# Patient Record
Sex: MALE | Race: WHITE | NOT HISPANIC OR LATINO | Employment: OTHER | URBAN - METROPOLITAN AREA
[De-identification: names, ages, dates, MRNs, and addresses within clinical notes are randomized per-mention and may not be internally consistent; named-entity substitution may affect disease eponyms.]

---

## 2018-05-02 RX ORDER — POLYETHYLENE GLYCOL 3350 17 G/17G
POWDER, FOR SOLUTION ORAL
COMMUNITY
Start: 2014-01-24 | End: 2023-09-10 | Stop reason: ENTERED-IN-ERROR

## 2018-05-02 RX ORDER — GABAPENTIN 300 MG/1
300 CAPSULE ORAL
COMMUNITY
Start: 2015-10-02 | End: 2019-03-12 | Stop reason: SDUPTHER

## 2018-05-02 RX ORDER — POTASSIUM CHLORIDE 20 MEQ/1
20 TABLET, EXTENDED RELEASE ORAL
COMMUNITY
Start: 2017-06-26 | End: 2018-06-18 | Stop reason: SDUPTHER

## 2018-05-02 RX ORDER — LANCETS
EACH MISCELLANEOUS
COMMUNITY
Start: 2013-10-24

## 2018-05-02 RX ORDER — MINERAL OIL
180 ENEMA (ML) RECTAL
COMMUNITY
Start: 2015-01-15 | End: 2019-03-12 | Stop reason: ALTCHOICE

## 2018-05-02 RX ORDER — CLOBETASOL PROPIONATE 0.5 MG/G
0.05 CREAM TOPICAL
COMMUNITY
Start: 2014-06-16 | End: 2019-03-19 | Stop reason: ALTCHOICE

## 2018-05-02 RX ORDER — OXYCODONE HYDROCHLORIDE 5 MG/1
5 CAPSULE ORAL 3 TIMES DAILY
COMMUNITY
Start: 2017-08-01 | End: 2023-09-10 | Stop reason: ENTERED-IN-ERROR

## 2018-05-02 RX ORDER — LIRAGLUTIDE 6 MG/ML
INJECTION SUBCUTANEOUS
COMMUNITY
Start: 2014-10-31 | End: 2023-02-15

## 2018-05-02 RX ORDER — PANTOPRAZOLE SODIUM 40 MG/1
40 TABLET, DELAYED RELEASE ORAL
COMMUNITY
Start: 2017-06-14 | End: 2018-05-30 | Stop reason: SDUPTHER

## 2018-05-02 RX ORDER — CELECOXIB 200 MG/1
200 CAPSULE ORAL
COMMUNITY
Start: 2014-02-19 | End: 2019-03-19 | Stop reason: ALTCHOICE

## 2018-05-02 RX ORDER — DOXYCYCLINE 100 MG/1
100 CAPSULE ORAL
COMMUNITY
End: 2018-08-20 | Stop reason: ALTCHOICE

## 2018-05-02 RX ORDER — GLIMEPIRIDE 4 MG/1
4 TABLET ORAL
COMMUNITY
Start: 2013-12-31 | End: 2018-06-18 | Stop reason: SDUPTHER

## 2018-05-02 RX ORDER — DICLOFENAC SODIUM 10 MG/G
1 GEL TOPICAL
COMMUNITY
Start: 2014-02-19 | End: 2023-09-10 | Stop reason: ENTERED-IN-ERROR

## 2018-05-02 RX ORDER — LISINOPRIL 2.5 MG/1
2.5 TABLET ORAL
COMMUNITY
Start: 2017-08-01 | End: 2018-05-02 | Stop reason: SDUPTHER

## 2018-05-02 RX ORDER — HYDROMORPHONE HYDROCHLORIDE 12 MG/1
12 TABLET, EXTENDED RELEASE ORAL
COMMUNITY
Start: 2014-02-19 | End: 2022-01-31 | Stop reason: ALTCHOICE

## 2018-05-02 RX ORDER — METFORMIN HYDROCHLORIDE 850 MG/1
850 TABLET ORAL
COMMUNITY
Start: 2016-11-28 | End: 2019-07-09 | Stop reason: SDUPTHER

## 2018-05-02 RX ORDER — LISINOPRIL 2.5 MG/1
2.5 TABLET ORAL DAILY
Qty: 90 TABLET | Refills: 3 | Status: SHIPPED | OUTPATIENT
Start: 2018-05-02 | End: 2018-06-18 | Stop reason: SDUPTHER

## 2018-05-02 RX ORDER — ATORVASTATIN CALCIUM 20 MG/1
20 TABLET, FILM COATED ORAL
COMMUNITY
Start: 2017-06-02 | End: 2018-05-22 | Stop reason: SDUPTHER

## 2018-05-02 RX ORDER — VIT C/E/ZN/COPPR/LUTEIN/ZEAXAN 250MG-90MG
CAPSULE ORAL
COMMUNITY
Start: 2016-05-10 | End: 2023-09-10 | Stop reason: ENTERED-IN-ERROR

## 2018-05-02 RX ORDER — BUMETANIDE 1 MG/1
1 TABLET ORAL
COMMUNITY
Start: 2015-01-15 | End: 2018-08-20 | Stop reason: SDUPTHER

## 2018-05-21 ENCOUNTER — TELEPHONE (OUTPATIENT)
Dept: FAMILY MEDICINE | Facility: CLINIC | Age: 68
End: 2018-05-21

## 2018-05-21 DIAGNOSIS — M25.561 ACUTE PAIN OF RIGHT KNEE: Primary | ICD-10-CM

## 2018-05-22 RX ORDER — ATORVASTATIN CALCIUM 20 MG/1
20 TABLET, FILM COATED ORAL DAILY
Qty: 90 TABLET | Refills: 2 | Status: SHIPPED | OUTPATIENT
Start: 2018-05-22 | End: 2018-08-20 | Stop reason: SDUPTHER

## 2018-05-23 ENCOUNTER — HOSPITAL ENCOUNTER (OUTPATIENT)
Dept: RADIOLOGY | Age: 68
Discharge: HOME | End: 2018-05-23
Attending: FAMILY MEDICINE
Payer: MEDICARE

## 2018-05-23 DIAGNOSIS — M25.561 ACUTE PAIN OF RIGHT KNEE: ICD-10-CM

## 2018-05-23 PROCEDURE — 73564 X-RAY EXAM KNEE 4 OR MORE: CPT | Mod: RT

## 2018-05-23 NOTE — PROGRESS NOTES
Spoke with patient's wife and x-ray shows moderate/severe arthritis.  Synvisc would be a good option.  Refer to Dr. Zimmerman.

## 2018-05-30 RX ORDER — PANTOPRAZOLE SODIUM 40 MG/1
40 TABLET, DELAYED RELEASE ORAL DAILY
Qty: 90 TABLET | Refills: 3 | Status: SHIPPED | OUTPATIENT
Start: 2018-05-30 | End: 2018-06-18 | Stop reason: SDUPTHER

## 2018-06-18 RX ORDER — GLIMEPIRIDE 4 MG/1
TABLET ORAL
Qty: 135 TABLET | Refills: 3 | Status: SHIPPED | OUTPATIENT
Start: 2018-06-18 | End: 2019-07-09 | Stop reason: SDUPTHER

## 2018-06-18 RX ORDER — LISINOPRIL 2.5 MG/1
2.5 TABLET ORAL DAILY
Qty: 90 TABLET | Refills: 3 | Status: SHIPPED | OUTPATIENT
Start: 2018-06-18 | End: 2018-08-20 | Stop reason: SDUPTHER

## 2018-06-18 RX ORDER — POTASSIUM CHLORIDE 20 MEQ/1
20 TABLET, EXTENDED RELEASE ORAL 2 TIMES DAILY
Qty: 180 TABLET | Refills: 1 | Status: SHIPPED | OUTPATIENT
Start: 2018-06-18 | End: 2018-06-25 | Stop reason: SDUPTHER

## 2018-06-18 RX ORDER — PANTOPRAZOLE SODIUM 40 MG/1
40 TABLET, DELAYED RELEASE ORAL DAILY
Qty: 90 TABLET | Refills: 3 | Status: SHIPPED | OUTPATIENT
Start: 2018-06-18 | End: 2018-08-20 | Stop reason: SDUPTHER

## 2018-06-25 RX ORDER — POTASSIUM CHLORIDE 20 MEQ/1
20 TABLET, EXTENDED RELEASE ORAL 2 TIMES DAILY
Qty: 180 TABLET | Refills: 1 | Status: SHIPPED | OUTPATIENT
Start: 2018-06-25 | End: 2019-07-09 | Stop reason: SDUPTHER

## 2018-07-11 LAB
ALBUMIN SERPL-MCNC: 3.6 G/DL (ref 3.6–5.1)
ALBUMIN/GLOB SERPL: 1 (CALC) (ref 1–2.5)
ALP SERPL-CCNC: 85 U/L (ref 40–115)
ALT SERPL-CCNC: 18 U/L (ref 9–46)
AST SERPL-CCNC: 29 U/L (ref 10–35)
BILIRUB SERPL-MCNC: 0.8 MG/DL (ref 0.2–1.2)
BUN SERPL-MCNC: 10 MG/DL (ref 7–25)
BUN/CREAT SERPL: ABNORMAL (CALC) (ref 6–22)
CALCIUM SERPL-MCNC: 9 MG/DL (ref 8.6–10.3)
CHLORIDE SERPL-SCNC: 97 MMOL/L (ref 98–110)
CO2 SERPL-SCNC: 27 MMOL/L (ref 20–31)
CREAT SERPL-MCNC: 1 MG/DL (ref 0.7–1.18)
GFR SERPL CREATININE-BSD FRML MDRD: 76 ML/MIN/1.73M2
GLOBULIN SER CALC-MCNC: 3.7 G/DL (CALC) (ref 1.9–3.7)
GLUCOSE SERPL-MCNC: 125 MG/DL (ref 65–99)
HBA1C MFR BLD: 6.8 % OF TOTAL HGB
POTASSIUM SERPL-SCNC: 4.7 MMOL/L (ref 3.5–5.3)
PROT SERPL-MCNC: 7.3 G/DL (ref 6.1–8.1)
SODIUM SERPL-SCNC: 138 MMOL/L (ref 135–146)
TSH SERPL-ACNC: 2.49 MIU/L (ref 0.4–4.5)

## 2018-08-20 ENCOUNTER — OFFICE VISIT (OUTPATIENT)
Dept: FAMILY MEDICINE | Facility: CLINIC | Age: 68
End: 2018-08-20
Payer: MEDICARE

## 2018-08-20 VITALS
HEIGHT: 72 IN | WEIGHT: 315 LBS | RESPIRATION RATE: 15 BRPM | TEMPERATURE: 98.6 F | SYSTOLIC BLOOD PRESSURE: 120 MMHG | HEART RATE: 72 BPM | DIASTOLIC BLOOD PRESSURE: 60 MMHG | BODY MASS INDEX: 42.66 KG/M2

## 2018-08-20 DIAGNOSIS — R53.83 FATIGUE, UNSPECIFIED TYPE: ICD-10-CM

## 2018-08-20 DIAGNOSIS — G62.9 NEUROPATHY: ICD-10-CM

## 2018-08-20 DIAGNOSIS — L03.115 CELLULITIS OF RIGHT LOWER EXTREMITY: Primary | ICD-10-CM

## 2018-08-20 DIAGNOSIS — E11.65 TYPE 2 DIABETES MELLITUS WITH HYPERGLYCEMIA, WITHOUT LONG-TERM CURRENT USE OF INSULIN (CMS/HCC): ICD-10-CM

## 2018-08-20 PROBLEM — E11.319 DIABETIC RETINOPATHY (CMS/HCC): Status: ACTIVE | Noted: 2017-08-23

## 2018-08-20 PROBLEM — H26.9 CATARACTS, BILATERAL: Status: ACTIVE | Noted: 2017-08-23

## 2018-08-20 PROCEDURE — 99214 OFFICE O/P EST MOD 30 MIN: CPT | Performed by: FAMILY MEDICINE

## 2018-08-20 RX ORDER — BUMETANIDE 1 MG/1
1 TABLET ORAL 3 TIMES DAILY
Qty: 270 TABLET | Refills: 3 | Status: SHIPPED | OUTPATIENT
Start: 2018-08-20 | End: 2019-07-09 | Stop reason: SDUPTHER

## 2018-08-20 RX ORDER — METOPROLOL SUCCINATE 50 MG/1
25 TABLET, EXTENDED RELEASE ORAL NIGHTLY
COMMUNITY
End: 2019-03-19 | Stop reason: SINTOL

## 2018-08-20 RX ORDER — AMOXICILLIN AND CLAVULANATE POTASSIUM 875; 125 MG/1; MG/1
1 TABLET, FILM COATED ORAL 2 TIMES DAILY
Qty: 28 TABLET | Refills: 0 | Status: SHIPPED | OUTPATIENT
Start: 2018-08-20 | End: 2019-03-19 | Stop reason: ALTCHOICE

## 2018-08-20 RX ORDER — LISINOPRIL 2.5 MG/1
2.5 TABLET ORAL DAILY
Qty: 90 TABLET | Refills: 3 | Status: SHIPPED | OUTPATIENT
Start: 2018-08-20 | End: 2019-07-09 | Stop reason: SDUPTHER

## 2018-08-20 RX ORDER — PANTOPRAZOLE SODIUM 40 MG/1
40 TABLET, DELAYED RELEASE ORAL DAILY
Qty: 90 TABLET | Refills: 3 | Status: SHIPPED | OUTPATIENT
Start: 2018-08-20 | End: 2019-07-09 | Stop reason: SDUPTHER

## 2018-08-20 RX ORDER — ATORVASTATIN CALCIUM 20 MG/1
20 TABLET, FILM COATED ORAL DAILY
Qty: 90 TABLET | Refills: 3 | Status: SHIPPED | OUTPATIENT
Start: 2018-08-20 | End: 2019-07-09 | Stop reason: SDUPTHER

## 2018-08-20 NOTE — PROGRESS NOTES
Subjective      Patient ID: Mitchell Sauceda is a 68 y.o. male.    Patient presents for follow-up to his chronic medical conditions, diabetes and hypertension.  He has been having ongoing issues with his right lower extremity.  It is red, swollen and warm to touch.  He has been having some drainage from his right lower extremity.  He was recently on an antibiotic (Keflex) for pressure sores on his buttocks.  He is no fever or chills.  No chest pain or shortness of breath.  He does have fatigue.        The following have been reviewed and updated as appropriate in this visit:  Tobacco  Meds  Problems  Med Hx  Surg Hx  Fam Hx  Soc Hx      Review of Systems   Constitutional: Positive for fatigue. Negative for chills and fever.   Respiratory: Negative for cough and shortness of breath.    Cardiovascular: Positive for leg swelling.   Musculoskeletal: Positive for arthralgias.   Neurological: Positive for numbness. Negative for dizziness, light-headedness and headaches.   Hematological: Does not bruise/bleed easily.       Objective     Vitals:    08/20/18 1057 08/20/18 1112   BP: (!) 130/92 120/60   BP Location:  Left upper arm   Patient Position:  Sitting   Pulse: 72    Resp: 15    Temp: 37 °C (98.6 °F)    Weight: (!) 154 kg (339 lb)    Height: 1.829 m (6')      Body mass index is 45.98 kg/m².    Physical Exam   Constitutional: He appears well-developed and well-nourished.   Cardiovascular: Normal rate, regular rhythm and normal heart sounds.    No murmur heard.  Pulmonary/Chest: Effort normal and breath sounds normal. No respiratory distress. He has no wheezes.   Abdominal: Soft. Bowel sounds are normal. He exhibits no distension. There is no tenderness.   Skin: There is erythema.        Clear weeping from right lower leg  Warm to touch       Assessment/Plan   Problem List Items Addressed This Visit     Neuropathy (CMS/HCC)     Persistent  Fair control  Continue gabapentin         Type 2 diabetes mellitus with  hyperglycemia (CMS/HCC) (HCC)     Controlled  HgbA1c 6.8  Continue current medication           Relevant Medications    insulin lispro protamin/lispro (HUMALOG MIX 75-25,U-100,INSULN SUBQ)    Cellulitis of right lower extremity - Primary     Prescribed Augmentin 875mg 2x day for 14 days  Follow up with wound care         Fatigue     Likely from polypharmacy  Suggest he take the metoprolol at bedtime

## 2018-08-21 PROBLEM — L03.115 CELLULITIS OF RIGHT LOWER EXTREMITY: Status: ACTIVE | Noted: 2018-08-21

## 2018-08-21 ASSESSMENT — ENCOUNTER SYMPTOMS
FEVER: 0
CHILLS: 0
DIZZINESS: 0
HEADACHES: 0
SHORTNESS OF BREATH: 0
ARTHRALGIAS: 1
FATIGUE: 1
BRUISES/BLEEDS EASILY: 0
LIGHT-HEADEDNESS: 0
COUGH: 0

## 2018-08-22 PROBLEM — R53.83 FATIGUE: Status: ACTIVE | Noted: 2018-08-22

## 2018-08-22 PROBLEM — R53.82 CHRONIC FATIGUE: Status: ACTIVE | Noted: 2018-08-22

## 2018-08-22 ASSESSMENT — ENCOUNTER SYMPTOMS: NUMBNESS: 1

## 2018-09-06 ENCOUNTER — TELEPHONE (OUTPATIENT)
Dept: FAMILY MEDICINE | Facility: CLINIC | Age: 68
End: 2018-09-06

## 2018-09-06 NOTE — TELEPHONE ENCOUNTER
Ins faxed form to be faxed back, requesting Hemo a1c and OV notes signed by PT. This is for medicare. On PT's ledge for signatures

## 2018-09-24 ENCOUNTER — CLINICAL SUPPORT (OUTPATIENT)
Dept: FAMILY MEDICINE | Facility: CLINIC | Age: 68
End: 2018-09-24
Payer: MEDICARE

## 2018-09-24 DIAGNOSIS — Z23 NEED FOR INFLUENZA VACCINATION: Primary | ICD-10-CM

## 2018-09-24 PROCEDURE — G0008 ADMIN INFLUENZA VIRUS VAC: HCPCS | Performed by: FAMILY MEDICINE

## 2018-09-24 PROCEDURE — 90653 IIV ADJUVANT VACCINE IM: CPT | Performed by: FAMILY MEDICINE

## 2018-10-02 ENCOUNTER — DOCUMENTATION (OUTPATIENT)
Dept: FAMILY MEDICINE | Facility: CLINIC | Age: 68
End: 2018-10-02

## 2018-10-02 PROBLEM — E13.40: Status: ACTIVE | Noted: 2018-10-02

## 2018-10-02 NOTE — PROGRESS NOTES
Coding Clarification ([facility]) - [facility]  Note       DATE: 10/2/2018    RE: Mitchell DAILEY Sohail  : 1950      Under the direction of Aziza Randolph DO, the following adjustments were made to this patients Problem List:        New Code: E13.40           Code Description: Other specified diabetes mellitus with diabetic neuropathy, unspecified   Clarification Type EMR System Encounter Type Date of Service Provider   New Code [employer] Office Visit 2018 Aziza Randolph   Rationale: Code E13.40 Other specified diabetes mellitus with diabetic neuropathy, unspecified is documented in the office visit in the Assessment/Plan section of the encounter.

## 2018-10-12 LAB
AMB MLHC EXTERNAL DIABETIC EYE EXAMS: POSITIVE
AMB MLHC EXTERNAL DIABETIC EYE EXAMS: POSITIVE

## 2018-11-15 LAB
ALBUMIN SERPL-MCNC: 3.4 G/DL (ref 3.6–5.1)
ALBUMIN/CREAT UR: 19 MCG/MG CREAT
ALBUMIN/GLOB SERPL: 0.9 (CALC) (ref 1–2.5)
ALP SERPL-CCNC: 68 U/L (ref 40–115)
ALT SERPL-CCNC: 14 U/L (ref 9–46)
AST SERPL-CCNC: 25 U/L (ref 10–35)
BILIRUB SERPL-MCNC: 0.9 MG/DL (ref 0.2–1.2)
BUN SERPL-MCNC: 13 MG/DL (ref 7–25)
BUN/CREAT SERPL: ABNORMAL (CALC) (ref 6–22)
CALCIUM SERPL-MCNC: 8.9 MG/DL (ref 8.6–10.3)
CHLORIDE SERPL-SCNC: 98 MMOL/L (ref 98–110)
CO2 SERPL-SCNC: 32 MMOL/L (ref 20–32)
CREAT SERPL-MCNC: 0.99 MG/DL (ref 0.7–1.18)
CREAT UR-MCNC: 137 MG/DL (ref 20–320)
ERYTHROCYTE [DISTWIDTH] IN BLOOD BY AUTOMATED COUNT: 14.4 % (ref 11–15)
GFR SERPL CREATININE-BSD FRML MDRD: 77 ML/MIN/1.73M2
GLOBULIN SER CALC-MCNC: 4 G/DL (CALC) (ref 1.9–3.7)
GLUCOSE SERPL-MCNC: 61 MG/DL (ref 65–99)
HBA1C MFR BLD: 6.5 % OF TOTAL HGB
HCT VFR BLD AUTO: 36.4 % (ref 38.5–50)
HGB BLD-MCNC: 12 G/DL (ref 13.2–17.1)
MCH RBC QN AUTO: 28.6 PG (ref 27–33)
MCHC RBC AUTO-ENTMCNC: 33 G/DL (ref 32–36)
MCV RBC AUTO: 86.9 FL (ref 80–100)
MICROALBUMIN UR-MCNC: 2.6 MG/DL
PLATELET # BLD AUTO: 181 THOUSAND/UL (ref 140–400)
PMV BLD REES-ECKER: 10.8 FL (ref 7.5–12.5)
POTASSIUM SERPL-SCNC: 4.3 MMOL/L (ref 3.5–5.3)
PROT SERPL-MCNC: 7.4 G/DL (ref 6.1–8.1)
RBC # BLD AUTO: 4.19 MILLION/UL (ref 4.2–5.8)
SODIUM SERPL-SCNC: 138 MMOL/L (ref 135–146)
WBC # BLD AUTO: 4.3 THOUSAND/UL (ref 3.8–10.8)

## 2019-03-12 ENCOUNTER — PATIENT OUTREACH (OUTPATIENT)
Dept: CASE MANAGEMENT | Facility: CLINIC | Age: 69
End: 2019-03-12

## 2019-03-12 RX ORDER — ASCORBIC ACID 500 MG
1 TABLET ORAL DAILY
COMMUNITY

## 2019-03-12 RX ORDER — LANOLIN ALCOHOL/MO/W.PET/CERES
1000 CREAM (GRAM) TOPICAL DAILY
Status: ON HOLD | COMMUNITY
End: 2023-09-13 | Stop reason: SDUPTHER

## 2019-03-12 RX ORDER — GABAPENTIN 800 MG/1
300 TABLET ORAL
COMMUNITY
End: 2023-02-15

## 2019-03-12 RX ORDER — CETIRIZINE HYDROCHLORIDE 10 MG/1
10 TABLET ORAL DAILY
COMMUNITY
End: 2023-09-10 | Stop reason: ENTERED-IN-ERROR

## 2019-03-12 RX ORDER — FERROUS SULFATE 325(65) MG
1 TABLET, DELAYED RELEASE (ENTERIC COATED) ORAL EVERY OTHER DAY
COMMUNITY
End: 2023-09-10 | Stop reason: ENTERED-IN-ERROR

## 2019-03-12 NOTE — PROGRESS NOTES
NAME: Mitchell Sauceda    MRN: [id number]    YOB: 1950    EVENT DETAILS    Discharging Facility: [facility]  Date of Admission: 03/08/19  Date of Discharge: 03/10/19  Discharge Instructions Reviewed?: Yes         Reason for Admission: syncope/collapse       HPI: Spoke to pt's spouse. Pt presented to hospital after having a fall at home and hitting his head. Work up revealed slow afib in 30s-40s with pauses. CTH neg for bleed. Pt was dc to home.     Pt's spouse reports that they cancelled the appt at [facility] today and rescheduled for April. Pt's spouse called Dr. Nettles yesterday and clarified that pt is to resume Humalog, victoza and glimepiride that was listed as dc on after visit summary. Pt's spouse reports that BS was high this AM but that pt has just resumed meds. Pt is using walker and being more careful in home with ambulation, CC reviewed fall prevention and home safety. Pt's spouse reports that pt had just woken up and was groggy prior to fall. Pt's has difficulty with his knees giving out on him. Pt denies dizziness, CP, palps, f/c or falls. Pt has SOB and wheezing with exertion. Pt has chronic edema of BLE that has decreased and skin is intact. Pt scheduled for TCM appt 3/19.           MEDICATION REVIEW:    Reported by:: Spouse or Significant Other  Prescriptions Filled?: Yes     Was a medication discrepancy indentified?: No     Medication understanding?: Yes     Medication Adherence?: Yes     Any side effects from medication?: No       Medication review Reviewed, see medication history    Additional Comments: DC Metoprolol      FOLLOW-UP TESTS/PROCEDURES:    Dr. Randolph TCM f/u 3/19  Dr. Quintana TBS 1-2 weeks  Dr. Nettles in 2 weeks  Dr. Mandujano as scheduled    Vascular study at [facility] in April  BMP in 5 days, pt will go to [facility]      HOME MANAGEMENT    Living Arrangement: Spouse or Significant Other  Home Monitoring: Blood Sugars (BS AC/HS )    HOME CARE  SERVICES    Receiving Home Care Services: No              DURABLE MEDICAL EQUIPMENT    Durable Medical Equipment: Walker, Tub Seat, Other (glucometer, CPap, grab bars in shower)  Oxygen Use: No            BARRIERS TO CARE    SELF-CARE    Living Arrangement: Spouse or Significant Other       SOCIOECONOMIC    Financial Problems?: No     Transportation Issues?: No            INTERVENTION/CARE COORDINATION    Interventions/ Care Coordination: Assisted patient in the scheduling of an appointment    PLAN OF CARE:    Reviewed signs/symptoms of worsening condition or complication that necessitate a call to the Physician's office.  Educated patient on access to care.  RN phone number given for future care management needs.       Zabrina Ulloa, RN  [phone]

## 2019-03-19 ENCOUNTER — PATIENT OUTREACH (OUTPATIENT)
Dept: CASE MANAGEMENT | Facility: CLINIC | Age: 69
End: 2019-03-19

## 2019-03-19 ENCOUNTER — OFFICE VISIT (OUTPATIENT)
Dept: FAMILY MEDICINE | Facility: CLINIC | Age: 69
End: 2019-03-19
Payer: MEDICARE

## 2019-03-19 VITALS
TEMPERATURE: 98.3 F | SYSTOLIC BLOOD PRESSURE: 128 MMHG | RESPIRATION RATE: 18 BRPM | HEART RATE: 72 BPM | DIASTOLIC BLOOD PRESSURE: 74 MMHG | BODY MASS INDEX: 42.66 KG/M2 | WEIGHT: 315 LBS | HEIGHT: 72 IN

## 2019-03-19 DIAGNOSIS — E11.649 TYPE 2 DIABETES MELLITUS WITH HYPOGLYCEMIA WITHOUT COMA, WITH LONG-TERM CURRENT USE OF INSULIN (CMS/HCC): ICD-10-CM

## 2019-03-19 DIAGNOSIS — R26.2 AMBULATORY DYSFUNCTION: Primary | ICD-10-CM

## 2019-03-19 DIAGNOSIS — I48.19 PERSISTENT ATRIAL FIBRILLATION (CMS/HCC): ICD-10-CM

## 2019-03-19 DIAGNOSIS — Z79.4 TYPE 2 DIABETES MELLITUS WITH HYPOGLYCEMIA WITHOUT COMA, WITH LONG-TERM CURRENT USE OF INSULIN (CMS/HCC): ICD-10-CM

## 2019-03-19 DIAGNOSIS — E66.01 MORBID OBESITY (CMS/HCC): ICD-10-CM

## 2019-03-19 PROBLEM — D64.9 ANEMIA: Status: ACTIVE | Noted: 2017-06-06

## 2019-03-19 PROBLEM — G47.30 SLEEP APNEA: Status: ACTIVE | Noted: 2017-08-07

## 2019-03-19 PROBLEM — G89.29 CHRONIC PAIN: Status: ACTIVE | Noted: 2017-08-08

## 2019-03-19 PROBLEM — I87.331 CHRONIC VENOUS HYPERTENSION WITH ULCER AND INFLAMMATION INVOLVING RIGHT SIDE (CMS/HCC): Status: ACTIVE | Noted: 2018-11-08

## 2019-03-19 PROBLEM — R00.1 BRADYCARDIA: Status: ACTIVE | Noted: 2019-03-08

## 2019-03-19 PROCEDURE — 99495 TRANSJ CARE MGMT MOD F2F 14D: CPT | Performed by: FAMILY MEDICINE

## 2019-03-19 RX ORDER — GABAPENTIN 400 MG/1
800 CAPSULE ORAL 4 TIMES DAILY
COMMUNITY
End: 2019-03-19 | Stop reason: ENTERED-IN-ERROR

## 2019-03-19 ASSESSMENT — ENCOUNTER SYMPTOMS
HEADACHES: 0
BRUISES/BLEEDS EASILY: 0
ABDOMINAL PAIN: 0
SHORTNESS OF BREATH: 0
ARTHRALGIAS: 1
PALPITATIONS: 0
VOMITING: 0
CONSTIPATION: 0
BACK PAIN: 1
DIARRHEA: 0
FATIGUE: 1

## 2019-03-19 NOTE — PROGRESS NOTES
CC received cb/VM from Juan Lake at [facility] that they do not carry motorized WC. Juan suggested to try [facility] as they have motorized WC.     CC also spoke to GINGER Bañuelos who informed that pt can go to [facility] for power WC evaluation and that Sean Andrew from [facility] is on site and will assist with measuring and getting appropriate equipment ordered. Pt would need to schedule appt at [facility] for power WC eval and PCP will need to complete mobility assessment. CC will update PCP.

## 2019-03-19 NOTE — PROGRESS NOTES
Subjective      Patient ID: Mitchell Sauceda is a 69 y.o. male.  1950      Patient fell at home trying to get into the bathroom with his walker.  He hit his head against the door.  No LOC.  He was admitted to [facility] on 3/8/19.  CT head negative. History of diabetes and he has been having low blood sugars in the 50s on occasion.  He does not have symptoms when his has low sugars. He has been prescribed medical marijuana by Dr. Salguero.  He complains of fatigue.  No diarrhea or vomiting. Appetite is decreased, states he is not hungry.  He needs another means of getting around safely.  A cane is not enough support.  He can not maneuver a manual wheelchair because of his size and weight.  He can not use a scooter because he is unable to get in and out of it safely and would not be able to maintain an upright position.  He would be able to use a motorized wheelchair safely as this could support his weight, simple use of controls and no risk for falling.  Patient is unable to ambulate well in his household because of weakness, risk for falls.  He lives on the first floor of his home.  Walker is not able to support his weight and difficulty moving around steadily with walker.  He can not cook or drive, he can not do any chores around the home.  He needs full assistance with bathing and dressing.  His wife if his caregiver.         The following have been reviewed and updated as appropriate in this visit:  Tobacco  Allergies  Meds  Problems  Med Hx  Surg Hx  Soc Hx      Review of Systems   Constitutional: Positive for fatigue.   Respiratory: Negative for shortness of breath.    Cardiovascular: Negative for chest pain and palpitations.   Gastrointestinal: Negative for abdominal pain, constipation, diarrhea and vomiting.   Musculoskeletal: Positive for arthralgias and back pain.   Neurological: Negative for headaches.   Hematological: Does not bruise/bleed easily.       Objective     Vitals:    03/19/19 0850   BP:  128/74   Pulse: 72   Resp: 18   Temp: 36.8 °C (98.3 °F)   Weight: (!) 144 kg (318 lb)   Height: 1.829 m (6')     Body mass index is 43.13 kg/m².    Physical Exam   Constitutional: He is oriented to person, place, and time. He appears well-developed and well-nourished.   Cardiovascular: S1 normal and S2 normal.  An irregularly irregular rhythm present.   Pulmonary/Chest: Effort normal. No respiratory distress. He has no wheezes. He has no rales.   Abdominal: Soft. Bowel sounds are normal. He exhibits no distension. There is no tenderness.   Neurological: He is alert and oriented to person, place, and time.   Skin:        Stasis dermatitis changes right lower leg       Assessment/Plan   Diagnoses and all orders for this visit:    Ambulatory dysfunction (Primary)  Assessment & Plan:  Secondary to severe OA of bilateral knees  Recent fall, hospital records reviewed  He uses a walker but would benefit greatly from motorized wheelchair  Will discuss with team and explore his options      Persistent atrial fibrillation (CMS/HCC) ([facility])  Assessment & Plan:  Rate controlled  Continue xarelto  Follow up with cardiologist      Type 2 diabetes mellitus with hypoglycemia without coma, with long-term current use of insulin (CMS/HCC)  Assessment & Plan:  Controlled HgbA1c 6.2  He will follow up with endocrinologist but would consider d/c insulin and only using oral agents      Morbid obesity (CMS/[facility])  Assessment & Plan:  Ambulatory dysfunction   Cane, walker unable to support his weight   He is an excellent candidate for motorized wheelchair

## 2019-03-19 NOTE — ASSESSMENT & PLAN NOTE
Secondary to severe OA of bilateral knees  Recent fall, hospital records reviewed  He uses a walker but would benefit greatly from motorized wheelchair  Will discuss with team and explore his options

## 2019-03-19 NOTE — PROGRESS NOTES
CC outreach call to Juan Lake at [facility] and LM and req cb, inquiring about motorized WC for pt and what is needed to order. Provided contact info.

## 2019-03-20 ENCOUNTER — TELEPHONE (OUTPATIENT)
Dept: FAMILY MEDICINE | Facility: CLINIC | Age: 69
End: 2019-03-20

## 2019-03-20 DIAGNOSIS — R26.2 AMBULATORY DYSFUNCTION: Primary | ICD-10-CM

## 2019-03-20 NOTE — TELEPHONE ENCOUNTER
VM received from pt's wife. She called over to [facility] and patient can not be scheduled until the P.T. Order is placed. Let Sofi know when done.

## 2019-03-20 NOTE — PROGRESS NOTES
Spoke with patient's wife and gave her the information for [facility] for motorized wheelchair evaluation and contact information, Sean Andrew.

## 2019-03-26 ENCOUNTER — TELEPHONE (OUTPATIENT)
Dept: FAMILY MEDICINE | Facility: CLINIC | Age: 69
End: 2019-03-26

## 2019-03-26 NOTE — TELEPHONE ENCOUNTER
"VM from wife. Patient fell again last night. His BS was 53. She has a call out to Dr. Nettles's office and is waiting for a call back. Nurse mentioned that his medication may be \"incorrect\".   In the meantime, Sofi thinks it would be better for someone to come out to the house for physical therapy. Hoping to help patient gain more strength in his legs.  "

## 2019-03-27 NOTE — TELEPHONE ENCOUNTER
"PC from [facility] apologizing for being \"wild\" yesterday. She got wheelchair appointment moved up to 4/6/19. Mitchell is wearing the knee brace, which is very helpful. She will wait and see what [facility] says about whether he should have home therapy or not. She thanks you for all your help and being so great.  "

## 2019-03-28 ENCOUNTER — EXTERNAL RECORD (OUTPATIENT)
Dept: HEALTH INFORMATION MANAGEMENT | Facility: OTHER | Age: 69
End: 2019-03-28

## 2019-04-08 ENCOUNTER — DOCUMENTATION (OUTPATIENT)
Dept: SOCIAL WORK | Facility: REHABILITATION | Age: 69
End: 2019-04-08

## 2019-04-08 NOTE — PROGRESS NOTES
4/8/19: CM called the patient and left a short message. FLOYD would like to provide the patient with information regarding his AT appointment tomorrow. COCO Fan.

## 2019-04-09 ENCOUNTER — HOSPITAL ENCOUNTER (OUTPATIENT)
Dept: PHYSICAL THERAPY | Facility: REHABILITATION | Age: 69
Setting detail: THERAPIES SERIES
Discharge: HOME | End: 2019-04-09
Attending: FAMILY MEDICINE
Payer: MEDICARE

## 2019-04-09 VITALS
WEIGHT: 315 LBS | BODY MASS INDEX: 41.75 KG/M2 | HEART RATE: 61 BPM | SYSTOLIC BLOOD PRESSURE: 104 MMHG | DIASTOLIC BLOOD PRESSURE: 65 MMHG | OXYGEN SATURATION: 95 % | HEIGHT: 73 IN

## 2019-04-09 DIAGNOSIS — R26.2 AMBULATORY DYSFUNCTION: Primary | ICD-10-CM

## 2019-04-09 PROCEDURE — 97163 PT EVAL HIGH COMPLEX 45 MIN: CPT

## 2019-04-09 PROCEDURE — 97542 WHEELCHAIR MNGMENT TRAINING: CPT | Mod: GP

## 2019-04-09 NOTE — LETTER
[address] [address]  [phone]  [employer] Fax: [phone]    PHYSICAL THERAPY PLAN OF CARE    Patient Name: Mitchell Sauceda    Certification Dates:  From 19  To: 10/06/19  Frequency:   Duration:    Other: 1-3 additional visits after initial eval on 19 for equipment trial/selection as well as final fitting upon delivery    Provider: Marilyn Gamboa PT   Referring Provider: Aziza Randolph DO  PCP: Aziza Randolph DO      Payor: Payor: MEDICARE / Plan: MEDICARE RAILROAD / Product Type: Medicare /   Medical Diagnosis: Ambulatory dysfunction [R26.2]         By signing this plan of care, I certify this plan of care as correct and necessary for the patient.        Physician Signature: _________________________________________ Date: _________________    Thank you for this referral. Please contact our department with any questions.      Marilyn Gamboa, PT      [facility]   PT/OT EVALUATION FOR MOBILITY-ASSISTIVE DEVICE    Medicare Provider No. : [id number]                               Patient: Mitchell Sauceda   MRN: [id number]  : 1950   Referring Physician: Aziza Randolph DO  Insurance Company: MEDICARE  Policy #:[id number]    Start of Care/Evaluation Date: 19   Certification Dates: 19 through 10/06/19    Diagnosis:   1. Ambulatory dysfunction        Treatment Diagnosis: Gait Dysfunction    GOALS  Goals Addressed     • Other Goal                  Long Term Goals Time Frame Result Comment/Progress   Evaluate mobility and need for wheelchair 24 weeks          Access to Mobility-related ADL’s within the home 24 weeks                      _X__The patient and caregiver were involved in the goal-setting and are in agreement with the proposed plan.     ASSESSMENT:  Mitchell Sauceda is a 68 y.o. male with a diagnosis of ambulatory dysfunction who does not walk and requires a power wheelchair for his mobility and access to  mobility related ADLs within his home.     PLAN OF CARE  Second visit with with DME representative for equipment trials and to develop wheelchair recommendations  Pursue documentation and funding for new equipment  Follow up at [facility] once new equipment has arrived    Frequency and duration of treatment: 04/09/19,  [id number]

## 2019-04-09 NOTE — PROGRESS NOTES
[facility]   PT/OT EVALUATION FOR MOBILITY-ASSISTIVE DEVICE    Medicare Provider No. : [id number]                               Patient: Mitchell Sauceda   MRN: [id number]  : 1950   Referring Physician: Aziza Randolph DO  Insurance Company: MEDICARE  Policy #:[id number]    Start of Care/Evaluation Date: 19   Certification Dates: 19 through 10/06/19    Diagnosis:   1. Ambulatory dysfunction        Treatment Diagnosis: Gait Dysfunction    GOALS  Goals Addressed     • Other Goal                  Long Term Goals Time Frame Result Comment/Progress   Evaluate mobility and need for wheelchair 24 weeks          Access to Mobility-related ADL’s within the home 24 weeks                      _X__The patient and caregiver were involved in the goal-setting and are in agreement with the proposed plan.     ASSESSMENT:  Mitchell Sauceda is a 68 y.o. male with a diagnosis of ambulatory dysfunction who does not walk and requires a power wheelchair for his mobility and access to mobility related ADLs within his home.     PLAN OF CARE  Second visit with with DME representative for equipment trials and to develop wheelchair recommendations  Pursue documentation and funding for new equipment  Follow up at [facility] once new equipment has arrived    Frequency and duration of treatment: 19,  [id number]

## 2019-04-30 ENCOUNTER — HOSPITAL ENCOUNTER (OUTPATIENT)
Dept: PHYSICAL THERAPY | Facility: REHABILITATION | Age: 69
Setting detail: THERAPIES SERIES
Discharge: HOME | End: 2019-04-30
Attending: FAMILY MEDICINE
Payer: MEDICARE

## 2019-04-30 DIAGNOSIS — R26.2 AMBULATORY DYSFUNCTION: Primary | ICD-10-CM

## 2019-04-30 PROCEDURE — 97755 ASSISTIVE TECHNOLOGY ASSESS: CPT | Mod: GP

## 2019-04-30 NOTE — OP PT TREATMENT LOG
Shree Rutledge, NESTOR present for session and assisted with equipment trial/selection. Mitchell transferred into a Quantum Q6 Edge GenieBeltD power chair with Clinton Ion cushion and equipped with power tilt, recline, and elevating legrests. Measurements were taken for custom fit of own chair, and test drive on indoor and outdoor terrain for household and community distances was completed with modified independence for the former and supervision for the latter. All parties in agreement with final equipment selection, and formal LMN to follow as well as home visit with vendor.

## 2019-04-30 NOTE — PROGRESS NOTES
PT DAILY NOTE FOR OUTPATIENT THERAPY    Patient: Mitchell Sauceda   MRN: [id number]  : 1950 69 y.o.  Referring Physician: Aziza Randolph DO  Date of Visit: 2019      Certification Dates: 19 through 10/06/19    Diagnosis:   1. Ambulatory dysfunction        Chief Complaints:   Chief Complaint   Patient presents with   • Difficulty Walking   • Balance Deficits   • Pain   • Dec Strength   • Dec ROM       Precautions: fall      TODAY'S VISIT          General Information - 19 1037        Session Details    Document Type daily treatment    Mode of Treatment individual therapy;physical therapy    Patient/Family Observations Pt, wife, and vendor ([employer]) from [facility] present    OP Specialty Wheelchair       Time Calculation    Start Time 1035    Stop Time 1200    Time Calculation (min) 85 min       General Information    Existing Precautions/Restrictions fall              Pain/Vitals - 19 1041        Pain/Comfort/Sleep    Presence of Pain complains of pain/discomfort    Preferred Pain Scale Chronic Pain    Pain Body Location knee    Pain Level at Best 0    Pain With Activity 10    Pain Level at Worst 10              Daily Falls Screen - 19 1041        Daily Falls Assessment    Patient reported fall since last visit No              Daily Treatment Assessment and Plan - 19 1618        Daily Treatment Assessment and Plan    Progress toward goals Progressing    Daily Outcome Summary Equipment selection was successful; anticipate pt to be modified independent with mobility in his home with power wheelchair once delivery occurs.    Plan and Recommendations F/u in clinic for delivery and fitting once chair is ready.           OBJECTIVE DATA TAKEN TODAY:    None taken    Today's Treatment:      NESTOR Light present for session and assisted with equipment trial/selection. Micthell transferred into a Quantum Q6 Edge MWD power chair with Clinton Ion cushion and  equipped with power tilt, recline, and elevating legrests. Measurements were taken for custom fit of own chair, and test drive on indoor and outdoor terrain for household and community distances was completed with modified independence for the former and supervision for the latter. All parties in agreement with final equipment selection, and formal LMN to follow as well as home visit with vendor.

## 2019-05-01 ENCOUNTER — TELEPHONE (OUTPATIENT)
Dept: FAMILY MEDICINE | Facility: CLINIC | Age: 69
End: 2019-05-01

## 2019-05-01 NOTE — TELEPHONE ENCOUNTER
Pt's wife lm  - thank you  - moving right along with getting the electric wheelchair. Another form needs your signature. Wife will bring it to her appt.

## 2019-05-15 PROBLEM — M15.0 PRIMARY OSTEOARTHRITIS INVOLVING MULTIPLE JOINTS: Status: ACTIVE | Noted: 2019-05-15

## 2019-05-15 NOTE — ASSESSMENT & PLAN NOTE
Significant DJD of both knees, lumbar spine  Unable to ambulate with cane, walker or manual wheelchair due to size and safety issues (fall risk)

## 2019-05-15 NOTE — ASSESSMENT & PLAN NOTE
Ambulatory dysfunction   Cane, walker unable to support his weight   He is an excellent candidate for motorized wheelchair

## 2019-06-14 LAB
ALBUMIN SERPL-MCNC: 3.7 G/DL (ref 3.6–5.1)
ALBUMIN/CREAT UR: 7 MCG/MG CREAT
ALBUMIN/GLOB SERPL: 0.9 (CALC) (ref 1–2.5)
ALP SERPL-CCNC: 63 U/L (ref 40–115)
ALT SERPL-CCNC: 16 U/L (ref 9–46)
AST SERPL-CCNC: 24 U/L (ref 10–35)
BILIRUB SERPL-MCNC: 0.8 MG/DL (ref 0.2–1.2)
BUN SERPL-MCNC: 20 MG/DL (ref 7–25)
BUN/CREAT SERPL: 15 (CALC) (ref 6–22)
CALCIUM SERPL-MCNC: 9.1 MG/DL (ref 8.6–10.3)
CHLORIDE SERPL-SCNC: 99 MMOL/L (ref 98–110)
CHOLEST SERPL-MCNC: 137 MG/DL
CHOLEST/HDLC SERPL: 3.3 (CALC)
CO2 SERPL-SCNC: 31 MMOL/L (ref 20–32)
CREAT SERPL-MCNC: 1.36 MG/DL (ref 0.7–1.18)
CREAT UR-MCNC: 149 MG/DL (ref 20–320)
GLOBULIN SER CALC-MCNC: 3.9 G/DL (CALC) (ref 1.9–3.7)
GLUCOSE SERPL-MCNC: 132 MG/DL (ref 65–99)
HBA1C MFR BLD: 6.6 % OF TOTAL HGB
HDLC SERPL-MCNC: 41 MG/DL
LDLC SERPL CALC-MCNC: 78 MG/DL (CALC)
MICROALBUMIN UR-MCNC: 1.1 MG/DL
NONHDLC SERPL-MCNC: 96 MG/DL (CALC)
POTASSIUM SERPL-SCNC: 4.2 MMOL/L (ref 3.5–5.3)
PROT SERPL-MCNC: 7.6 G/DL (ref 6.1–8.1)
QUEST EGFR NON-AFR. AMERICAN: 52 ML/MIN/1.73M2
SODIUM SERPL-SCNC: 138 MMOL/L (ref 135–146)
TRIGL SERPL-MCNC: 99 MG/DL

## 2019-07-05 ENCOUNTER — HOSPITAL ENCOUNTER (OUTPATIENT)
Dept: PHYSICAL THERAPY | Facility: REHABILITATION | Age: 69
Setting detail: THERAPIES SERIES
Discharge: HOME | End: 2019-07-05
Attending: FAMILY MEDICINE
Payer: MEDICARE

## 2019-07-05 DIAGNOSIS — R26.2 AMBULATORY DYSFUNCTION: Primary | ICD-10-CM

## 2019-07-05 PROCEDURE — 97755 ASSISTIVE TECHNOLOGY ASSESS: CPT | Mod: GP

## 2019-07-05 PROCEDURE — 97542 WHEELCHAIR MNGMENT TRAINING: CPT | Mod: GP,59

## 2019-07-05 NOTE — OP PT TREATMENT LOG
"  Long Term Goals Time Frame Result Comment/Progress   Independent mobility in new power wheelchair  12 weeks MET      Good postural support in new seating system 12 weeks MET    Good skin pressure management in new seating system 12 weeks MET        Serial number of new wheelchair: [id number]        Intervention and results:  Mitchell transferred into his new Quantum Q6 Edge MWD C and had his footplates widened in tandem with his seat depth lengthened to optimize his thigh position, but he still favors a more abducted posture (more neutral hips cause discomfort at knees). Joystick position was optimized to avoid pressure/contact on to his right thigh, and headrest position was adjusted for optimal fit. Armrests were in appropriate position to start, and programming was performed to reduce abrupt nature of stopping when pt drove (as well as to correct the tilt actuator releasing abruptly on descent). Mitchell reported good comfort in the chair, and demonstrated independence driving and maneuvering chair on indoor and outdoor surfaces for household and community distances. Mitchell preferred to utilize the power seating functions through the joystick due to challenges sustaining pressure on the buttons on the ICS switchbox. Mitchell was able to independently operate all power seating functions (recline, tilt, elevating legrests) to support his multiple needs (edema, skin, fatigue, pain). Mitchell and his wife are to pursue a wheelchair van as well as a portable ramp to get up the 4\" step into the house (recently developed after they closed their porch). Shree from [facility] will deliver chair to home and assist Mitchell with practicing using his chair up/down his driveway to enable him to take his dog outside and get the mail. All parties verbalized satisfaction with chair and its functions.     Education provided in these areas:  Care of new wheelchair   Skin pressure management  How to get service for mobility " device  Use of transit tie-downs   Charging batteries            Plan:  Delivery of chair to take place next week; pt and wife did not have a vehicle to transport PWC home. No further needs through [facility] seating clinic at this time.

## 2019-07-05 NOTE — PROGRESS NOTES
PT DAILY NOTE FOR OUTPATIENT THERAPY    Patient: Mitchell Sauceda   MRN: [id number]  : 1950 69 y.o.  Referring Physician: Aziza Randolph DO  Date of Visit: 2019      Certification Dates: 19 through 10/06/19    Diagnosis:   1. Ambulatory dysfunction        Chief Complaints:   Chief Complaint   Patient presents with   • Difficulty Walking   • Decreased Mobility       Precautions:        TODAY'S VISIT          General Information - 19 1046        Session Details    Document Type discharge evaluation    Mode of Treatment individual therapy;physical therapy    OP Specialty Wheelchair       Time Calculation    Start Time 1035    Stop Time 1200    Time Calculation (min) 85 min              Pain/Vitals - 19 1046        Pain/Comfort/Sleep    Presence of Pain denies       Pain Intervention    Intervention  using medical marijuana    Post Intervention Comments no pain in chair              Daily Falls Screen - 19 1047        Daily Falls Assessment    Patient reported fall since last visit No            OBJECTIVE DATA TAKEN TODAY:    None taken    Today's Treatment:        Long Term Goals Time Frame Result Comment/Progress   Independent mobility in new power wheelchair  12 weeks MET      Good postural support in new seating system 12 weeks MET    Good skin pressure management in new seating system 12 weeks MET        Serial number of new wheelchair: [id number]        Intervention and results:  Mitchell transferred into his new Quantum Q6 Edge D PWC and had his footplates widened in tandem with his seat depth lengthened to optimize his thigh position, but he still favors a more abducted posture (more neutral hips cause discomfort at knees). Joystick position was optimized to avoid pressure/contact on to his right thigh, and headrest position was adjusted for optimal fit. Armrests were in appropriate position to start, and programming was performed to reduce abrupt nature of stopping  "when pt drove (as well as to correct the tilt actuator releasing abruptly on descent). Mitchell reported good comfort in the chair, and demonstrated independence driving and maneuvering chair on indoor and outdoor surfaces for household and community distances. Mitchell preferred to utilize the power seating functions through the joystick due to challenges sustaining pressure on the buttons on the ICS switchbox. Mitchell was able to independently operate all power seating functions (recline, tilt, elevating legrests) to support his multiple needs (edema, skin, fatigue, pain). Mitchell and his wife are to pursue a wheelchair van as well as a portable ramp to get up the 4\" step into the house (recently developed after they closed their porch). Shree from [facility] will deliver chair to home and assist Mitchell with practicing using his chair up/down his driveway to enable him to take his dog outside and get the mail. All parties verbalized satisfaction with chair and its functions.     Education provided in these areas:  Care of new wheelchair   Skin pressure management  How to get service for mobility device  Use of transit tie-downs   Charging batteries            Plan:  Delivery of chair to take place next week; pt and wife did not have a vehicle to transport PWC home. No further needs through [facility] seating clinic at this time.               "

## 2019-07-09 ENCOUNTER — OFFICE VISIT (OUTPATIENT)
Dept: FAMILY MEDICINE | Facility: CLINIC | Age: 69
End: 2019-07-09
Payer: MEDICARE

## 2019-07-09 VITALS
SYSTOLIC BLOOD PRESSURE: 118 MMHG | WEIGHT: 315 LBS | DIASTOLIC BLOOD PRESSURE: 80 MMHG | HEIGHT: 73 IN | HEART RATE: 84 BPM | RESPIRATION RATE: 18 BRPM | BODY MASS INDEX: 41.75 KG/M2

## 2019-07-09 DIAGNOSIS — M15.0 PRIMARY OSTEOARTHRITIS INVOLVING MULTIPLE JOINTS: Primary | ICD-10-CM

## 2019-07-09 DIAGNOSIS — I87.331 CHRONIC VENOUS HYPERTENSION WITH ULCER AND INFLAMMATION INVOLVING RIGHT SIDE (CMS/HCC): ICD-10-CM

## 2019-07-09 DIAGNOSIS — R79.89 ELEVATED SERUM CREATININE: ICD-10-CM

## 2019-07-09 DIAGNOSIS — I48.19 PERSISTENT ATRIAL FIBRILLATION (CMS/HCC): ICD-10-CM

## 2019-07-09 DIAGNOSIS — E11.649 TYPE 2 DIABETES MELLITUS WITH HYPOGLYCEMIA WITHOUT COMA, WITH LONG-TERM CURRENT USE OF INSULIN (CMS/HCC): ICD-10-CM

## 2019-07-09 DIAGNOSIS — I10 BENIGN ESSENTIAL HYPERTENSION: ICD-10-CM

## 2019-07-09 DIAGNOSIS — Z79.4 TYPE 2 DIABETES MELLITUS WITH HYPOGLYCEMIA WITHOUT COMA, WITH LONG-TERM CURRENT USE OF INSULIN (CMS/HCC): ICD-10-CM

## 2019-07-09 PROBLEM — L03.115 CELLULITIS OF RIGHT LOWER EXTREMITY: Status: RESOLVED | Noted: 2018-08-21 | Resolved: 2019-07-09

## 2019-07-09 PROCEDURE — 99214 OFFICE O/P EST MOD 30 MIN: CPT | Performed by: FAMILY MEDICINE

## 2019-07-09 RX ORDER — BUMETANIDE 1 MG/1
1 TABLET ORAL 3 TIMES DAILY
Qty: 270 TABLET | Refills: 3 | Status: SHIPPED | OUTPATIENT
Start: 2019-07-09 | End: 2020-12-31

## 2019-07-09 RX ORDER — POTASSIUM CHLORIDE 20 MEQ/1
20 TABLET, EXTENDED RELEASE ORAL 2 TIMES DAILY
Qty: 180 TABLET | Refills: 1 | Status: SHIPPED | OUTPATIENT
Start: 2019-07-09 | End: 2020-02-11 | Stop reason: SDUPTHER

## 2019-07-09 RX ORDER — GLIMEPIRIDE 4 MG/1
TABLET ORAL
Qty: 135 TABLET | Refills: 3 | Status: SHIPPED | OUTPATIENT
Start: 2019-07-09 | End: 2023-05-23

## 2019-07-09 RX ORDER — ATORVASTATIN CALCIUM 20 MG/1
20 TABLET, FILM COATED ORAL DAILY
Qty: 90 TABLET | Refills: 3 | Status: SHIPPED | OUTPATIENT
Start: 2019-07-09 | End: 2020-04-21 | Stop reason: SDUPTHER

## 2019-07-09 RX ORDER — LISINOPRIL 2.5 MG/1
2.5 TABLET ORAL DAILY
Qty: 90 TABLET | Refills: 3 | Status: SHIPPED | OUTPATIENT
Start: 2019-07-09

## 2019-07-09 RX ORDER — METFORMIN HYDROCHLORIDE 850 MG/1
850 TABLET ORAL 2 TIMES DAILY WITH MEALS
Qty: 180 TABLET | Refills: 3 | Status: SHIPPED | OUTPATIENT
Start: 2019-07-09 | End: 2023-05-23

## 2019-07-09 RX ORDER — CELECOXIB 200 MG/1
200 CAPSULE ORAL
COMMUNITY
End: 2019-07-09 | Stop reason: ALTCHOICE

## 2019-07-09 RX ORDER — PANTOPRAZOLE SODIUM 40 MG/1
40 TABLET, DELAYED RELEASE ORAL DAILY
Qty: 90 TABLET | Refills: 3 | Status: SHIPPED | OUTPATIENT
Start: 2019-07-09 | End: 2020-05-04 | Stop reason: SDUPTHER

## 2019-07-09 ASSESSMENT — ENCOUNTER SYMPTOMS
ARTHRALGIAS: 1
ABDOMINAL PAIN: 0
PALPITATIONS: 0
FEVER: 0
SHORTNESS OF BREATH: 0
CHILLS: 0
HEADACHES: 0
BRUISES/BLEEDS EASILY: 0

## 2019-07-09 NOTE — PROGRESS NOTES
"Subjective      Patient ID: Mitchell Sauceda is a 69 y.o. male.  1950      Patient presents for follow-up to his chronic medical conditions.  His hemoglobin A1c is 6.6.  His blood sugars have been variable and he has had some low blood sugars.  The endocrinologist, Dr. Nettles, has decreased his Humalog 75/30 5 to 10 units twice a day.  He continues to take metformin 850 mg twice a day and Victoza once weekly.  He is also on Amaryl.  He has chronic pain mostly in his knees and hands.  He is currently being prescribed medical marijuana by Dr. Salguero.  He has no headaches or chest pain.  He still has difficulty ambulating despite using a walker.  He has weakness in his legs.  Skin on his legs is improving, he had venous laser procedure done last month.        The following have been reviewed and updated as appropriate in this visit:  Meds  Problems  Med Hx  Surg Hx       Review of Systems   Constitutional: Negative for chills and fever.   Respiratory: Negative for shortness of breath.    Cardiovascular: Positive for leg swelling. Negative for chest pain and palpitations.   Gastrointestinal: Negative for abdominal pain.   Musculoskeletal: Positive for arthralgias and gait problem.   Neurological: Negative for headaches.   Hematological: Does not bruise/bleed easily.       Objective     Vitals:    07/09/19 0912   BP: 118/80   Pulse: 84   Resp: 18   Weight: (!) 148 kg (326 lb)   Height: 1.854 m (6' 1\")     Body mass index is 43.01 kg/m².    Physical Exam   Constitutional: He is oriented to person, place, and time. He appears well-developed and well-nourished.   Pulmonary/Chest: Effort normal and breath sounds normal. No respiratory distress. He has no wheezes. He has no rales.   Abdominal: Soft. Bowel sounds are normal. He exhibits no distension. There is no tenderness.   Neurological: He is alert and oriented to person, place, and time.   Skin:        stasis dermatitis Right LE  No erythema  No " tenderness  Scaly lesions anterior Right LE   Psychiatric: He has a normal mood and affect. His behavior is normal.       Assessment/Plan   Diagnoses and all orders for this visit:    Primary osteoarthritis involving multiple joints (Primary)  Assessment & Plan:  Pain management per Dr. Salguero        Elevated serum creatinine  -     Basic metabolic panel; Future    Benign essential hypertension  Assessment & Plan:  Stable  Continue current medication      Chronic venous hypertension with ulcer and inflammation involving right side (CMS/[facility]) ([facility])  Assessment & Plan:  Improving after procedure      Type 2 diabetes mellitus with hypoglycemia without coma, with long-term current use of insulin (CMS/[facility])  Assessment & Plan:  HgbA1c 6.6  Continue current medications      Persistent atrial fibrillation (CMS/[facility]) ([facility])  Assessment & Plan:  Continue xarelto      Other orders  -     rivaroxaban (XARELTO) 20 mg tablet; Take 1 tablet (20 mg total) by mouth daily with dinner.  -     atorvastatin (LIPITOR) 20 mg tablet; Take 1 tablet (20 mg total) by mouth daily.  -     potassium chloride (KLOR-CON) 20 mEq CR tablet; Take 1 tablet (20 mEq total) by mouth 2 (two) times a day.  -     pantoprazole (PROTONIX) 40 mg EC tablet; Take 1 tablet (40 mg total) by mouth daily.  -     metFORMIN (GLUCOPHAGE) 850 mg tablet; Take 1 tablet (850 mg total) by mouth 2 (two) times a day with meals.  -     lisinopril (PRINIVIL) 2.5 mg tablet; Take 1 tablet (2.5 mg total) by mouth daily.  -     glimepiride (AMARYL) 4 mg tablet; Take one tablet by oral route in morning and 1/2 evening  -     bumetanide (BUMEX) 1 mg tablet; Take 1 tablet (1 mg total) by mouth 3 (three) times a day.

## 2019-09-12 ENCOUNTER — EXTERNAL RECORD (OUTPATIENT)
Dept: HEALTH INFORMATION MANAGEMENT | Facility: OTHER | Age: 69
End: 2019-09-12

## 2019-09-16 ENCOUNTER — TELEPHONE (OUTPATIENT)
Dept: FAMILY MEDICINE | Facility: CLINIC | Age: 69
End: 2019-09-16

## 2019-09-20 ENCOUNTER — CLINICAL SUPPORT (OUTPATIENT)
Dept: FAMILY MEDICINE | Facility: CLINIC | Age: 69
End: 2019-09-20
Payer: MEDICARE

## 2019-09-20 DIAGNOSIS — Z23 NEED FOR INFLUENZA VACCINATION: Primary | ICD-10-CM

## 2019-09-20 PROCEDURE — G0008 ADMIN INFLUENZA VIRUS VAC: HCPCS | Performed by: FAMILY MEDICINE

## 2019-09-20 PROCEDURE — 90653 IIV ADJUVANT VACCINE IM: CPT | Performed by: FAMILY MEDICINE

## 2019-11-04 PROBLEM — I49.8 CHRONOTROPIC INCOMPETENCE WITH SINUS NODE DYSFUNCTION: Status: ACTIVE | Noted: 2019-09-03

## 2019-11-04 PROBLEM — Z86.718 HISTORY OF DVT (DEEP VEIN THROMBOSIS): Status: ACTIVE | Noted: 2017-06-06

## 2019-11-04 PROBLEM — Z83.2 FAMILY HISTORY OF HYPERCOAGULABLE STATE: Status: ACTIVE | Noted: 2017-06-06

## 2019-11-04 PROBLEM — Z86.711 HISTORY OF PULMONARY EMBOLISM: Status: ACTIVE | Noted: 2017-06-06

## 2019-11-04 PROBLEM — R60.9 CHRONIC EDEMA: Status: ACTIVE | Noted: 2019-09-04

## 2019-11-04 PROBLEM — Z95.0 CARDIAC PACEMAKER: Status: ACTIVE | Noted: 2019-09-12

## 2019-11-04 RX ORDER — SILDENAFIL 100 MG/1
TABLET, FILM COATED ORAL
Refills: 0 | COMMUNITY
Start: 2019-09-06 | End: 2023-09-10 | Stop reason: ENTERED-IN-ERROR

## 2019-11-05 ENCOUNTER — OFFICE VISIT (OUTPATIENT)
Dept: FAMILY MEDICINE | Facility: CLINIC | Age: 69
End: 2019-11-05
Payer: MEDICARE

## 2019-11-05 VITALS
TEMPERATURE: 97.9 F | BODY MASS INDEX: 41.75 KG/M2 | HEIGHT: 73 IN | WEIGHT: 315 LBS | RESPIRATION RATE: 18 BRPM | SYSTOLIC BLOOD PRESSURE: 120 MMHG | DIASTOLIC BLOOD PRESSURE: 70 MMHG | HEART RATE: 84 BPM

## 2019-11-05 DIAGNOSIS — E13.40 OTHER SPECIFIED DIABETES MELLITUS WITH DIABETIC NEUROPATHY, UNSPECIFIED (CMS/HCC): Primary | ICD-10-CM

## 2019-11-05 DIAGNOSIS — Z95.0 CARDIAC PACEMAKER: ICD-10-CM

## 2019-11-05 PROBLEM — R00.1 BRADYCARDIA: Status: RESOLVED | Noted: 2019-03-08 | Resolved: 2019-11-05

## 2019-11-05 PROCEDURE — 99213 OFFICE O/P EST LOW 20 MIN: CPT | Performed by: FAMILY MEDICINE

## 2019-11-05 ASSESSMENT — ENCOUNTER SYMPTOMS
BRUISES/BLEEDS EASILY: 0
SLEEP DISTURBANCE: 0
HEADACHES: 0
SHORTNESS OF BREATH: 0
DIZZINESS: 0
NUMBNESS: 1
FATIGUE: 0
ABDOMINAL PAIN: 0
LIGHT-HEADEDNESS: 0

## 2019-11-05 NOTE — PROGRESS NOTES
"Subjective      Patient ID: Mitchell Sauceda is a 69 y.o. male.  1950      Patient presents for follow up for pacemaker placement in September 12, 2019 at [facility].  He is doing well.  No chest pain or SOB.  No headaches or dizziness.  His blood sugars have been good.        The following have been reviewed and updated as appropriate in this visit:  Tobacco  Allergies  Meds  Problems  Med Hx  Surg Hx  Fam Hx       Review of Systems   Constitutional: Negative for fatigue.   Respiratory: Negative for shortness of breath.    Cardiovascular: Negative for chest pain.   Gastrointestinal: Negative for abdominal pain.   Musculoskeletal: Positive for arthralgias.   Neurological: Positive for numbness. Negative for dizziness, light-headedness and headaches.   Hematological: Does not bruise/bleed easily.   Psychiatric/Behavioral: Negative for sleep disturbance.       Objective     Vitals:    11/05/19 1019   BP: 120/70   BP Location: Left upper arm   Patient Position: Sitting   Pulse: 84   Resp: 18   Temp: 36.6 °C (97.9 °F)   Weight: (!) 148 kg (326 lb 12.8 oz)   Height: 1.854 m (6' 1\")     Body mass index is 43.12 kg/m².    Physical Exam   Constitutional: He appears well-developed and well-nourished.   Cardiovascular: Normal rate, regular rhythm and normal heart sounds.   No murmur heard.  Pulmonary/Chest: Effort normal and breath sounds normal. No respiratory distress. He has no wheezes. He has no rales.   Abdominal: Soft. Bowel sounds are normal. He exhibits no distension. There is no tenderness.   Skin: Skin is dry.   Psychiatric: He has a normal mood and affect. His behavior is normal.       Assessment/Plan   Diagnoses and all orders for this visit:    Other specified diabetes mellitus with diabetic neuropathy, unspecified (CMS/[facility]) (Primary)  Assessment & Plan:  Stable  Managed by endocrinologist, Dr. Nettles      Cardiac pacemaker  Assessment & Plan:  Hospital records reviewed        "

## 2019-11-06 ASSESSMENT — ENCOUNTER SYMPTOMS: ARTHRALGIAS: 1

## 2020-02-11 RX ORDER — POTASSIUM CHLORIDE 20 MEQ/1
20 TABLET, EXTENDED RELEASE ORAL 2 TIMES DAILY
Qty: 180 TABLET | Refills: 1 | Status: SHIPPED | OUTPATIENT
Start: 2020-02-11 | End: 2020-08-05

## 2020-02-11 NOTE — TELEPHONE ENCOUNTER
Medicine Refill Request    Last Office Visit: 11/5/2019  Next Office Visit: Visit date not found        Current Outpatient Medications:   •  aluminum sulfate-calcium acetate (DOMEBORO) 952-1,347 mg packet, use 1 packet  for each leg twice daily, Disp: , Rfl:   •  ascorbic acid (VITAMIN C) 500 mg tablet, Take 1 tablet by mouth daily., Disp: , Rfl:   •  atorvastatin (LIPITOR) 20 mg tablet, Take 1 tablet (20 mg total) by mouth daily., Disp: 90 tablet, Rfl: 3  •  blood sugar diagnostic (CONTOUR NEXT STRIPS) strip, for blood sugar monitoring 3x day, Disp: , Rfl:   •  bran-gum-fib-gerard-psyl-kelp-pec (FIBER 6) 1,000 mg tablet, Take 2 capsules by mouth daily., Disp: , Rfl:   •  bumetanide (BUMEX) 1 mg tablet, Take 1 tablet (1 mg total) by mouth 3 (three) times a day., Disp: 270 tablet, Rfl: 3  •  cetirizine (ZyrTEC) 10 mg tablet, Take 10 mg by mouth daily., Disp: , Rfl:   •  cholecalciferol, vitamin D3, (VITAMIN D3) 1,000 unit capsule, one capsule daily, Disp: , Rfl:   •  cyanocobalamin, vitamin B-12, (VITAMIN B-12 ORAL), Take 1 tablet by mouth daily., Disp: , Rfl:   •  diclofenac sodium (VOLTAREN) 1 % topical gel, 1 %., Disp: , Rfl:   •  ferrous sulfate 325 mg (65 mg iron) EC tablet, Take 1 tablet by mouth daily., Disp: , Rfl:   •  gabapentin (NEURONTIN) 800 mg tablet, Take 800 mg by mouth 3 (three) times a day. 1 tab in AM, 1 tab mid day, 2 tabs HS, Disp: , Rfl:   •  glimepiride (AMARYL) 4 mg tablet, Take one tablet by oral route in morning and 1/2 evening, Disp: 135 tablet, Rfl: 3  •  HYDROmorphone (EXALGO ER) 12 mg tablet extended release 24 hr, 12 mg., Disp: , Rfl:   •  insulin lispro protamin/lispro (HUMALOG MIX 75-25,U-100,INSULN SUBQ), Inject 15 Units under the skin 2 (two) times a day.  , Disp: , Rfl:   •  lancets (LANCETS, SUPER THIN) misc, for blood glucose monitoring 3x day, Disp: , Rfl:   •  liraglutide (VICTOZA 3-RUKHSANA) 0.6 mg/0.1 mL (18 mg/3 mL) injection, 0.6mg QD, Disp: , Rfl:   •  lisinopril (PRINIVIL) 2.5 mg  tablet, Take 1 tablet (2.5 mg total) by mouth daily., Disp: 90 tablet, Rfl: 3  •  metFORMIN (GLUCOPHAGE) 850 mg tablet, Take 1 tablet (850 mg total) by mouth 2 (two) times a day with meals., Disp: 180 tablet, Rfl: 3  •  oxyCODONE (OXY-IR) 5 mg capsule, 5 mg., Disp: , Rfl:   •  pantoprazole (PROTONIX) 40 mg EC tablet, Take 1 tablet (40 mg total) by mouth daily., Disp: 90 tablet, Rfl: 3  •  PEN NEEDLE, DIABETIC (BD ULTRA-FINE MICRO PEN NEEDLE MISC), once daily use with Levemir, Disp: , Rfl:   •  polycarbophil (FIBERCON) 625 mg tablet, Take 2 capsules by mouth daily., Disp: , Rfl:   •  polyethylene glycol (MIRALAX) 17 gram/dose powder, take (17G)  by oral route  every day mixed with 8 oz. water, juice, soda, coffee or tea, Disp: , Rfl:   •  potassium chloride (KLOR-CON) 20 mEq CR tablet, Take 1 tablet (20 mEq total) by mouth 2 (two) times a day., Disp: 180 tablet, Rfl: 1  •  rivaroxaban (XARELTO) 20 mg tablet, Take 1 tablet (20 mg total) by mouth daily with dinner., Disp: 90 tablet, Rfl: 3  •  sildenafil (VIAGRA) 100 mg tablet, , Disp: , Rfl: 0      BP Readings from Last 3 Encounters:   11/05/19 120/70   07/09/19 118/80   04/09/19 104/65       Recent Lab results:  Lab Results   Component Value Date    CHOL 137 06/13/2019   ,   Lab Results   Component Value Date    HDL 41 06/13/2019   ,   Lab Results   Component Value Date    LDLCALC 78 06/13/2019   ,   Lab Results   Component Value Date    TRIG 99 06/13/2019        Lab Results   Component Value Date    GLUCOSE 132 (H) 06/13/2019   ,   Lab Results   Component Value Date    HGBA1C 6.6 (H) 06/13/2019         Lab Results   Component Value Date    CREATININE 1.36 (H) 06/13/2019       Lab Results   Component Value Date    TSH 2.49 07/10/2018

## 2020-03-31 ENCOUNTER — TELEPHONE (OUTPATIENT)
Dept: FAMILY MEDICINE | Facility: CLINIC | Age: 70
End: 2020-03-31

## 2020-03-31 NOTE — TELEPHONE ENCOUNTER
Spoke with patient's wife and his right lower eye lid is red and swollen since yesterday.  No drainage. No blurred vision.  Continue warm compresses.  Call if symptoms worsen.

## 2020-03-31 NOTE — TELEPHONE ENCOUNTER
Pts wife called, pts eye is red and puffy/swollen. Wife would like to know if a script can be sent into pharmacy

## 2020-04-03 RX ORDER — CEPHALEXIN 500 MG/1
500 CAPSULE ORAL 2 TIMES DAILY
Qty: 14 CAPSULE | Refills: 0 | Status: SHIPPED | OUTPATIENT
Start: 2020-04-03 | End: 2020-04-10

## 2020-04-03 RX ORDER — ERYTHROMYCIN 5 MG/G
OINTMENT OPHTHALMIC
Qty: 3.5 G | Refills: 1 | Status: SHIPPED | OUTPATIENT
Start: 2020-04-03 | End: 2020-04-08

## 2020-04-03 NOTE — TELEPHONE ENCOUNTER
Ok, let her know I sent in script for eye ointment and oral antibiotics.  Continue warm compresses and call if it is not getting better on the medications.

## 2020-04-03 NOTE — TELEPHONE ENCOUNTER
"Call from [facility]. States pt's eye is worse. States it is definitely a \"stye\" but when she pulls lid down, there is an ulceration in the lid (white pus looking). Asking if you think medication would be necessary or just continue to wait and see?  "

## 2020-04-21 RX ORDER — ATORVASTATIN CALCIUM 20 MG/1
20 TABLET, FILM COATED ORAL DAILY
Qty: 90 TABLET | Refills: 3 | Status: SHIPPED | OUTPATIENT
Start: 2020-04-21 | End: 2021-04-19

## 2020-05-04 RX ORDER — PANTOPRAZOLE SODIUM 40 MG/1
40 TABLET, DELAYED RELEASE ORAL DAILY
Qty: 90 TABLET | Refills: 3 | Status: SHIPPED | OUTPATIENT
Start: 2020-05-04 | End: 2021-04-26

## 2020-05-04 NOTE — TELEPHONE ENCOUNTER
Medicine Refill Request    Last Office Visit: 11/5/2019  Last Telemedicine Visit: Visit date not found    Next Office Visit: Visit date not found  Next Telemedicine Visit: Visit date not found         Current Outpatient Medications:   •  aluminum sulfate-calcium acetate (DOMEBORO) 952-1,347 mg packet, use 1 packet  for each leg twice daily, Disp: , Rfl:   •  ascorbic acid (VITAMIN C) 500 mg tablet, Take 1 tablet by mouth daily., Disp: , Rfl:   •  atorvastatin (LIPITOR) 20 mg tablet, Take 1 tablet (20 mg total) by mouth daily., Disp: 90 tablet, Rfl: 3  •  blood sugar diagnostic (CONTOUR NEXT STRIPS) strip, for blood sugar monitoring 3x day, Disp: , Rfl:   •  bran-gum-fib-gerard-psyl-kelp-pec (FIBER 6) 1,000 mg tablet, Take 2 capsules by mouth daily., Disp: , Rfl:   •  bumetanide (BUMEX) 1 mg tablet, Take 1 tablet (1 mg total) by mouth 3 (three) times a day., Disp: 270 tablet, Rfl: 3  •  cetirizine (ZyrTEC) 10 mg tablet, Take 10 mg by mouth daily., Disp: , Rfl:   •  cholecalciferol, vitamin D3, (VITAMIN D3) 1,000 unit capsule, one capsule daily, Disp: , Rfl:   •  cyanocobalamin, vitamin B-12, (VITAMIN B-12 ORAL), Take 1 tablet by mouth daily., Disp: , Rfl:   •  diclofenac sodium (VOLTAREN) 1 % topical gel, 1 %., Disp: , Rfl:   •  ferrous sulfate 325 mg (65 mg iron) EC tablet, Take 1 tablet by mouth daily., Disp: , Rfl:   •  gabapentin (NEURONTIN) 800 mg tablet, Take 800 mg by mouth 3 (three) times a day. 1 tab in AM, 1 tab mid day, 2 tabs HS, Disp: , Rfl:   •  glimepiride (AMARYL) 4 mg tablet, Take one tablet by oral route in morning and 1/2 evening, Disp: 135 tablet, Rfl: 3  •  HYDROmorphone (EXALGO ER) 12 mg tablet extended release 24 hr, 12 mg., Disp: , Rfl:   •  insulin lispro protamin/lispro (HUMALOG MIX 75-25,U-100,INSULN SUBQ), Inject 15 Units under the skin 2 (two) times a day.  , Disp: , Rfl:   •  lancets (LANCETS, SUPER THIN) misc, for blood glucose monitoring 3x day, Disp: , Rfl:   •  liraglutide (VICTOZA  3-RUKHSANA) 0.6 mg/0.1 mL (18 mg/3 mL) injection, 0.6mg QD, Disp: , Rfl:   •  lisinopril (PRINIVIL) 2.5 mg tablet, Take 1 tablet (2.5 mg total) by mouth daily., Disp: 90 tablet, Rfl: 3  •  metFORMIN (GLUCOPHAGE) 850 mg tablet, Take 1 tablet (850 mg total) by mouth 2 (two) times a day with meals., Disp: 180 tablet, Rfl: 3  •  oxyCODONE (OXY-IR) 5 mg capsule, 5 mg., Disp: , Rfl:   •  pantoprazole (PROTONIX) 40 mg EC tablet, Take 1 tablet (40 mg total) by mouth daily., Disp: 90 tablet, Rfl: 3  •  PEN NEEDLE, DIABETIC (BD ULTRA-FINE MICRO PEN NEEDLE MISC), once daily use with Levemir, Disp: , Rfl:   •  polycarbophil (FIBERCON) 625 mg tablet, Take 2 capsules by mouth daily., Disp: , Rfl:   •  polyethylene glycol (MIRALAX) 17 gram/dose powder, take (17G)  by oral route  every day mixed with 8 oz. water, juice, soda, coffee or tea, Disp: , Rfl:   •  potassium chloride (KLOR-CON) 20 mEq CR tablet, Take 1 tablet (20 mEq total) by mouth 2 (two) times a day., Disp: 180 tablet, Rfl: 1  •  rivaroxaban (XARELTO) 20 mg tablet, Take 1 tablet (20 mg total) by mouth daily with dinner., Disp: 90 tablet, Rfl: 3  •  sildenafil (VIAGRA) 100 mg tablet, , Disp: , Rfl: 0      BP Readings from Last 3 Encounters:   11/05/19 120/70   07/09/19 118/80   04/09/19 104/65       Recent Lab results:  Lab Results   Component Value Date    CHOL 137 06/13/2019   ,   Lab Results   Component Value Date    HDL 41 06/13/2019   ,   Lab Results   Component Value Date    LDLCALC 78 06/13/2019   ,   Lab Results   Component Value Date    TRIG 99 06/13/2019        Lab Results   Component Value Date    GLUCOSE 132 (H) 06/13/2019   ,   Lab Results   Component Value Date    HGBA1C 6.6 (H) 06/13/2019         Lab Results   Component Value Date    CREATININE 1.36 (H) 06/13/2019       Lab Results   Component Value Date    TSH 2.49 07/10/2018

## 2020-08-05 RX ORDER — POTASSIUM CHLORIDE 1500 MG/1
TABLET, EXTENDED RELEASE ORAL
Qty: 180 TABLET | Refills: 1 | Status: SHIPPED | OUTPATIENT
Start: 2020-08-05 | End: 2021-01-25

## 2020-09-23 ENCOUNTER — CLINICAL SUPPORT (OUTPATIENT)
Dept: FAMILY MEDICINE | Facility: CLINIC | Age: 70
End: 2020-09-23
Payer: MEDICARE

## 2020-09-23 DIAGNOSIS — Z23 NEED FOR INFLUENZA VACCINATION: Primary | ICD-10-CM

## 2020-09-23 PROCEDURE — 90694 VACC AIIV4 NO PRSRV 0.5ML IM: CPT | Performed by: FAMILY MEDICINE

## 2020-09-23 PROCEDURE — G0008 ADMIN INFLUENZA VIRUS VAC: HCPCS | Performed by: FAMILY MEDICINE

## 2020-12-30 NOTE — TELEPHONE ENCOUNTER
Medicine Refill Request    Last Office Visit: 11/5/2019  Last Telemedicine Visit: Visit date not found    Next Office Visit: Visit date not found  Next Telemedicine Visit: Visit date not found         Current Outpatient Medications:   •  aluminum sulfate-calcium acetate (DOMEBORO) 952-1,347 mg packet, use 1 packet  for each leg twice daily, Disp: , Rfl:   •  ascorbic acid (VITAMIN C) 500 mg tablet, Take 1 tablet by mouth daily., Disp: , Rfl:   •  atorvastatin (LIPITOR) 20 mg tablet, Take 1 tablet (20 mg total) by mouth daily., Disp: 90 tablet, Rfl: 3  •  blood sugar diagnostic (CONTOUR NEXT STRIPS) strip, for blood sugar monitoring 3x day, Disp: , Rfl:   •  bran-gum-fib-gerard-psyl-kelp-pec (FIBER 6) 1,000 mg tablet, Take 2 capsules by mouth daily., Disp: , Rfl:   •  bumetanide (BUMEX) 1 mg tablet, Take 1 tablet (1 mg total) by mouth 3 (three) times a day., Disp: 270 tablet, Rfl: 3  •  cetirizine (ZyrTEC) 10 mg tablet, Take 10 mg by mouth daily., Disp: , Rfl:   •  cholecalciferol, vitamin D3, (VITAMIN D3) 1,000 unit capsule, one capsule daily, Disp: , Rfl:   •  cyanocobalamin, vitamin B-12, (VITAMIN B-12 ORAL), Take 1 tablet by mouth daily., Disp: , Rfl:   •  diclofenac sodium (VOLTAREN) 1 % topical gel, 1 %., Disp: , Rfl:   •  ferrous sulfate 325 mg (65 mg iron) EC tablet, Take 1 tablet by mouth daily., Disp: , Rfl:   •  gabapentin (NEURONTIN) 800 mg tablet, Take 800 mg by mouth 3 (three) times a day. 1 tab in AM, 1 tab mid day, 2 tabs HS, Disp: , Rfl:   •  glimepiride (AMARYL) 4 mg tablet, Take one tablet by oral route in morning and 1/2 evening, Disp: 135 tablet, Rfl: 3  •  HYDROmorphone (EXALGO ER) 12 mg tablet extended release 24 hr, 12 mg., Disp: , Rfl:   •  insulin lispro protamin/lispro (HUMALOG MIX 75-25,U-100,INSULN SUBQ), Inject 15 Units under the skin 2 (two) times a day.  , Disp: , Rfl:   •  KLOR-CON M20 20 mEq CR tablet, TAKE 1 TABLET BY MOUTH TWICE A DAY, Disp: 180 tablet, Rfl: 1  •  lancets (LANCETS,  SUPER THIN) misc, for blood glucose monitoring 3x day, Disp: , Rfl:   •  liraglutide (VICTOZA 3-RUKHSANA) 0.6 mg/0.1 mL (18 mg/3 mL) injection, 0.6mg QD, Disp: , Rfl:   •  lisinopril (PRINIVIL) 2.5 mg tablet, Take 1 tablet (2.5 mg total) by mouth daily., Disp: 90 tablet, Rfl: 3  •  metFORMIN (GLUCOPHAGE) 850 mg tablet, Take 1 tablet (850 mg total) by mouth 2 (two) times a day with meals., Disp: 180 tablet, Rfl: 3  •  oxyCODONE (OXY-IR) 5 mg capsule, 5 mg., Disp: , Rfl:   •  pantoprazole (PROTONIX) 40 mg EC tablet, Take 1 tablet (40 mg total) by mouth daily., Disp: 90 tablet, Rfl: 3  •  PEN NEEDLE, DIABETIC (BD ULTRA-FINE MICRO PEN NEEDLE MISC), once daily use with Levemir, Disp: , Rfl:   •  polycarbophil (FIBERCON) 625 mg tablet, Take 2 capsules by mouth daily., Disp: , Rfl:   •  polyethylene glycol (MIRALAX) 17 gram/dose powder, take (17G)  by oral route  every day mixed with 8 oz. water, juice, soda, coffee or tea, Disp: , Rfl:   •  rivaroxaban (XARELTO) 20 mg tablet, Take 1 tablet (20 mg total) by mouth daily with dinner., Disp: 90 tablet, Rfl: 3  •  sildenafil (VIAGRA) 100 mg tablet, , Disp: , Rfl: 0      BP Readings from Last 3 Encounters:   11/05/19 120/70   07/09/19 118/80   04/09/19 104/65       Recent Lab results:  Lab Results   Component Value Date    CHOL 137 06/13/2019   ,   Lab Results   Component Value Date    HDL 41 06/13/2019   ,   Lab Results   Component Value Date    LDLCALC 78 06/13/2019   ,   Lab Results   Component Value Date    TRIG 99 06/13/2019        Lab Results   Component Value Date    GLUCOSE 132 (H) 06/13/2019   ,   Lab Results   Component Value Date    HGBA1C 6.6 (H) 06/13/2019         Lab Results   Component Value Date    CREATININE 1.36 (H) 06/13/2019       Lab Results   Component Value Date    TSH 2.49 07/10/2018

## 2020-12-31 RX ORDER — BUMETANIDE 1 MG/1
TABLET ORAL
Qty: 270 TABLET | Refills: 0 | Status: SHIPPED | OUTPATIENT
Start: 2020-12-31 | End: 2021-03-03

## 2021-01-25 RX ORDER — POTASSIUM CHLORIDE 1500 MG/1
TABLET, EXTENDED RELEASE ORAL
Qty: 180 TABLET | Refills: 1 | Status: SHIPPED | OUTPATIENT
Start: 2021-01-25 | End: 2021-07-21

## 2021-01-25 NOTE — TELEPHONE ENCOUNTER
Medicine Refill Request    Last Office Visit: 11/5/2019  Last Telemedicine Visit: Visit date not found    Next Office Visit: 2/24/2021  Next Telemedicine Visit: Visit date not found         Current Outpatient Medications:   •  aluminum sulfate-calcium acetate (DOMEBORO) 952-1,347 mg packet, use 1 packet  for each leg twice daily, Disp: , Rfl:   •  ascorbic acid (VITAMIN C) 500 mg tablet, Take 1 tablet by mouth daily., Disp: , Rfl:   •  atorvastatin (LIPITOR) 20 mg tablet, Take 1 tablet (20 mg total) by mouth daily., Disp: 90 tablet, Rfl: 3  •  blood sugar diagnostic (CONTOUR NEXT STRIPS) strip, for blood sugar monitoring 3x day, Disp: , Rfl:   •  bran-gum-fib-gerard-psyl-kelp-pec (FIBER 6) 1,000 mg tablet, Take 2 capsules by mouth daily., Disp: , Rfl:   •  bumetanide (BUMEX) 1 mg tablet, TAKE 1 TABLET BY MOUTH 3  TIMES A DAY, Disp: 270 tablet, Rfl: 0  •  cetirizine (ZyrTEC) 10 mg tablet, Take 10 mg by mouth daily., Disp: , Rfl:   •  cholecalciferol, vitamin D3, (VITAMIN D3) 1,000 unit capsule, one capsule daily, Disp: , Rfl:   •  cyanocobalamin, vitamin B-12, (VITAMIN B-12 ORAL), Take 1 tablet by mouth daily., Disp: , Rfl:   •  diclofenac sodium (VOLTAREN) 1 % topical gel, 1 %., Disp: , Rfl:   •  ferrous sulfate 325 mg (65 mg iron) EC tablet, Take 1 tablet by mouth daily., Disp: , Rfl:   •  gabapentin (NEURONTIN) 800 mg tablet, Take 800 mg by mouth 3 (three) times a day. 1 tab in AM, 1 tab mid day, 2 tabs HS, Disp: , Rfl:   •  glimepiride (AMARYL) 4 mg tablet, Take one tablet by oral route in morning and 1/2 evening, Disp: 135 tablet, Rfl: 3  •  HYDROmorphone (EXALGO ER) 12 mg tablet extended release 24 hr, 12 mg., Disp: , Rfl:   •  insulin lispro protamin/lispro (HUMALOG MIX 75-25,U-100,INSULN SUBQ), Inject 15 Units under the skin 2 (two) times a day.  , Disp: , Rfl:   •  KLOR-CON M20 20 mEq CR tablet, TAKE 1 TABLET BY MOUTH TWICE A DAY, Disp: 180 tablet, Rfl: 1  •  lancets (LANCETS, SUPER THIN) misc, for blood  glucose monitoring 3x day, Disp: , Rfl:   •  liraglutide (VICTOZA 3-RUKHSANA) 0.6 mg/0.1 mL (18 mg/3 mL) injection, 0.6mg QD, Disp: , Rfl:   •  lisinopril (PRINIVIL) 2.5 mg tablet, Take 1 tablet (2.5 mg total) by mouth daily., Disp: 90 tablet, Rfl: 3  •  metFORMIN (GLUCOPHAGE) 850 mg tablet, Take 1 tablet (850 mg total) by mouth 2 (two) times a day with meals., Disp: 180 tablet, Rfl: 3  •  oxyCODONE (OXY-IR) 5 mg capsule, 5 mg., Disp: , Rfl:   •  pantoprazole (PROTONIX) 40 mg EC tablet, Take 1 tablet (40 mg total) by mouth daily., Disp: 90 tablet, Rfl: 3  •  PEN NEEDLE, DIABETIC (BD ULTRA-FINE MICRO PEN NEEDLE MISC), once daily use with Levemir, Disp: , Rfl:   •  polycarbophil (FIBERCON) 625 mg tablet, Take 2 capsules by mouth daily., Disp: , Rfl:   •  polyethylene glycol (MIRALAX) 17 gram/dose powder, take (17G)  by oral route  every day mixed with 8 oz. water, juice, soda, coffee or tea, Disp: , Rfl:   •  rivaroxaban (XARELTO) 20 mg tablet, Take 1 tablet (20 mg total) by mouth daily with dinner., Disp: 90 tablet, Rfl: 3  •  sildenafil (VIAGRA) 100 mg tablet, , Disp: , Rfl: 0      BP Readings from Last 3 Encounters:   11/05/19 120/70   07/09/19 118/80   04/09/19 104/65       Recent Lab results:  Lab Results   Component Value Date    CHOL 137 06/13/2019   ,   Lab Results   Component Value Date    HDL 41 06/13/2019   ,   Lab Results   Component Value Date    LDLCALC 78 06/13/2019   ,   Lab Results   Component Value Date    TRIG 99 06/13/2019        Lab Results   Component Value Date    GLUCOSE 132 (H) 06/13/2019   ,   Lab Results   Component Value Date    HGBA1C 6.6 (H) 06/13/2019         Lab Results   Component Value Date    CREATININE 1.36 (H) 06/13/2019       Lab Results   Component Value Date    TSH 2.49 07/10/2018

## 2021-04-08 ENCOUNTER — TRANSCRIBE ORDERS (OUTPATIENT)
Dept: RADIOLOGY | Age: 71
End: 2021-04-08

## 2021-04-08 ENCOUNTER — HOSPITAL ENCOUNTER (OUTPATIENT)
Dept: RADIOLOGY | Age: 71
Discharge: HOME | End: 2021-04-08
Attending: FAMILY MEDICINE
Payer: MEDICARE

## 2021-04-08 DIAGNOSIS — L89.622 PRESSURE ULCER OF LEFT HEEL, STAGE 2 (CMS/HCC): Primary | ICD-10-CM

## 2021-04-08 DIAGNOSIS — I87.2 VENOUS INSUFFICIENCY (CHRONIC) (PERIPHERAL): ICD-10-CM

## 2021-04-08 DIAGNOSIS — L89.622 PRESSURE ULCER OF LEFT HEEL, STAGE 2 (CMS/HCC): ICD-10-CM

## 2021-04-08 DIAGNOSIS — I89.0 LYMPHEDEMA, NOT ELSEWHERE CLASSIFIED: ICD-10-CM

## 2021-04-08 DIAGNOSIS — E66.01 MORBID (SEVERE) OBESITY DUE TO EXCESS CALORIES (CMS/HCC): ICD-10-CM

## 2021-04-08 PROCEDURE — 73650 X-RAY EXAM OF HEEL: CPT | Mod: LT

## 2021-04-19 RX ORDER — ATORVASTATIN CALCIUM 20 MG/1
TABLET, FILM COATED ORAL
Qty: 90 TABLET | Refills: 3 | Status: SHIPPED | OUTPATIENT
Start: 2021-04-19 | End: 2022-04-08

## 2021-04-19 NOTE — TELEPHONE ENCOUNTER
Medicine Refill Request    Last Office Visit: 11/5/2019  Last Telemedicine Visit: Visit date not found    Next Office Visit: Visit date not found  Next Telemedicine Visit: Visit date not found         Current Outpatient Medications:   •  aluminum sulfate-calcium acetate (DOMEBORO) 952-1,347 mg packet, use 1 packet  for each leg twice daily, Disp: , Rfl:   •  ascorbic acid (VITAMIN C) 500 mg tablet, Take 1 tablet by mouth daily., Disp: , Rfl:   •  atorvastatin (LIPITOR) 20 mg tablet, Take 1 tablet (20 mg total) by mouth daily., Disp: 90 tablet, Rfl: 3  •  blood sugar diagnostic (CONTOUR NEXT STRIPS) strip, for blood sugar monitoring 3x day, Disp: , Rfl:   •  bran-gum-fib-gerard-psyl-kelp-pec (FIBER 6) 1,000 mg tablet, Take 2 capsules by mouth daily., Disp: , Rfl:   •  bumetanide (BUMEX) 1 mg tablet, TAKE 1 TABLET BY MOUTH 3  TIMES DAILY, Disp: 270 tablet, Rfl: 3  •  cetirizine (ZyrTEC) 10 mg tablet, Take 10 mg by mouth daily., Disp: , Rfl:   •  cholecalciferol, vitamin D3, (VITAMIN D3) 1,000 unit capsule, one capsule daily, Disp: , Rfl:   •  cyanocobalamin, vitamin B-12, (VITAMIN B-12 ORAL), Take 1 tablet by mouth daily., Disp: , Rfl:   •  diclofenac sodium (VOLTAREN) 1 % topical gel, 1 %., Disp: , Rfl:   •  ferrous sulfate 325 mg (65 mg iron) EC tablet, Take 1 tablet by mouth daily., Disp: , Rfl:   •  gabapentin (NEURONTIN) 800 mg tablet, Take 800 mg by mouth 3 (three) times a day. 1 tab in AM, 1 tab mid day, 2 tabs HS, Disp: , Rfl:   •  glimepiride (AMARYL) 4 mg tablet, Take one tablet by oral route in morning and 1/2 evening, Disp: 135 tablet, Rfl: 3  •  HYDROmorphone (EXALGO ER) 12 mg tablet extended release 24 hr, 12 mg., Disp: , Rfl:   •  insulin lispro protamin/lispro (HUMALOG MIX 75-25,U-100,INSULN SUBQ), Inject 15 Units under the skin 2 (two) times a day.  , Disp: , Rfl:   •  KLOR-CON M20 20 mEq CR tablet, TAKE 1 TABLET BY MOUTH TWICE A DAY, Disp: 180 tablet, Rfl: 1  •  lancets (LANCETS, SUPER THIN) misc, for  blood glucose monitoring 3x day, Disp: , Rfl:   •  liraglutide (VICTOZA 3-RUKHSANA) 0.6 mg/0.1 mL (18 mg/3 mL) injection, 0.6mg QD, Disp: , Rfl:   •  lisinopril (PRINIVIL) 2.5 mg tablet, Take 1 tablet (2.5 mg total) by mouth daily., Disp: 90 tablet, Rfl: 3  •  metFORMIN (GLUCOPHAGE) 850 mg tablet, Take 1 tablet (850 mg total) by mouth 2 (two) times a day with meals., Disp: 180 tablet, Rfl: 3  •  oxyCODONE (OXY-IR) 5 mg capsule, 5 mg., Disp: , Rfl:   •  pantoprazole (PROTONIX) 40 mg EC tablet, Take 1 tablet (40 mg total) by mouth daily., Disp: 90 tablet, Rfl: 3  •  PEN NEEDLE, DIABETIC (BD ULTRA-FINE MICRO PEN NEEDLE MISC), once daily use with Levemir, Disp: , Rfl:   •  polycarbophil (FIBERCON) 625 mg tablet, Take 2 capsules by mouth daily., Disp: , Rfl:   •  polyethylene glycol (MIRALAX) 17 gram/dose powder, take (17G)  by oral route  every day mixed with 8 oz. water, juice, soda, coffee or tea, Disp: , Rfl:   •  rivaroxaban (XARELTO) 20 mg tablet, Take 1 tablet (20 mg total) by mouth daily with dinner., Disp: 90 tablet, Rfl: 3  •  sildenafil (VIAGRA) 100 mg tablet, , Disp: , Rfl: 0      BP Readings from Last 3 Encounters:   11/05/19 120/70   07/09/19 118/80   04/09/19 104/65       Recent Lab results:  Lab Results   Component Value Date    CHOL 137 06/13/2019   ,   Lab Results   Component Value Date    HDL 41 06/13/2019   ,   Lab Results   Component Value Date    LDLCALC 78 06/13/2019   ,   Lab Results   Component Value Date    TRIG 99 06/13/2019        Lab Results   Component Value Date    GLUCOSE 132 (H) 06/13/2019   ,   Lab Results   Component Value Date    HGBA1C 6.6 (H) 06/13/2019         Lab Results   Component Value Date    CREATININE 1.36 (H) 06/13/2019       Lab Results   Component Value Date    TSH 2.49 07/10/2018

## 2021-04-22 ENCOUNTER — PATIENT OUTREACH (OUTPATIENT)
Dept: CASE MANAGEMENT | Facility: CLINIC | Age: 71
End: 2021-04-22

## 2021-04-22 ASSESSMENT — ENCOUNTER SYMPTOMS
WOUND: 1
APPETITE CHANGE: 1
CONSTIPATION: 0
FEVER: 0
COUGH: 0
WHEEZING: 0
DIARRHEA: 0
SHORTNESS OF BREATH: 0
FATIGUE: 1
NAUSEA: 0

## 2021-04-22 NOTE — PROGRESS NOTES
"  NAME: Mitchell Sauceda    MRN: [id number]    YOB: 1950    Event Review:    Assessment completed with: Spouse or Significant Other  Patient stated reason for hospitalization: He fell in bathroom  Discharge Diagnosis: Sepsis    Patient readmitted in the last 30 days: No  Discharging Facility: [facility]  Date of Admission: 04/17/21  Date of Discharge: 04/21/21               Patient's perception of their health status since discharge: Improving    HPI: patient was admitted after a fall at home and change in mental status, presumed acute systolic  CHF exacerbation and hypertension. He was on antibiotics initially but DC'd due to afebrile status and no leukocytosis.  He was seen by cardiology and was diuresed with IV Bumex. ECHO showed EF of 55%. Pacemanker was interrogated.  He was DC'd on same medications and home care.  Per patient's wife he seems to be doing better. She noted that he does have a \"bedsore' from the hospital bed. He is ambulating with his RW in the house. He was already seen by visiting nurse today and blood glucose reported as good.       Review of Systems   Constitutional: Positive for appetite change and fatigue. Negative for fever.   Respiratory: Negative for cough, shortness of breath and wheezing.    Cardiovascular: Negative for chest pain and leg swelling.   Gastrointestinal: Negative for constipation, diarrhea and nausea.   Skin: Positive for wound.       Medication Review:    Medication Review: No  If No, please explain: Pt reviewed with Visiting Nurse  Reported by: Spouse  Any new medications prescribed at discharge?: No  Is the patient having any side effects they believe may be caused by any medication additions or changes?: No        Do you have enough of your regularly prescribed medications?: Yes       Medication adherence problem?: No  Was a medication discrepancy indentified?: No       Nursing Interventions: No intervention needed  Reconciled the " current and discharge medications: No  Reviewed AVS (Discharge Instructions)?: Yes         Acute Pain:    Acute pain: No                Chronic Pain:    Chronic pain: No                Diet/Nutrition:    Type of Diet: Diabetic  Diet Adherence: Adherent with diet          Home Care Services:    Home Care Agency: [facility]  Type of Home Care Services: Home PT, Home OT, Home Nursing        Post-Discharge Durable Medical Equipment::    Durable Medical Equipment: Front wheeled walker, Shower/tub seat, Grab bars, Ramp, CPAP/BiPAP, Glucometer  Oxygen Use: No                   Home Management:    Living Arrangement: Spouse  Support System:: Spouse  Type of Residence: [address] [place] [address]  Home Monitoring: Blood Sugars  Any patient reported falls in the last 3 months?: Yes  Jew or spiritual beliefs that impact treatment?: No    Appointment Scheduling:    TCM Appointment Types: MARTIR 7 Day  Appointment Date: 04/27/21     Appointment Provider: PCP  Patient Scheduling Dispositions: Appt scheduled by ACM     Follow-Ups:  Needs CBC w/diff in 1 week      Relevant Specialist Follow-ups: cardiology Dr No,    Interventions/ Care Coordination:    Interventions/ Care Coordination: Addressed a knowledge deficit, Assisted patient in the scheduling of an appointment  General Education: CHF, Bleeding Precautions while on ASA, DOAC, Injectable Blood Thinners       Reviewed signs/symptoms of worsening condition or complication that necessitate a call to the Physician's office.  Educated patient on access to care.  RN phone number given for future care management.    Nu Juarez RN  [phone]

## 2021-04-26 RX ORDER — PANTOPRAZOLE SODIUM 40 MG/1
TABLET, DELAYED RELEASE ORAL
Qty: 90 TABLET | Refills: 3 | Status: SHIPPED | OUTPATIENT
Start: 2021-04-26 | End: 2022-04-15

## 2021-04-26 NOTE — TELEPHONE ENCOUNTER
Medicine Refill Request    Last Office Visit: 11/5/2019  Last Telemedicine Visit: Visit date not found    Next Office Visit: Visit date not found  Next Telemedicine Visit: Visit date not found         Current Outpatient Medications:   •  aluminum sulfate-calcium acetate (DOMEBORO) 952-1,347 mg packet, use 1 packet  for each leg twice daily, Disp: , Rfl:   •  ascorbic acid (VITAMIN C) 500 mg tablet, Take 1 tablet by mouth daily., Disp: , Rfl:   •  atorvastatin (LIPITOR) 20 mg tablet, TAKE 1 TABLET BY MOUTH EVERY DAY, Disp: 90 tablet, Rfl: 3  •  blood sugar diagnostic (CONTOUR NEXT STRIPS) strip, for blood sugar monitoring 3x day, Disp: , Rfl:   •  bran-gum-fib-gerard-psyl-kelp-pec (FIBER 6) 1,000 mg tablet, Take 2 capsules by mouth daily., Disp: , Rfl:   •  bumetanide (BUMEX) 1 mg tablet, TAKE 1 TABLET BY MOUTH 3  TIMES DAILY, Disp: 270 tablet, Rfl: 3  •  cetirizine (ZyrTEC) 10 mg tablet, Take 10 mg by mouth daily., Disp: , Rfl:   •  cholecalciferol, vitamin D3, (VITAMIN D3) 1,000 unit capsule, one capsule daily, Disp: , Rfl:   •  cyanocobalamin, vitamin B-12, (VITAMIN B-12 ORAL), Take 1 tablet by mouth daily., Disp: , Rfl:   •  diclofenac sodium (VOLTAREN) 1 % topical gel, 1 %., Disp: , Rfl:   •  ferrous sulfate 325 mg (65 mg iron) EC tablet, Take 1 tablet by mouth daily., Disp: , Rfl:   •  gabapentin (NEURONTIN) 800 mg tablet, Take 800 mg by mouth 3 (three) times a day. 1 tab in AM, 1 tab mid day, 2 tabs HS, Disp: , Rfl:   •  glimepiride (AMARYL) 4 mg tablet, Take one tablet by oral route in morning and 1/2 evening, Disp: 135 tablet, Rfl: 3  •  HYDROmorphone (EXALGO ER) 12 mg tablet extended release 24 hr, 12 mg., Disp: , Rfl:   •  insulin lispro protamin/lispro (HUMALOG MIX 75-25,U-100,INSULN SUBQ), Inject 15 Units under the skin 2 (two) times a day.  , Disp: , Rfl:   •  KLOR-CON M20 20 mEq CR tablet, TAKE 1 TABLET BY MOUTH TWICE A DAY, Disp: 180 tablet, Rfl: 1  •  lancets (LANCETS, SUPER THIN) misc, for blood  glucose monitoring 3x day, Disp: , Rfl:   •  liraglutide (VICTOZA 3-RUKHSANA) 0.6 mg/0.1 mL (18 mg/3 mL) injection, 0.6mg QD, Disp: , Rfl:   •  lisinopril (PRINIVIL) 2.5 mg tablet, Take 1 tablet (2.5 mg total) by mouth daily., Disp: 90 tablet, Rfl: 3  •  metFORMIN (GLUCOPHAGE) 850 mg tablet, Take 1 tablet (850 mg total) by mouth 2 (two) times a day with meals., Disp: 180 tablet, Rfl: 3  •  oxyCODONE (OXY-IR) 5 mg capsule, 5 mg., Disp: , Rfl:   •  pantoprazole (PROTONIX) 40 mg EC tablet, Take 1 tablet (40 mg total) by mouth daily., Disp: 90 tablet, Rfl: 3  •  PEN NEEDLE, DIABETIC (BD ULTRA-FINE MICRO PEN NEEDLE MISC), once daily use with Levemir, Disp: , Rfl:   •  polycarbophil (FIBERCON) 625 mg tablet, Take 2 capsules by mouth daily., Disp: , Rfl:   •  polyethylene glycol (MIRALAX) 17 gram/dose powder, take (17G)  by oral route  every day mixed with 8 oz. water, juice, soda, coffee or tea, Disp: , Rfl:   •  rivaroxaban (XARELTO) 20 mg tablet, Take 1 tablet (20 mg total) by mouth daily with dinner., Disp: 90 tablet, Rfl: 3  •  sildenafil (VIAGRA) 100 mg tablet, , Disp: , Rfl: 0      BP Readings from Last 3 Encounters:   11/05/19 120/70   07/09/19 118/80   04/09/19 104/65       Recent Lab results:  Lab Results   Component Value Date    CHOL 137 06/13/2019   ,   Lab Results   Component Value Date    HDL 41 06/13/2019   ,   Lab Results   Component Value Date    LDLCALC 78 06/13/2019   ,   Lab Results   Component Value Date    TRIG 99 06/13/2019        Lab Results   Component Value Date    GLUCOSE 132 (H) 06/13/2019   ,   Lab Results   Component Value Date    HGBA1C 6.6 (H) 06/13/2019         Lab Results   Component Value Date    CREATININE 1.36 (H) 06/13/2019       Lab Results   Component Value Date    TSH 2.49 07/10/2018

## 2021-04-28 ENCOUNTER — PATIENT OUTREACH (OUTPATIENT)
Dept: CASE MANAGEMENT | Facility: CLINIC | Age: 71
End: 2021-04-28

## 2021-04-28 NOTE — PROGRESS NOTES
TCM f/u call. Briefly spoke with patient's wife who reported that he is doing well. He just had OT there today and  Is progressing nicely.

## 2021-05-09 ENCOUNTER — EXTERNAL RECORD (OUTPATIENT)
Dept: HEALTH INFORMATION MANAGEMENT | Facility: OTHER | Age: 71
End: 2021-05-09

## 2021-05-13 ENCOUNTER — PATIENT OUTREACH (OUTPATIENT)
Dept: CASE MANAGEMENT | Facility: CLINIC | Age: 71
End: 2021-05-13

## 2021-05-13 ASSESSMENT — ENCOUNTER SYMPTOMS
VOMITING: 0
DIZZINESS: 0
ABDOMINAL PAIN: 0
NAUSEA: 0
APPETITE CHANGE: 0
LIGHT-HEADEDNESS: 0
SHORTNESS OF BREATH: 0
DIARRHEA: 0
DIFFICULTY URINATING: 0

## 2021-05-13 NOTE — PROGRESS NOTES
NAME: Mitchell Sauceda    MRN: [id number]    YOB: 1950    Event Review:    Assessment completed with: Spouse or Significant Other     Discharge Diagnosis: Altered mental status    Patient readmitted in the last 30 days: No  Discharging Facility: [facility]  Date of Admission: 05/08/21  Date of Discharge: 05/12/21    Patient's perception of their health status since discharge: Improving    HPI:  Pt taken via 911 to [facility] with change in mental status. Pt was found to be lethargic and sleeping all the time.  Pt had pain medications discontinued which helped with mental status.  Pt also found to have some congestive heart failure and treated with Bumex.  Pt improved and was discharged to home with services.    Spoke with pt's wife today, pt is fabulous.  Pt's wife states that hydromorphone was contributing to sleepy state.  Pt up and walking around house with walker without difficulty.    Pt have bedroom/bathroom on one floor.  Wife sleeps on second floor.    Home care RN to visit in near future.    Review of Systems   Constitutional: Negative for appetite change.   Respiratory: Negative for shortness of breath.    Cardiovascular: Negative for chest pain.   Gastrointestinal: Negative for abdominal pain, diarrhea, nausea and vomiting.   Genitourinary: Negative for difficulty urinating.   Neurological: Negative for dizziness and light-headedness.       Medication Review:    Medication Review: Yes     Reported by: Patient  Any new medications prescribed at discharge?: Yes  Is the patient having any side effects they believe may be caused by any medication additions or changes?: No  New prescriptions filled?: Yes     Do you have enough of your regularly prescribed medications?: Yes     Medication adherence problem?: No  Was a medication discrepancy indentified?: No    Nursing Interventions: No intervention needed  Reconciled the current and discharge medications: Yes  Reviewed AVS (Discharge  Instructions)?: Yes   Acute Pain:    Acute pain: No   Chronic Pain:    Chronic pain: Yes  Limitation of routine activities due to chronic pain?: yes  Chronic pain severity:  (varies 5-9)  Chronic pain timing: intermittent  Location of chronic pain: knees and hands    Diet/Nutrition:    Type of Diet: Diabetic  Diet Adherence: Adherent with diet     Home Care Services:    Home Care Agency: [facility]  Type of Home Care Services: Home Nursing        Post-Discharge Durable Medical Equipment::    Durable Medical Equipment: Electric wheelchair, Front wheeled walker  Oxygen Use: No      Home Management:    Living Arrangement: Spouse  Support System:: Spouse  Type of Residence: 2 story house (everything on one floor)     Any patient reported falls in the last 3 months?: Yes  Mandaen or spiritual beliefs that impact treatment?: No    Appointment Scheduling:     Patient Scheduling Dispositions: Pt will call office to schedule     Follow-Ups:     Relevant Specialist Follow-ups: Wound Care 5/17/2021      Dr. Quintana To be scheduled    Interventions/ Care Coordination:    Interventions/ Care Coordination: Encouraged patient to call PCP/Specialist, Addressed a knowledge deficit  General Education: Respiratory precautions (flu, colds, pneumonia, COVID-19)       Reviewed signs/symptoms of worsening condition or complication that necessitate a call to the Physician's office.  Educated patient on access to care.  RN phone number given for future care management.    Paz Shepard RN BSN   [phone]

## 2021-05-18 ENCOUNTER — PATIENT OUTREACH (OUTPATIENT)
Dept: CASE MANAGEMENT | Facility: CLINIC | Age: 71
End: 2021-05-18

## 2021-05-18 ENCOUNTER — TELEPHONE (OUTPATIENT)
Dept: FAMILY MEDICINE | Facility: CLINIC | Age: 71
End: 2021-05-18

## 2021-05-18 NOTE — TELEPHONE ENCOUNTER
Teresita from [facility] is asking for an order for the tele-fartun program through [facility]. This would allow them to monitor the patient without going out to the home

## 2021-05-18 NOTE — PROGRESS NOTES
TCM f/u call. Spoke with wife who reported that he was found to be over medicated from the pain Dr. He is getting home care for RN, PT and OT. She reports he is doing much better like a different person. They have a telehealth with pain managment tomorrow.  She noted that he is ambulating more and they are following CHF home management. Home care nurse changed wound dressing today and wound is improving.  Wife appreciative of f/u call.

## 2021-07-21 RX ORDER — POTASSIUM CHLORIDE 1500 MG/1
TABLET, EXTENDED RELEASE ORAL
Qty: 180 TABLET | Refills: 1 | Status: SHIPPED | OUTPATIENT
Start: 2021-07-21 | End: 2021-12-13

## 2021-07-21 NOTE — TELEPHONE ENCOUNTER
Medicine Refill Request    Last Office Visit: 11/5/2019  Last Telemedicine Visit: Visit date not found    Next Office Visit: Visit date not found  Next Telemedicine Visit: Visit date not found         Current Outpatient Medications:   •  aluminum sulfate-calcium acetate (DOMEBORO) 952-1,347 mg packet, use 1 packet  for each leg twice daily, Disp: , Rfl:   •  ascorbic acid (VITAMIN C) 500 mg tablet, Take 1 tablet by mouth daily., Disp: , Rfl:   •  atorvastatin (LIPITOR) 20 mg tablet, TAKE 1 TABLET BY MOUTH EVERY DAY, Disp: 90 tablet, Rfl: 3  •  blood sugar diagnostic (CONTOUR NEXT STRIPS) strip, for blood sugar monitoring 3x day, Disp: , Rfl:   •  bran-gum-fib-gerard-psyl-kelp-pec (FIBER 6) 1,000 mg tablet, Take 2 capsules by mouth daily., Disp: , Rfl:   •  bumetanide (BUMEX) 1 mg tablet, TAKE 1 TABLET BY MOUTH 3  TIMES DAILY, Disp: 270 tablet, Rfl: 3  •  cetirizine (ZyrTEC) 10 mg tablet, Take 10 mg by mouth daily., Disp: , Rfl:   •  cholecalciferol, vitamin D3, (VITAMIN D3) 1,000 unit capsule, one capsule daily, Disp: , Rfl:   •  cyanocobalamin, vitamin B-12, (VITAMIN B-12 ORAL), Take 1 tablet by mouth daily., Disp: , Rfl:   •  diclofenac sodium (VOLTAREN) 1 % topical gel, 1 %., Disp: , Rfl:   •  ferrous sulfate 325 mg (65 mg iron) EC tablet, Take 1 tablet by mouth every other day.  , Disp: , Rfl:   •  gabapentin (NEURONTIN) 800 mg tablet, Take 300 mg by mouth 2 (two) times a day.  , Disp: , Rfl:   •  glimepiride (AMARYL) 4 mg tablet, Take one tablet by oral route in morning and 1/2 evening (Patient taking differently: 4 mg daily with breakfast. Take one tablet by oral route in morning and 1/2 evening ), Disp: 135 tablet, Rfl: 3  •  HYDROmorphone (EXALGO ER) 12 mg tablet extended release 24 hr, 12 mg., Disp: , Rfl:   •  insulin lispro protamin/lispro (HUMALOG MIX 75-25,U-100,INSULN SUBQ), Inject 10 Units under the skin 2 (two) times a day.  , Disp: , Rfl:   •  KLOR-CON M20 20 mEq CR tablet, TAKE 1 TABLET BY MOUTH TWICE  A DAY, Disp: 180 tablet, Rfl: 1  •  lancets (LANCETS, SUPER THIN) misc, for blood glucose monitoring 3x day, Disp: , Rfl:   •  liraglutide (VICTOZA 3-RUKHSANA) 0.6 mg/0.1 mL (18 mg/3 mL) injection, 0.6mg QD, Disp: , Rfl:   •  lisinopril (PRINIVIL) 2.5 mg tablet, Take 1 tablet (2.5 mg total) by mouth daily., Disp: 90 tablet, Rfl: 3  •  metFORMIN (GLUCOPHAGE) 850 mg tablet, Take 1 tablet (850 mg total) by mouth 2 (two) times a day with meals., Disp: 180 tablet, Rfl: 3  •  oxyCODONE (OXY-IR) 5 mg capsule, 5 mg 3 (three) times a day.  , Disp: , Rfl:   •  pantoprazole (PROTONIX) 40 mg EC tablet, TAKE 1 TABLET BY MOUTH EVERY DAY, Disp: 90 tablet, Rfl: 3  •  PEN NEEDLE, DIABETIC (BD ULTRA-FINE MICRO PEN NEEDLE MISC), once daily use with Levemir, Disp: , Rfl:   •  polycarbophil (FIBERCON) 625 mg tablet, Take 2 capsules by mouth daily., Disp: , Rfl:   •  polyethylene glycol (MIRALAX) 17 gram/dose powder, take (17G)  by oral route  every day mixed with 8 oz. water, juice, soda, coffee or tea, Disp: , Rfl:   •  rivaroxaban (XARELTO) 20 mg tablet, Take 1 tablet (20 mg total) by mouth daily with dinner., Disp: 90 tablet, Rfl: 3  •  sildenafil (VIAGRA) 100 mg tablet, , Disp: , Rfl: 0      BP Readings from Last 3 Encounters:   11/05/19 120/70   07/09/19 118/80   04/09/19 104/65       Recent Lab results:  Lab Results   Component Value Date    CHOL 137 06/13/2019   ,   Lab Results   Component Value Date    HDL 41 06/13/2019   ,   Lab Results   Component Value Date    LDLCALC 78 06/13/2019   ,   Lab Results   Component Value Date    TRIG 99 06/13/2019        Lab Results   Component Value Date    GLUCOSE 132 (H) 06/13/2019   ,   Lab Results   Component Value Date    HGBA1C 6.6 (H) 06/13/2019         Lab Results   Component Value Date    CREATININE 1.36 (H) 06/13/2019       Lab Results   Component Value Date    TSH 2.49 07/10/2018

## 2021-09-29 ENCOUNTER — CLINICAL SUPPORT (OUTPATIENT)
Dept: FAMILY MEDICINE | Facility: CLINIC | Age: 71
End: 2021-09-29
Payer: MEDICARE

## 2021-09-29 DIAGNOSIS — Z23 NEED FOR INFLUENZA VACCINATION: Primary | ICD-10-CM

## 2021-09-29 PROCEDURE — 90694 VACC AIIV4 NO PRSRV 0.5ML IM: CPT | Performed by: FAMILY MEDICINE

## 2021-09-29 PROCEDURE — G0008 ADMIN INFLUENZA VIRUS VAC: HCPCS | Performed by: FAMILY MEDICINE

## 2021-10-20 ENCOUNTER — TELEPHONE (OUTPATIENT)
Dept: FAMILY MEDICINE | Facility: CLINIC | Age: 71
End: 2021-10-20

## 2021-10-20 NOTE — TELEPHONE ENCOUNTER
Patient's wife left message asking for a letter stating -  Replacement battery for motorized wheelchair is a medical necessity, for continued use of power wheelchair.    Principal Discharge DX:	Rhabdomyolysis  Secondary Diagnosis:	Fall at home, initial encounter

## 2021-12-21 ENCOUNTER — TELEMEDICINE (OUTPATIENT)
Dept: FAMILY MEDICINE | Facility: CLINIC | Age: 71
End: 2021-12-21
Payer: MEDICARE

## 2021-12-21 DIAGNOSIS — R05.9 COUGH: Primary | ICD-10-CM

## 2021-12-21 PROCEDURE — 99213 OFFICE O/P EST LOW 20 MIN: CPT | Mod: 95 | Performed by: FAMILY MEDICINE

## 2021-12-21 RX ORDER — AZITHROMYCIN 250 MG/1
TABLET, FILM COATED ORAL
Qty: 6 TABLET | Refills: 0 | Status: SHIPPED | OUTPATIENT
Start: 2021-12-21 | End: 2021-12-26

## 2021-12-21 RX ORDER — BENZONATATE 100 MG/1
100 CAPSULE ORAL 3 TIMES DAILY PRN
Qty: 21 CAPSULE | Refills: 0 | Status: SHIPPED | OUTPATIENT
Start: 2021-12-21 | End: 2022-01-31 | Stop reason: ALTCHOICE

## 2021-12-21 ASSESSMENT — ENCOUNTER SYMPTOMS
FEVER: 0
SORE THROAT: 0
SHORTNESS OF BREATH: 1
CHILLS: 0
PALPITATIONS: 0
COUGH: 1

## 2021-12-21 NOTE — PROGRESS NOTES
Verification of Patient Location:  The patient affirms they are currently located in the following state: [place]    Request for Consent:    Audio and Video Encounter   [place], my name is Aziza DOWNING DO Agustín.  Before we proceed, can you please verify your identification by telling me your full name and date of birth?  Can you tell me who is in the room with you?    You and I are about to have a telemedicine check-in or visit because you have requested it.  This is a live video-conference.  I am a real person, speaking to you in real time.  There is no one else with me on the video-conference.  However, when we use ([employer], [employer], etc) it is important for you to know that the video-conference may not be secure or private.  I am not recording this conversation and I am asking you not to record it.  This telemedicine visit will be billed to your health insurance or you, if you are self-insured.  You understand you will be responsible for any copayments or coinsurances that apply to your telemedicine visit.  Communication platform used for this encounter:  MobilePro Video Visit (with Zoom integration)     Before starting our telemedicine visit, I am required to get your consent for this virtual check-in or visit by telemedicine. Do you consent?      Patient Response to Request for Consent:  Yes      Visit Documentation:  Subjective     Patient ID: Mitchell Sauceda is a 71 y.o. male.  1950      Patient's wife has pneumonia.  He has been fully vaccinated against Covid.    Cough  This is a new problem. The current episode started in the past 7 days. The problem has been unchanged. The cough is non-productive. Associated symptoms include nasal congestion, postnasal drip and shortness of breath. Pertinent negatives include no chest pain, chills, fever or sore throat. The symptoms are aggravated by lying down.       The following have been reviewed and updated as appropriate in this visit:   Tobacco  Allergies   Meds  Problems  Med Hx  Surg Hx  Fam Hx       Review of Systems   Constitutional: Negative for chills and fever.   HENT: Positive for congestion and postnasal drip. Negative for sore throat.    Respiratory: Positive for cough and shortness of breath.    Cardiovascular: Negative for chest pain and palpitations.         Assessment/Plan   Diagnoses and all orders for this visit:    Cough (Primary)  Assessment & Plan:  Cover for pneumonia(exposed)  Prescribed Zpack  Prescribed Tessalon Perles 100mg 3x day as needed  Call if symptoms persist or worsen      Other orders  -     azithromycin 250 mg tablet; Take 2 tablets the first day, then 1 tablet daily for 4 days.  -     benzonatate (TESSALON PERLES) 100 mg capsule; Take 1 capsule (100 mg total) by mouth 3 (three) times a day as needed for cough.      Time Spent:  I spent 7 minutes on this date of service performing the following activities: obtaining history, entering orders, documenting, obtaining / reviewing records and providing counseling and education.

## 2021-12-21 NOTE — ASSESSMENT & PLAN NOTE
Cover for pneumonia(exposed)  Prescribed Zpack  Prescribed Tessalon Perles 100mg 3x day as needed  Call if symptoms persist or worsen

## 2022-01-31 ENCOUNTER — OFFICE VISIT (OUTPATIENT)
Dept: FAMILY MEDICINE | Facility: CLINIC | Age: 72
End: 2022-01-31
Payer: MEDICARE

## 2022-01-31 VITALS
SYSTOLIC BLOOD PRESSURE: 138 MMHG | HEART RATE: 88 BPM | OXYGEN SATURATION: 94 % | TEMPERATURE: 97.6 F | DIASTOLIC BLOOD PRESSURE: 72 MMHG | WEIGHT: 315 LBS | BODY MASS INDEX: 41.8 KG/M2 | RESPIRATION RATE: 18 BRPM

## 2022-01-31 DIAGNOSIS — E13.40 OTHER SPECIFIED DIABETES MELLITUS WITH DIABETIC NEUROPATHY, UNSPECIFIED (CMS/HCC): ICD-10-CM

## 2022-01-31 DIAGNOSIS — H61.21 IMPACTED CERUMEN OF RIGHT EAR: ICD-10-CM

## 2022-01-31 DIAGNOSIS — Z00.00 INITIAL MEDICARE ANNUAL WELLNESS VISIT: Primary | ICD-10-CM

## 2022-01-31 DIAGNOSIS — E66.01 MORBID (SEVERE) OBESITY DUE TO EXCESS CALORIES (CMS/HCC): ICD-10-CM

## 2022-01-31 DIAGNOSIS — R35.0 URINARY FREQUENCY: ICD-10-CM

## 2022-01-31 DIAGNOSIS — I48.19 PERSISTENT ATRIAL FIBRILLATION (CMS/HCC): ICD-10-CM

## 2022-01-31 PROBLEM — R05.9 COUGH: Status: RESOLVED | Noted: 2021-12-21 | Resolved: 2022-01-31

## 2022-01-31 PROCEDURE — 69210 REMOVE IMPACTED EAR WAX UNI: CPT | Performed by: FAMILY MEDICINE

## 2022-01-31 PROCEDURE — 36415 COLL VENOUS BLD VENIPUNCTURE: CPT | Performed by: FAMILY MEDICINE

## 2022-01-31 PROCEDURE — G0438 PPPS, INITIAL VISIT: HCPCS | Performed by: FAMILY MEDICINE

## 2022-01-31 RX ORDER — INSULIN LISPRO 100 [IU]/ML
INJECTION, SOLUTION INTRAVENOUS; SUBCUTANEOUS
COMMUNITY
End: 2022-01-31 | Stop reason: ALTCHOICE

## 2022-01-31 ASSESSMENT — ENCOUNTER SYMPTOMS
SHORTNESS OF BREATH: 0
ABDOMINAL PAIN: 0
DIZZINESS: 0
ARTHRALGIAS: 1
COUGH: 0
HEADACHES: 0
BRUISES/BLEEDS EASILY: 0
SLEEP DISTURBANCE: 0
CONSTIPATION: 1
PALPITATIONS: 0
LIGHT-HEADEDNESS: 0

## 2022-01-31 ASSESSMENT — PATIENT HEALTH QUESTIONNAIRE - PHQ9: SUM OF ALL RESPONSES TO PHQ9 QUESTIONS 1 & 2: 0

## 2022-01-31 ASSESSMENT — ACTIVITIES OF DAILY LIVING (ADL)
ADEQUATE_TO_COMPLETE_ADL: YES
ASSISTIVE_DEVICE: WHEELCHAIR;WALKER
PATIENT'S MEMORY ADEQUATE TO SAFELY COMPLETE DAILY ACTIVITIES?: YES

## 2022-01-31 NOTE — PROGRESS NOTES
Subjective     Mitchell Sauceda is a 71 y.o. male who presents for an initial annual wellness visit.     Patient Care Team:  Aziza Randolph DO as PCP - General  Haleigh Grimaldo CM as Care Coordinator ()  Marilyn Gamboa, PT as Physical Therapist (Physical Therapy)  Kiko Ernst MD as Consulting Physician (Ophthalmology)  Manjeet Rivas DPM as Referring Physician (Podiatry)    Comprehensive Medical and Social History  Patient Active Problem List   Diagnosis   • Neuropathy   • Mixed hyperlipidemia   • Hiatal hernia   • Gastroesophageal reflux disease   • Edema   • Diabetic retinopathy (CMS/[facility])   • Type 2 diabetes mellitus with hypoglycemia (CMS/[facility])   • Cataracts, bilateral   • Benign essential hypertension   • Chronic fatigue   • Other specified diabetes mellitus with diabetic neuropathy, unspecified (CMS/[facility])   • Anemia   • Chronic pain   • Morbid obesity (CMS/[facility])   • Sleep apnea   • Persistent atrial fibrillation (CMS/[facility])   • Ambulatory dysfunction   • Primary osteoarthritis involving multiple joints   • Elevated serum creatinine   • Cardiac pacemaker   • Chronic edema   • Chronotropic incompetence with sinus node dysfunction (CMS/[facility])   • Family history of hypercoagulable state   • History of DVT (deep vein thrombosis)   • History of pulmonary embolism   • Initial Medicare annual wellness visit   • Impacted cerumen of right ear     Past Medical History:   Diagnosis Date   • Arthritis    • GERD (gastroesophageal reflux disease)    • Hypertension    • Lipid disorder    • Neuropathy    • Persistent atrial fibrillation (CMS/[facility]) 3/19/2019   • Primary osteoarthritis involving multiple joints 5/15/2019   • Pulmonary embolism (CMS/[facility])    • Type 2 diabetes mellitus (CMS/[facility])    • Type 2 diabetes mellitus with hypoglycemia (CMS/[facility]) 10/24/2013    Overview:      Past Surgical History:   Procedure Laterality Date   • KNEE ARTHROSCOPY Bilateral    • VEIN SURGERY Right      Allergies    Allergen Reactions   • Vancomycin    • Prednisone Hives     Other reaction(s): rash/hives     Current Outpatient Medications   Medication Sig Dispense Refill   • ascorbic acid (VITAMIN C) 500 mg tablet Take 1 tablet by mouth daily.     • atorvastatin (LIPITOR) 20 mg tablet TAKE 1 TABLET BY MOUTH EVERY DAY 90 tablet 3   • blood sugar diagnostic (CONTOUR NEXT STRIPS) strip for blood sugar monitoring 3x day     • bran-gum-fib-gerard-psyl-kelp-pec (FIBER 6) 1,000 mg tablet Take 2 capsules by mouth daily.     • bumetanide (BUMEX) 1 mg tablet TAKE 1 TABLET BY MOUTH 3  TIMES DAILY 270 tablet 3   • cetirizine (ZyrTEC) 10 mg tablet Take 10 mg by mouth daily.     • cholecalciferol, vitamin D3, (VITAMIN D3) 1,000 unit capsule one capsule daily     • cyanocobalamin, vitamin B-12, (VITAMIN B-12 ORAL) Take 1 tablet by mouth daily.     • diclofenac sodium (VOLTAREN) 1 % topical gel 1 %.     • ferrous sulfate 325 mg (65 mg iron) EC tablet Take 1 tablet by mouth every other day.       • gabapentin (NEURONTIN) 800 mg tablet Take 300 mg by mouth 2 (two) times a day.       • glimepiride (AMARYL) 4 mg tablet Take one tablet by oral route in morning and 1/2 evening (Patient taking differently: 4 mg daily with breakfast. Take one tablet by oral route in morning and 1/2 evening ) 135 tablet 3   • insulin lispro protamin/lispro (HUMALOG MIX 75-25,U-100,INSULN SUBQ) Inject 10 Units under the skin 2 (two) times a day.       • KLOR-CON M20 20 mEq CR tablet TAKE 1 TABLET BY MOUTH TWICE A  tablet 0   • lancets (LANCETS, SUPER THIN) misc for blood glucose monitoring 3x day     • liraglutide (VICTOZA 3-RUKHSANA) 0.6 mg/0.1 mL (18 mg/3 mL) injection 0.6mg QD     • lisinopril (PRINIVIL) 2.5 mg tablet Take 1 tablet (2.5 mg total) by mouth daily. 90 tablet 3   • metFORMIN (GLUCOPHAGE) 850 mg tablet Take 1 tablet (850 mg total) by mouth 2 (two) times a day with meals. 180 tablet 3   • oxyCODONE (OXY-IR) 5 mg capsule 5 mg 3 (three) times a day.       •  pantoprazole (PROTONIX) 40 mg EC tablet TAKE 1 TABLET BY MOUTH EVERY DAY 90 tablet 3   • PEN NEEDLE, DIABETIC (BD ULTRA-FINE MICRO PEN NEEDLE MISC) once daily use with Levemir     • polycarbophil (FIBERCON) 625 mg tablet Take 2 capsules by mouth daily.     • polyethylene glycol (MIRALAX) 17 gram/dose powder take (17G)  by oral route  every day mixed with 8 oz. water, juice, soda, coffee or tea     • rivaroxaban (XARELTO) 20 mg tablet Take 1 tablet (20 mg total) by mouth daily with dinner. 90 tablet 3   • sildenafil (VIAGRA) 100 mg tablet   0     No current facility-administered medications for this visit.     Social History     Tobacco Use   • Smoking status: Never Smoker   • Smokeless tobacco: Never Used   Substance Use Topics   • Alcohol use: No   • Drug use: Not on file     Family History   Problem Relation Age of Onset   • Factor V Leiden deficiency Biological Sister    • Factor V Leiden deficiency Nephew        Objective   Vitals  Vitals:    01/31/22 1319   BP: 138/72   Pulse: 88   Resp: 18   Temp: 36.4 °C (97.6 °F)   SpO2: 94%   Weight: (!) 144 kg (316 lb 12.8 oz)     Body mass index is 41.8 kg/m².    Advanced Care Plan  Does patient have advance directive?: Yes       Patient has Advance Directive: Advance Directive in physical chart                             PHQ  Will the patient answer the depression questions?: Yes   Little interest or pleasure in doing things: Not at all   Feeling down, depressed, or hopeless: Not at all   Depression Risk: 0                                             Mini Cog   2/2 clock test  3/3 word recall      Get Up and Go  Result: Fail    STEADI Falls Risk  One or more falls in the last year: Yes   How many times: 2 or more   Was the patient injured in any fall: No   Has trouble stepping up onto a curb: Yes   Advised to use a cane or walker to get around safely: Yes   Often has to rush to the toilet: No   Feels unsteady when walking: Yes   Has lost some feeling in feet: Yes    Often feels sad or depressed: No   Steadies self on furniture while walking at home: Yes   Takes medication that makes him/her feel lightheaded or more tired than usual: Yes   Worried about falling: Yes   Takes medicine to sleep or improve mood: Yes   Needs to push with hands when rising from a chair: Yes   Falls screen completed: No     Hearing and Vision Screening  No exam data present  See HRA for relevant hearing screening response.    Assessment/Plan   Diagnoses and all orders for this visit:    Initial Medicare annual wellness visit (Primary)  Assessment & Plan:  70 yo male presents for annual wellness visit  Forms completed, reviewed and scanned      Other specified diabetes mellitus with diabetic neuropathy, unspecified (CMS/HCC)  Assessment & Plan:  Stable  Check blood work today    Orders:  -     CBC and Differential  -     Comprehensive metabolic panel  -     Hemoglobin A1c    Persistent atrial fibrillation (CMS/[facility])  Assessment & Plan:  Stable  Continue xarelto      Morbid (severe) obesity due to excess calories (CMS/[facility])    Urinary frequency  -     PSA    Impacted cerumen of right ear  -     Ear cerumen removal      See Patient Instructions (the written plan) which was given to the patient for PPPS and health risk factors with interventions.         Subjective      Patient ID: Mitchell Sauceda is a 71 y.o. male.  1950      Patient presents for annual wellness visit.  His wife is present during the exam.  Overall, he is feeling okay.  He is being treated for chronic pain by pain specialist.  He does get occasional constipation from the medication.  He denies any chest pain or shortness of breath.  No headaches or dizziness.  He uses a walker to ambulate.      The following have been reviewed and updated as appropriate in this visit:   Tobacco  Allergies  Meds  Problems  Med Hx  Surg Hx  Fam Hx       Review of Systems   Constitutional: Negative for chills and fever.   Respiratory: Negative  for cough and shortness of breath.    Cardiovascular: Negative for chest pain and palpitations.   Gastrointestinal: Positive for constipation. Negative for abdominal pain.   Musculoskeletal: Positive for arthralgias.   Neurological: Negative for dizziness, light-headedness and headaches.   Hematological: Does not bruise/bleed easily.   Psychiatric/Behavioral: Negative for sleep disturbance.       Objective     Vitals:    01/31/22 1319   BP: 138/72   Pulse: 88   Resp: 18   Temp: 36.4 °C (97.6 °F)   SpO2: 94%   Weight: (!) 144 kg (316 lb 12.8 oz)     Body mass index is 41.8 kg/m².    Physical Exam  Vitals reviewed.   Constitutional:       Appearance: Normal appearance. He is obese.   HENT:      Right Ear: There is impacted cerumen.      Left Ear: Tympanic membrane, ear canal and external ear normal.      Mouth/Throat:      Mouth: Mucous membranes are moist.      Pharynx: Oropharynx is clear.   Eyes:      Pupils: Pupils are equal, round, and reactive to light.   Cardiovascular:      Rate and Rhythm: Normal rate and regular rhythm.      Heart sounds: Normal heart sounds. No murmur heard.  Pulmonary:      Effort: Pulmonary effort is normal. No respiratory distress.      Breath sounds: Normal breath sounds. No wheezing or rales.   Abdominal:      General: Bowel sounds are normal. There is no distension.      Palpations: Abdomen is soft.      Tenderness: There is no abdominal tenderness.   Musculoskeletal:      Cervical back: Neck supple.   Lymphadenopathy:      Cervical: No cervical adenopathy.   Skin:     General: Skin is warm and dry.   Neurological:      General: No focal deficit present.      Mental Status: He is alert.   Psychiatric:         Mood and Affect: Mood normal.         Assessment/Plan   Diagnoses and all orders for this visit:    Initial Medicare annual wellness visit (Primary)  Assessment & Plan:  70 yo male presents for annual wellness visit  Forms completed, reviewed and scanned      Other specified  diabetes mellitus with diabetic neuropathy, unspecified (CMS/HCC)  Assessment & Plan:  Stable  Check blood work today    Orders:  -     CBC and Differential  -     Comprehensive metabolic panel  -     Hemoglobin A1c    Persistent atrial fibrillation (CMS/HCC)  Assessment & Plan:  Stable  Continue xarelto      Morbid (severe) obesity due to excess calories (CMS/HCC)    Urinary frequency  -     PSA    Impacted cerumen of right ear  -     Ear cerumen removal

## 2022-01-31 NOTE — PATIENT INSTRUCTIONS
Your Personalized Prevention Plan Services (PPPS)    Preventive Services Checklist (Assumes Average Risk Unless Otherwise Noted):    Health Maintenance Topics with due status: Overdue       Topic Date Due    COVID-19 Vaccine Never done    Medicare Annual Wellness Visit Never done    Hepatitis C Screening Never done    DTaP, Tdap, and Td Vaccines Never done    Zoster Vaccine Never done    Colonoscopy Never done    Annual Dilated Retinal Exam 10/12/2019    Pneumococcal 01/15/2020    Pneumococcal (65 years and older) 01/15/2020    Hemoglobin A1C 06/13/2020    Urine Protein Screening 06/13/2020    Annual Falls Risk Screening Never done     Health Maintenance Topics with due status: Not Due       Topic Last Completion Date    Diabetic Foot Exam 05/27/2021     Health Maintenance Topics with due status: Completed       Topic Last Completion Date    Influenza Vaccine 09/29/2021     Health Maintenance Topics with due status: Aged Out       Topic Date Due    Meningococcal ACWY Aged Out    HIB Vaccines Aged Out    IPV Vaccines Aged Out    HPV Vaccines Aged Out       You May Be Eligible for These Additional Preventive Services   (Assumes Average Risk Unless Otherwise Noted)  Diabetes Screening Any 1 risk factor: hypertension, dyslipidemia, obesity, high glucose; or Any 2 risk factors: >=64yo, overweight, family history diabetes (covered every 6 months)   Hepatitis C Screening Any 1 risk factor: 1) blood transfusion before 1992,   2) current or past injection drug use (annually for high risk; if born between 5212-0826, see above for status).   Vaccine: Hepatitis B As necessary if at-risk: hemophilia, ESRD, diabetes, living with individual infected with hep B, healthcare worker with frequent contact with blood/bodily fluids (series covered once)   Sexually Transmitted Diseases (STDs) As necessary chlamydia, gonorrhea, syphilis, hepatitis B (covered annually)  HIV if any 1 risk factor present: 1) <16yo or  >64yo and at increased risk or 2) 15-64yo and ask for it (covered annually)   Lung Cancer Screening Low dose chest CT if all 3 risk factors: 1) 55-76yo, 2) smoker or quit within last 15y, 3) >=30 pack years (covered annually).  No results found for this or any previous visit.       Cholesterol Screening No signs or symptoms of cardiovascular disease (screening covered every 5 years).     Prostate Cancer Screening >= 49yo if patient desires test after weighting potential harms and benefits (covered annually)     Glaucoma Screening Any 1 risk factor: 1) diabetes, 2) family history glaucoma, 3)  >=49yo, 4)  American >=64yo (covered annually)           Health Risk Factors with Personalized Education:  ----------------------------------------------------------------------------------------------------------------------  Controlling Your Blood Pressure  · Maintain a normal weight (body mass index between 18.5 and 24.9).  · Eat more fruit, vegetables and low-fat dairy.  · Eat less saturated fat and total fat.  · Lower your sodium (salt) intake.  Try to stay under 1500 mg per day, but if you cannot get your intake to be that low, at least lower it by 1000 mg.  · Stay active.  Try to get at least 90 to 150 minutes of exercise per week.  Try brisk walking, swimming, bicycling or dancing.  · Limit alcohol intake.  When you do consume alcohol, drink no more than 2 drinks per day.  · If you have been prescribed medication, take it regularly and exactly as prescribed.  Let your PCP know if you have any problems or questions about your medication.  · Check your blood pressure at home or at the store.  Write down your readings and share them with your PCP  ----------------------------------------------------------------------------------------------------------------------  Controlling Your Diabetes  · Stress can raise your blood sugar.  Learn what causes your stress.  Learn to lower your stress by spending  time with family and friends, exercising, deep breathing, trying meditation or yoga, enjoying hobbies and getting enough rest.  Let your PCP know if you’re having problems limiting your stress.  · Maintain a healthy weight by balancing your diet and exercise.  · Choose foods that are lower in calories, saturated fat, trans fat, sugar and salt.  Eat foods with more fiber, like whole grain cereals, bread, crackers, rice or pasta.  Choose to eat fruit, vegetables, and low-fat or fat-free/skim dairy.  · Reduce the portion size of your meals.  Make half of your meal vegetables and fruit, and divide the other half between lean protein and whole grains.  · Drink water instead of juice and regular soda.  · Spend at least some time being active on most days of the week.  · Work to increase your muscle strength at least twice per week.  Try things like light weights, stretch bands, yoga, heavy gardening (digging, planting with tools) or push-ups.  · If you have been prescribed medication, take it regularly and exactly as prescribed.  Let your PCP know if you have any problems or questions about your medication.  · If you have been asked to check your blood sugar, write down your readings and share them with your PCP  ----------------------------------------------------------------------------------------------------------------------  Controlling Your Cholesterol  · Reduce the amount of saturated and trans fat in your diet.  Limit intake of red meat.  Consume only low-fat or non-fat/skim dairy.  Limit fried food.  Cook with vegetable oils.  · Reduce your intake of sugary foods, sugary drinks and alcohol.  · Eat a diet high in fruit, vegetables and whole grains.  · Get protein from fish, poultry and a small portion of nuts.  · Stay active.  Try to get at least 90 to 150 minutes of exercise per week.  Try brisk walking, swimming, bicycling or dancing.  · Maintain a healthy weight by balancing your diet and exercise.  · If you  have been prescribed medication, take it regularly and exactly as prescribed. Let your PCP know if you have any problems or questions about your medication.  · It’s important to know your cholesterol numbers.  When recommended by your PCP, get the cholesterol blood test.  ----------------------------------------------------------------------------------------------------------------------  Reducing Your Risk of Falls  · Tell your PCP if any of your medications make you feel tired, dizzy, lightheaded or off-balance.  · Maintain coordination, flexibility and balance by ensuring regular physical activity.  · Limit alcohol intake to 1 drink per day.  Consider avoiding all alcohol intake.  · Ensure good vision.  Visit an ophthalmologist or optometrist regularly for vision screening or to make sure your glasses / contact lens prescription is correct.  If you need glasses or contacts, wear them.  When you get new glasses or contacts, take time to get used to them.  Do not wear sunglasses or tinted lenses when indoors.  · Ensure good hearing.  Have your hearing checked if you are having trouble hearing, or family and friends think you cannot hear them.  If you need a hearing aid, be sure it fits well and wear it.  · Get enough rest.  Ensure about 7-9 hours of sleep every day.  · Get up slowly from your bed or chairs.  Do not start walking until you are sure you feel steady.  · Wear non-skid, rubber-soled, low-heeled shoes.  Do not walk in socks, or in shoes and slippers with smooth soles.  · If your PCP or therapist recommends using a cane or walker, use it regularly.  · Make your home safer.  Increase lighting throughout the house, especially at the top and bottom of stairs.  Ensure lighting is easily turned on when getting up in the middle of the night.  Make sure there are two secure rails on all stairs.  Install grab bars in the bathtub / shower and near the toilet.  Consider using a shower chair and / or a hand-held  shower.  · Spread sand or salt on icy surfaces.  Beware of wet surfaces, which can be icy.  · Tell your PCP if you have fallen.

## 2022-02-01 PROBLEM — Z00.00 INITIAL MEDICARE ANNUAL WELLNESS VISIT: Status: ACTIVE | Noted: 2022-02-01

## 2022-02-01 PROBLEM — R53.83 FATIGUE: Status: RESOLVED | Noted: 2018-08-22 | Resolved: 2022-02-01

## 2022-02-01 PROBLEM — H61.21 IMPACTED CERUMEN OF RIGHT EAR: Status: ACTIVE | Noted: 2022-02-01

## 2022-02-01 PROBLEM — I87.331 CHRONIC VENOUS HYPERTENSION WITH ULCER AND INFLAMMATION INVOLVING RIGHT SIDE (CMS/HCC): Status: RESOLVED | Noted: 2018-11-08 | Resolved: 2022-02-01

## 2022-02-01 LAB
ALBUMIN SERPL-MCNC: 3.9 G/DL (ref 3.6–5.1)
ALBUMIN/GLOB SERPL: 1.1 (CALC) (ref 1–2.5)
ALP SERPL-CCNC: 93 U/L (ref 35–144)
ALT SERPL-CCNC: 19 U/L (ref 9–46)
AST SERPL-CCNC: 21 U/L (ref 10–35)
BASOPHILS # BLD AUTO: 21 CELLS/UL (ref 0–200)
BASOPHILS NFR BLD AUTO: 0.5 %
BILIRUB SERPL-MCNC: 0.8 MG/DL (ref 0.2–1.2)
BUN SERPL-MCNC: 17 MG/DL (ref 7–25)
BUN/CREAT SERPL: 13 (CALC) (ref 6–22)
CALCIUM SERPL-MCNC: 9.6 MG/DL (ref 8.6–10.3)
CHLORIDE SERPL-SCNC: 97 MMOL/L (ref 98–110)
CO2 SERPL-SCNC: 28 MMOL/L (ref 20–32)
CREAT SERPL-MCNC: 1.26 MG/DL (ref 0.7–1.18)
EOSINOPHIL # BLD AUTO: 70 CELLS/UL (ref 15–500)
EOSINOPHIL NFR BLD AUTO: 1.7 %
ERYTHROCYTE [DISTWIDTH] IN BLOOD BY AUTOMATED COUNT: 12.5 % (ref 11–15)
GLOBULIN SER CALC-MCNC: 3.5 G/DL (CALC) (ref 1.9–3.7)
GLUCOSE SERPL-MCNC: 387 MG/DL (ref 65–99)
HBA1C MFR BLD: 11.3 % OF TOTAL HGB
HCT VFR BLD AUTO: 44.2 % (ref 38.5–50)
HGB BLD-MCNC: 14.7 G/DL (ref 13.2–17.1)
LYMPHOCYTES # BLD AUTO: 513 CELLS/UL (ref 850–3900)
LYMPHOCYTES NFR BLD AUTO: 12.5 %
MCH RBC QN AUTO: 30.4 PG (ref 27–33)
MCHC RBC AUTO-ENTMCNC: 33.3 G/DL (ref 32–36)
MCV RBC AUTO: 91.5 FL (ref 80–100)
MONOCYTES # BLD AUTO: 373 CELLS/UL (ref 200–950)
MONOCYTES NFR BLD AUTO: 9.1 %
NEUTROPHILS # BLD AUTO: 3124 CELLS/UL (ref 1500–7800)
NEUTROPHILS NFR BLD AUTO: 76.2 %
PLATELET # BLD AUTO: 167 THOUSAND/UL (ref 140–400)
PMV BLD REES-ECKER: 11.7 FL (ref 7.5–12.5)
POTASSIUM SERPL-SCNC: 4.8 MMOL/L (ref 3.5–5.3)
PROT SERPL-MCNC: 7.4 G/DL (ref 6.1–8.1)
PSA SERPL-MCNC: 0.58 NG/ML
QUEST EGFR AFRICAN AMERICAN: 65 ML/MIN/1.73M2
QUEST EGFR NON-AFR. AMERICAN: 56 ML/MIN/1.73M2
RBC # BLD AUTO: 4.83 MILLION/UL (ref 4.2–5.8)
SODIUM SERPL-SCNC: 134 MMOL/L (ref 135–146)
WBC # BLD AUTO: 4.1 THOUSAND/UL (ref 3.8–10.8)

## 2022-02-01 ASSESSMENT — ENCOUNTER SYMPTOMS
CHILLS: 0
FEVER: 0

## 2022-02-01 NOTE — PROCEDURES
Ear cerumen removal    Date/Time: 2/1/2022 2:51 PM  Performed by: Aziza Randolph DO  Authorized by: Aziza Randolph DO     Consent:     Consent obtained:  Verbal    Consent given by:  Patient    Risks discussed:  Bleeding, dizziness, incomplete removal and pain    Alternatives discussed:  No treatment  Procedure details:     Location:  R ear    Procedure type: curette    Post-procedure details:     Inspection:  TM intact    Hearing quality:  Improved    Patient tolerance of procedure:  Tolerated well, no immediate complications

## 2022-02-01 NOTE — RESULT ENCOUNTER NOTE
Notify patient's wife blood count is normal.  Liver function tests are normal.  Creatinine slightly increased and sodium was one-point below normal.  His hemoglobin A1c is very elevated at 11.3 so you must follow-up with the endocrinologist.  PSA is normal.

## 2022-03-22 RX ORDER — POTASSIUM CHLORIDE 1500 MG/1
TABLET, EXTENDED RELEASE ORAL
Qty: 180 TABLET | Refills: 0 | Status: SHIPPED | OUTPATIENT
Start: 2022-03-22 | End: 2022-06-25

## 2022-03-22 NOTE — TELEPHONE ENCOUNTER
Medicine Refill Request    Last Office: 1/31/2022   Last Consult Visit: Visit date not found  Last Telemedicine Visit: 12/21/2021 Aziza Randolph,     Next Appointment: Visit date not found      Current Outpatient Medications:   •  ascorbic acid (VITAMIN C) 500 mg tablet, Take 1 tablet by mouth daily., Disp: , Rfl:   •  atorvastatin (LIPITOR) 20 mg tablet, TAKE 1 TABLET BY MOUTH EVERY DAY, Disp: 90 tablet, Rfl: 3  •  blood sugar diagnostic (CONTOUR NEXT STRIPS) strip, for blood sugar monitoring 3x day, Disp: , Rfl:   •  bran-gum-fib-gerard-psyl-kelp-pec (FIBER 6) 1,000 mg tablet, Take 2 capsules by mouth daily., Disp: , Rfl:   •  bumetanide (BUMEX) 1 mg tablet, TAKE 1 TABLET BY MOUTH 3  TIMES DAILY, Disp: 270 tablet, Rfl: 3  •  cetirizine (ZyrTEC) 10 mg tablet, Take 10 mg by mouth daily., Disp: , Rfl:   •  cholecalciferol, vitamin D3, (VITAMIN D3) 1,000 unit capsule, one capsule daily, Disp: , Rfl:   •  cyanocobalamin, vitamin B-12, (VITAMIN B-12 ORAL), Take 1 tablet by mouth daily., Disp: , Rfl:   •  diclofenac sodium (VOLTAREN) 1 % topical gel, 1 %., Disp: , Rfl:   •  ferrous sulfate 325 mg (65 mg iron) EC tablet, Take 1 tablet by mouth every other day.  , Disp: , Rfl:   •  gabapentin (NEURONTIN) 800 mg tablet, Take 300 mg by mouth 2 (two) times a day.  , Disp: , Rfl:   •  glimepiride (AMARYL) 4 mg tablet, Take one tablet by oral route in morning and 1/2 evening (Patient taking differently: 4 mg daily with breakfast. Take one tablet by oral route in morning and 1/2 evening ), Disp: 135 tablet, Rfl: 3  •  insulin lispro protamin/lispro (HUMALOG MIX 75-25,U-100,INSULN SUBQ), Inject 10 Units under the skin 2 (two) times a day.  , Disp: , Rfl:   •  KLOR-CON M20 20 mEq CR tablet, TAKE 1 TABLET BY MOUTH TWICE A DAY, Disp: 180 tablet, Rfl: 0  •  lancets (LANCETS, SUPER THIN) misc, for blood glucose monitoring 3x day, Disp: , Rfl:   •  liraglutide (VICTOZA 3-RUKHSANA) 0.6 mg/0.1 mL (18 mg/3 mL) injection, 0.6mg QD, Disp: ,  Rfl:   •  lisinopril (PRINIVIL) 2.5 mg tablet, Take 1 tablet (2.5 mg total) by mouth daily., Disp: 90 tablet, Rfl: 3  •  metFORMIN (GLUCOPHAGE) 850 mg tablet, Take 1 tablet (850 mg total) by mouth 2 (two) times a day with meals., Disp: 180 tablet, Rfl: 3  •  oxyCODONE (OXY-IR) 5 mg capsule, 5 mg 3 (three) times a day.  , Disp: , Rfl:   •  pantoprazole (PROTONIX) 40 mg EC tablet, TAKE 1 TABLET BY MOUTH EVERY DAY, Disp: 90 tablet, Rfl: 3  •  PEN NEEDLE, DIABETIC (BD ULTRA-FINE MICRO PEN NEEDLE MISC), once daily use with Levemir, Disp: , Rfl:   •  polycarbophil (FIBERCON) 625 mg tablet, Take 2 capsules by mouth daily., Disp: , Rfl:   •  polyethylene glycol (MIRALAX) 17 gram/dose powder, take (17G)  by oral route  every day mixed with 8 oz. water, juice, soda, coffee or tea, Disp: , Rfl:   •  rivaroxaban (XARELTO) 20 mg tablet, Take 1 tablet (20 mg total) by mouth daily with dinner., Disp: 90 tablet, Rfl: 3  •  sildenafil (VIAGRA) 100 mg tablet, , Disp: , Rfl: 0      BP Readings from Last 3 Encounters:   01/31/22 138/72   11/05/19 120/70   07/09/19 118/80       Recent Lab results:  Lab Results   Component Value Date    CHOL 137 06/13/2019   ,   Lab Results   Component Value Date    HDL 41 06/13/2019   ,   Lab Results   Component Value Date    LDLCALC 78 06/13/2019   ,   Lab Results   Component Value Date    TRIG 99 06/13/2019        Lab Results   Component Value Date    GLUCOSE 387 (H) 01/31/2022   ,   Lab Results   Component Value Date    HGBA1C 11.3 (H) 01/31/2022         Lab Results   Component Value Date    CREATININE 1.26 (H) 01/31/2022       Lab Results   Component Value Date    TSH 2.49 07/10/2018

## 2022-04-08 RX ORDER — ATORVASTATIN CALCIUM 20 MG/1
TABLET, FILM COATED ORAL
Qty: 90 TABLET | Refills: 3 | Status: SHIPPED | OUTPATIENT
Start: 2022-04-08 | End: 2023-03-29 | Stop reason: SDUPTHER

## 2022-04-15 RX ORDER — PANTOPRAZOLE SODIUM 40 MG/1
TABLET, DELAYED RELEASE ORAL
Qty: 90 TABLET | Refills: 3 | Status: SHIPPED | OUTPATIENT
Start: 2022-04-15 | End: 2023-04-10 | Stop reason: SDUPTHER

## 2022-06-25 RX ORDER — POTASSIUM CHLORIDE 1500 MG/1
TABLET, EXTENDED RELEASE ORAL
Qty: 60 TABLET | Refills: 0 | Status: SHIPPED | OUTPATIENT
Start: 2022-06-25 | End: 2022-07-25 | Stop reason: SDUPTHER

## 2022-07-25 RX ORDER — POTASSIUM CHLORIDE 20 MEQ/1
20 TABLET, EXTENDED RELEASE ORAL
Qty: 180 TABLET | Refills: 0 | Status: SHIPPED | OUTPATIENT
Start: 2022-07-25 | End: 2022-10-31

## 2022-07-25 NOTE — TELEPHONE ENCOUNTER
Medicine Refill Request    Last Office: 1/31/2022   Last Consult Visit: Visit date not found  Last Telemedicine Visit: 12/21/2021 Aziza Randolph,     Next Appointment: Visit date not found      Current Outpatient Medications:   •  ascorbic acid (VITAMIN C) 500 mg tablet, Take 1 tablet by mouth daily., Disp: , Rfl:   •  atorvastatin (LIPITOR) 20 mg tablet, TAKE 1 TABLET BY MOUTH EVERY DAY, Disp: 90 tablet, Rfl: 3  •  blood sugar diagnostic (CONTOUR NEXT STRIPS) strip, for blood sugar monitoring 3x day, Disp: , Rfl:   •  bran-gum-fib-gerard-psyl-kelp-pec (FIBER 6) 1,000 mg tablet, Take 2 capsules by mouth daily., Disp: , Rfl:   •  bumetanide (BUMEX) 1 mg tablet, TAKE 1 TABLET BY MOUTH 3  TIMES DAILY, Disp: 270 tablet, Rfl: 3  •  cetirizine (ZyrTEC) 10 mg tablet, Take 10 mg by mouth daily., Disp: , Rfl:   •  cholecalciferol, vitamin D3, (VITAMIN D3) 1,000 unit capsule, one capsule daily, Disp: , Rfl:   •  cyanocobalamin, vitamin B-12, (VITAMIN B-12 ORAL), Take 1 tablet by mouth daily., Disp: , Rfl:   •  diclofenac sodium (VOLTAREN) 1 % topical gel, 1 %., Disp: , Rfl:   •  ferrous sulfate 325 mg (65 mg iron) EC tablet, Take 1 tablet by mouth every other day.  , Disp: , Rfl:   •  gabapentin (NEURONTIN) 800 mg tablet, Take 300 mg by mouth 2 (two) times a day.  , Disp: , Rfl:   •  glimepiride (AMARYL) 4 mg tablet, Take one tablet by oral route in morning and 1/2 evening (Patient taking differently: 4 mg daily with breakfast. Take one tablet by oral route in morning and 1/2 evening ), Disp: 135 tablet, Rfl: 3  •  insulin lispro protamin/lispro (HUMALOG MIX 75-25,U-100,INSULN SUBQ), Inject 10 Units under the skin 2 (two) times a day.  , Disp: , Rfl:   •  KLOR-CON M20 20 mEq CR tablet, TAKE 1 TABLET BY MOUTH TWICE A DAY, Disp: 60 tablet, Rfl: 0  •  lancets (LANCETS, SUPER THIN) misc, for blood glucose monitoring 3x day, Disp: , Rfl:   •  liraglutide (VICTOZA 3-RUKHSANA) 0.6 mg/0.1 mL (18 mg/3 mL) injection, 0.6mg QD, Disp: ,  Rfl:   •  lisinopril (PRINIVIL) 2.5 mg tablet, Take 1 tablet (2.5 mg total) by mouth daily., Disp: 90 tablet, Rfl: 3  •  metFORMIN (GLUCOPHAGE) 850 mg tablet, Take 1 tablet (850 mg total) by mouth 2 (two) times a day with meals., Disp: 180 tablet, Rfl: 3  •  oxyCODONE (OXY-IR) 5 mg capsule, 5 mg 3 (three) times a day.  , Disp: , Rfl:   •  pantoprazole (PROTONIX) 40 mg EC tablet, TAKE 1 TABLET BY MOUTH EVERY DAY, Disp: 90 tablet, Rfl: 3  •  PEN NEEDLE, DIABETIC (BD ULTRA-FINE MICRO PEN NEEDLE MISC), once daily use with Levemir, Disp: , Rfl:   •  polycarbophil (FIBERCON) 625 mg tablet, Take 2 capsules by mouth daily., Disp: , Rfl:   •  polyethylene glycol (MIRALAX) 17 gram/dose powder, take (17G)  by oral route  every day mixed with 8 oz. water, juice, soda, coffee or tea, Disp: , Rfl:   •  rivaroxaban (XARELTO) 20 mg tablet, Take 1 tablet (20 mg total) by mouth daily with dinner., Disp: 90 tablet, Rfl: 3  •  sildenafil (VIAGRA) 100 mg tablet, , Disp: , Rfl: 0      BP Readings from Last 3 Encounters:   01/31/22 138/72   11/05/19 120/70   07/09/19 118/80       Recent Lab results:  Lab Results   Component Value Date    CHOL 137 06/13/2019   ,   Lab Results   Component Value Date    HDL 41 06/13/2019   ,   Lab Results   Component Value Date    LDLCALC 78 06/13/2019   ,   Lab Results   Component Value Date    TRIG 99 06/13/2019        Lab Results   Component Value Date    GLUCOSE 387 (H) 01/31/2022   ,   Lab Results   Component Value Date    HGBA1C 11.3 (H) 01/31/2022         Lab Results   Component Value Date    CREATININE 1.26 (H) 01/31/2022       Lab Results   Component Value Date    TSH 2.49 07/10/2018

## 2022-08-25 NOTE — ASSESSMENT & PLAN NOTE
Controlled HgbA1c 6.2  He will follow up with endocrinologist but would consider d/c insulin and only using oral agents   Quality 226: Preventive Care And Screening: Tobacco Use: Screening And Cessation Intervention: Patient screened for tobacco use and is an ex/non-smoker Quality 110: Preventive Care And Screening: Influenza Immunization: Influenza Immunization previously received during influenza season Quality 130: Documentation Of Current Medications In The Medical Record: Current Medications Documented Detail Level: Detailed

## 2022-10-03 ENCOUNTER — CLINICAL SUPPORT (OUTPATIENT)
Dept: FAMILY MEDICINE | Facility: CLINIC | Age: 72
End: 2022-10-03
Payer: MEDICARE

## 2022-10-03 DIAGNOSIS — Z23 NEED FOR INFLUENZA VACCINATION: Primary | ICD-10-CM

## 2022-10-03 PROCEDURE — 90694 VACC AIIV4 NO PRSRV 0.5ML IM: CPT | Performed by: FAMILY MEDICINE

## 2022-10-03 PROCEDURE — G0008 ADMIN INFLUENZA VIRUS VAC: HCPCS | Performed by: FAMILY MEDICINE

## 2022-10-13 ENCOUNTER — EXTERNAL RECORD (OUTPATIENT)
Dept: HEALTH INFORMATION MANAGEMENT | Facility: OTHER | Age: 72
End: 2022-10-13

## 2022-10-31 RX ORDER — POTASSIUM CHLORIDE 1500 MG/1
TABLET, EXTENDED RELEASE ORAL
Qty: 180 TABLET | Refills: 0 | Status: SHIPPED | OUTPATIENT
Start: 2022-10-31 | End: 2023-01-31 | Stop reason: SDUPTHER

## 2022-10-31 NOTE — TELEPHONE ENCOUNTER
Medicine Refill Request    Last Office: 1/31/2022   Last Consult Visit: Visit date not found  Last Telemedicine Visit: 12/21/2021 Aziza Randolph DO    Next Appointment: Visit date not found      Current Outpatient Medications:     ascorbic acid (VITAMIN C) 500 mg tablet, Take 1 tablet by mouth daily., Disp: , Rfl:     atorvastatin (LIPITOR) 20 mg tablet, TAKE 1 TABLET BY MOUTH EVERY DAY, Disp: 90 tablet, Rfl: 3    blood sugar diagnostic (CONTOUR NEXT STRIPS) strip, for blood sugar monitoring 3x day, Disp: , Rfl:     bran-gum-fib-gerard-psyl-kelp-pec (FIBER 6) 1,000 mg tablet, Take 2 capsules by mouth daily., Disp: , Rfl:     bumetanide (BUMEX) 1 mg tablet, TAKE 1 TABLET BY MOUTH 3  TIMES DAILY, Disp: 270 tablet, Rfl: 3    cetirizine (ZyrTEC) 10 mg tablet, Take 10 mg by mouth daily., Disp: , Rfl:     cholecalciferol, vitamin D3, (VITAMIN D3) 1,000 unit capsule, one capsule daily, Disp: , Rfl:     cyanocobalamin, vitamin B-12, (VITAMIN B-12 ORAL), Take 1 tablet by mouth daily., Disp: , Rfl:     diclofenac sodium (VOLTAREN) 1 % topical gel, 1 %., Disp: , Rfl:     ferrous sulfate 325 mg (65 mg iron) EC tablet, Take 1 tablet by mouth every other day.  , Disp: , Rfl:     gabapentin (NEURONTIN) 800 mg tablet, Take 300 mg by mouth 2 (two) times a day.  , Disp: , Rfl:     glimepiride (AMARYL) 4 mg tablet, Take one tablet by oral route in morning and 1/2 evening (Patient taking differently: 4 mg daily with breakfast. Take one tablet by oral route in morning and 1/2 evening ), Disp: 135 tablet, Rfl: 3    insulin lispro protamin/lispro (HUMALOG MIX 75-25,U-100,INSULN SUBQ), Inject 10 Units under the skin 2 (two) times a day.  , Disp: , Rfl:     lancets (LANCETS, SUPER THIN) misc, for blood glucose monitoring 3x day, Disp: , Rfl:     liraglutide (VICTOZA 3-RUKHSANA) 0.6 mg/0.1 mL (18 mg/3 mL) injection, 0.6mg QD, Disp: , Rfl:     lisinopril (PRINIVIL) 2.5 mg tablet, Take 1 tablet (2.5 mg total) by mouth daily., Disp:  90 tablet, Rfl: 3    metFORMIN (GLUCOPHAGE) 850 mg tablet, Take 1 tablet (850 mg total) by mouth 2 (two) times a day with meals., Disp: 180 tablet, Rfl: 3    oxyCODONE (OXY-IR) 5 mg capsule, 5 mg 3 (three) times a day.  , Disp: , Rfl:     pantoprazole (PROTONIX) 40 mg EC tablet, TAKE 1 TABLET BY MOUTH EVERY DAY, Disp: 90 tablet, Rfl: 3    PEN NEEDLE, DIABETIC (BD ULTRA-FINE MICRO PEN NEEDLE MISC), once daily use with Levemir, Disp: , Rfl:     polycarbophil (FIBERCON) 625 mg tablet, Take 2 capsules by mouth daily., Disp: , Rfl:     polyethylene glycol (MIRALAX) 17 gram/dose powder, take (17G)  by oral route  every day mixed with 8 oz. water, juice, soda, coffee or tea, Disp: , Rfl:     potassium chloride (KLOR-CON M20) 20 mEq CR tablet, Take 1 tablet (20 mEq total) by mouth 2 (two) times a day., Disp: 180 tablet, Rfl: 0    rivaroxaban (XARELTO) 20 mg tablet, Take 1 tablet (20 mg total) by mouth daily with dinner., Disp: 90 tablet, Rfl: 3    sildenafil (VIAGRA) 100 mg tablet, , Disp: , Rfl: 0      BP Readings from Last 3 Encounters:   01/31/22 138/72   11/05/19 120/70   07/09/19 118/80       Recent Lab results:  Lab Results   Component Value Date    CHOL 137 06/13/2019   ,   Lab Results   Component Value Date    HDL 41 06/13/2019   ,   Lab Results   Component Value Date    LDLCALC 78 06/13/2019   ,   Lab Results   Component Value Date    TRIG 99 06/13/2019        Lab Results   Component Value Date    GLUCOSE 387 (H) 01/31/2022   ,   Lab Results   Component Value Date    HGBA1C 11.3 (H) 01/31/2022         Lab Results   Component Value Date    CREATININE 1.26 (H) 01/31/2022       Lab Results   Component Value Date    TSH 2.49 07/10/2018

## 2023-01-31 DIAGNOSIS — Z12.5 SCREENING FOR PROSTATE CANCER: ICD-10-CM

## 2023-01-31 DIAGNOSIS — E11.649 TYPE 2 DIABETES MELLITUS WITH HYPOGLYCEMIA WITHOUT COMA, WITH LONG-TERM CURRENT USE OF INSULIN (CMS/HCC): Primary | ICD-10-CM

## 2023-01-31 DIAGNOSIS — Z79.4 TYPE 2 DIABETES MELLITUS WITH HYPOGLYCEMIA WITHOUT COMA, WITH LONG-TERM CURRENT USE OF INSULIN (CMS/HCC): Primary | ICD-10-CM

## 2023-01-31 RX ORDER — POTASSIUM CHLORIDE 20 MEQ/1
20 TABLET, EXTENDED RELEASE ORAL 2 TIMES DAILY
Qty: 180 TABLET | Refills: 0 | Status: SHIPPED | OUTPATIENT
Start: 2023-01-31 | End: 2023-05-07

## 2023-01-31 NOTE — TELEPHONE ENCOUNTER
Medicine Refill Request    Last Office: 1/31/2022   Last Consult Visit: Visit date not found  Last Telemedicine Visit: 12/21/2021 Aziza Randolph,     Next Appointment: Visit date not found      Current Outpatient Medications:   •  ascorbic acid (VITAMIN C) 500 mg tablet, Take 1 tablet by mouth daily., Disp: , Rfl:   •  atorvastatin (LIPITOR) 20 mg tablet, TAKE 1 TABLET BY MOUTH EVERY DAY, Disp: 90 tablet, Rfl: 3  •  blood sugar diagnostic (CONTOUR NEXT STRIPS) strip, for blood sugar monitoring 3x day, Disp: , Rfl:   •  bran-gum-fib-geradr-psyl-kelp-pec (FIBER 6) 1,000 mg tablet, Take 2 capsules by mouth daily., Disp: , Rfl:   •  bumetanide (BUMEX) 1 mg tablet, TAKE 1 TABLET BY MOUTH 3  TIMES DAILY, Disp: 270 tablet, Rfl: 3  •  cetirizine (ZyrTEC) 10 mg tablet, Take 10 mg by mouth daily., Disp: , Rfl:   •  cholecalciferol, vitamin D3, (VITAMIN D3) 1,000 unit capsule, one capsule daily, Disp: , Rfl:   •  cyanocobalamin, vitamin B-12, (VITAMIN B-12 ORAL), Take 1 tablet by mouth daily., Disp: , Rfl:   •  diclofenac sodium (VOLTAREN) 1 % topical gel, 1 %., Disp: , Rfl:   •  ferrous sulfate 325 mg (65 mg iron) EC tablet, Take 1 tablet by mouth every other day.  , Disp: , Rfl:   •  gabapentin (NEURONTIN) 800 mg tablet, Take 300 mg by mouth 2 (two) times a day.  , Disp: , Rfl:   •  glimepiride (AMARYL) 4 mg tablet, Take one tablet by oral route in morning and 1/2 evening (Patient taking differently: 4 mg daily with breakfast. Take one tablet by oral route in morning and 1/2 evening ), Disp: 135 tablet, Rfl: 3  •  insulin lispro protamin/lispro (HUMALOG MIX 75-25,U-100,INSULN SUBQ), Inject 10 Units under the skin 2 (two) times a day.  , Disp: , Rfl:   •  KLOR-CON M20 20 mEq CR tablet, TAKE 1 TABLET BY MOUTH 2 TIMES A DAY., Disp: 180 tablet, Rfl: 0  •  lancets (LANCETS, SUPER THIN) misc, for blood glucose monitoring 3x day, Disp: , Rfl:   •  liraglutide (VICTOZA 3-RUKHSANA) 0.6 mg/0.1 mL (18 mg/3 mL) injection, 0.6mg QD, Disp: ,  Rfl:   •  lisinopril (PRINIVIL) 2.5 mg tablet, Take 1 tablet (2.5 mg total) by mouth daily., Disp: 90 tablet, Rfl: 3  •  metFORMIN (GLUCOPHAGE) 850 mg tablet, Take 1 tablet (850 mg total) by mouth 2 (two) times a day with meals., Disp: 180 tablet, Rfl: 3  •  oxyCODONE (OXY-IR) 5 mg capsule, 5 mg 3 (three) times a day.  , Disp: , Rfl:   •  pantoprazole (PROTONIX) 40 mg EC tablet, TAKE 1 TABLET BY MOUTH EVERY DAY, Disp: 90 tablet, Rfl: 3  •  PEN NEEDLE, DIABETIC (BD ULTRA-FINE MICRO PEN NEEDLE MISC), once daily use with Levemir, Disp: , Rfl:   •  polycarbophil (FIBERCON) 625 mg tablet, Take 2 capsules by mouth daily., Disp: , Rfl:   •  polyethylene glycol (MIRALAX) 17 gram/dose powder, take (17G)  by oral route  every day mixed with 8 oz. water, juice, soda, coffee or tea, Disp: , Rfl:   •  rivaroxaban (XARELTO) 20 mg tablet, Take 1 tablet (20 mg total) by mouth daily with dinner., Disp: 90 tablet, Rfl: 3  •  sildenafil (VIAGRA) 100 mg tablet, , Disp: , Rfl: 0      BP Readings from Last 3 Encounters:   01/31/22 138/72   11/05/19 120/70   07/09/19 118/80       Recent Lab results:  Lab Results   Component Value Date    CHOL 137 06/13/2019   ,   Lab Results   Component Value Date    HDL 41 06/13/2019   ,   Lab Results   Component Value Date    LDLCALC 78 06/13/2019   ,   Lab Results   Component Value Date    TRIG 99 06/13/2019        Lab Results   Component Value Date    GLUCOSE 387 (H) 01/31/2022   ,   Lab Results   Component Value Date    HGBA1C 11.3 (H) 01/31/2022         Lab Results   Component Value Date    CREATININE 1.26 (H) 01/31/2022       Lab Results   Component Value Date    TSH 2.49 07/10/2018

## 2023-01-31 NOTE — TELEPHONE ENCOUNTER
Spoke to pt and schedule . Requested labs to sent to [facility]    This is a recent snapshot of the patient's [facility] medical record.  For current drug dose and complete information and questions, call [phone] or In Basket pool, [facility] ([id number])  CSN Number:  [id number]

## 2023-02-15 ENCOUNTER — OFFICE VISIT (OUTPATIENT)
Dept: FAMILY MEDICINE | Facility: CLINIC | Age: 73
End: 2023-02-15
Payer: MEDICARE

## 2023-02-15 VITALS
SYSTOLIC BLOOD PRESSURE: 124 MMHG | BODY MASS INDEX: 42.66 KG/M2 | HEART RATE: 67 BPM | WEIGHT: 315 LBS | HEIGHT: 72 IN | OXYGEN SATURATION: 97 % | RESPIRATION RATE: 16 BRPM | TEMPERATURE: 97.8 F | DIASTOLIC BLOOD PRESSURE: 76 MMHG

## 2023-02-15 DIAGNOSIS — I48.19 PERSISTENT ATRIAL FIBRILLATION (CMS/HCC): ICD-10-CM

## 2023-02-15 DIAGNOSIS — E13.40 OTHER SPECIFIED DIABETES MELLITUS WITH DIABETIC NEUROPATHY, UNSPECIFIED (CMS/HCC): Primary | ICD-10-CM

## 2023-02-15 DIAGNOSIS — E66.01 MORBID OBESITY (CMS/HCC): ICD-10-CM

## 2023-02-15 DIAGNOSIS — E87.6 HYPOKALEMIA: ICD-10-CM

## 2023-02-15 DIAGNOSIS — R06.02 SHORTNESS OF BREATH: ICD-10-CM

## 2023-02-15 DIAGNOSIS — G89.29 OTHER CHRONIC PAIN: ICD-10-CM

## 2023-02-15 PROBLEM — Z00.00 INITIAL MEDICARE ANNUAL WELLNESS VISIT: Status: RESOLVED | Noted: 2022-02-01 | Resolved: 2023-02-15

## 2023-02-15 PROBLEM — R79.89 ELEVATED SERUM CREATININE: Status: RESOLVED | Noted: 2019-07-09 | Resolved: 2023-02-15

## 2023-02-15 PROBLEM — H61.21 IMPACTED CERUMEN OF RIGHT EAR: Status: RESOLVED | Noted: 2022-02-01 | Resolved: 2023-02-15

## 2023-02-15 PROCEDURE — G8754 DIAS BP LESS 90: HCPCS | Performed by: FAMILY MEDICINE

## 2023-02-15 PROCEDURE — G8752 SYS BP LESS 140: HCPCS | Performed by: FAMILY MEDICINE

## 2023-02-15 PROCEDURE — 99214 OFFICE O/P EST MOD 30 MIN: CPT | Performed by: FAMILY MEDICINE

## 2023-02-15 RX ORDER — SEMAGLUTIDE 1.34 MG/ML
INJECTION, SOLUTION SUBCUTANEOUS
COMMUNITY
Start: 2023-01-05 | End: 2023-05-30

## 2023-02-15 RX ORDER — GABAPENTIN 300 MG/1
300 CAPSULE ORAL 3 TIMES DAILY
COMMUNITY
End: 2023-06-12

## 2023-02-15 NOTE — ASSESSMENT & PLAN NOTE
More recent onset  Suspect deconditioning  PE unlikely given the fact that he is taking Xarelto  Recommend he schedule a follow-up with his cardiologist to evaluate for cardiac issue

## 2023-02-15 NOTE — ASSESSMENT & PLAN NOTE
Was on oxycodone, Dilaudid, and high-dose gabapentin in the past  He was hospitalized for being overmedicated when on this regimen  He is seeing a different pain management specialist now who has reduced the dose of his oxycodone and gabapentin  Pain is manageable with current level of meds

## 2023-02-15 NOTE — PROGRESS NOTES
Subjective      Patient ID: Mitchell Sauceda is a 72 y.o. male.    CC: DM    -here with his wife today     DM   -seeing Dr. Nettles   -they changed him to ozempic about 1.5 months ago   -he is not checking his BG  -has been a while since he lab work done, he thinks over a year  -he is not checking his BG at home  -sometimes notices a tremor with reaching out but not always    Neuropathy/Arthritis  -walks at home with a walker short distances  -when he goes out he uses an electric chair  -he is taking gabapentin 300 mg tid for his neuropathy   -he was taking oxycodone, dilaudid, and high dose gabapentin   -went to the hospital and was told he was overmedicated  -they lowered his gabapentin dosage and d/c the dilaudid   -taking the oxycodone most days     Shortness of breath  -Noticed that over the past few weeks  -It is only when he walks a longer distance than he is used to  -He does not have any shortness of breath at rest  -No chest pain or increased heart palpitations       Allergies  Meds  Problems       Review of Systems    As above    Allergies   Allergen Reactions   • Vancomycin    • Prednisone Hives     Other reaction(s): rash/hives     Current Outpatient Medications   Medication Sig Dispense Refill   • ascorbic acid (VITAMIN C) 500 mg tablet Take 1 tablet by mouth daily.     • atorvastatin (LIPITOR) 20 mg tablet TAKE 1 TABLET BY MOUTH EVERY DAY 90 tablet 3   • bran-gum-fib-gerard-psyl-kelp-pec 1,000 mg tablet Take 2 capsules by mouth daily.     • bumetanide (BUMEX) 1 mg tablet TAKE 1 TABLET BY MOUTH 3  TIMES DAILY 270 tablet 3   • cetirizine (ZyrTEC) 10 mg tablet Take 10 mg by mouth daily.     • cholecalciferol, vitamin D3, 25 mcg (1,000 unit) capsule one capsule daily     • cyanocobalamin, vitamin B-12, (VITAMIN B-12 ORAL) Take 1 tablet by mouth daily.     • diclofenac sodium (VOLTAREN) 1 % topical gel 1 %.     • ferrous sulfate 325 mg (65 mg iron) EC tablet Take 1 tablet by mouth every other day.       •  gabapentin (NEURONTIN) 300 mg capsule Take 300 mg by mouth 3 (three) times a day.     • glimepiride (AMARYL) 4 mg tablet Take one tablet by oral route in morning and 1/2 evening (Patient taking differently: 4 mg daily with breakfast. Take one tablet by oral route in morning and 1/2 evening) 135 tablet 3   • insulin lispro protamin/lispro (HUMALOG MIX 75-25 KWIKPEN SUBQ) Inject 25 Units under the skin 2 (two) times a day.     • lancets misc for blood glucose monitoring 3x day     • lisinopril (PRINIVIL) 2.5 mg tablet Take 1 tablet (2.5 mg total) by mouth daily. 90 tablet 3   • metFORMIN (GLUCOPHAGE) 850 mg tablet Take 1 tablet (850 mg total) by mouth 2 (two) times a day with meals. 180 tablet 3   • oxyCODONE (OXY-IR) 5 mg capsule 5 mg 3 (three) times a day.       • pantoprazole (PROTONIX) 40 mg EC tablet TAKE 1 TABLET BY MOUTH EVERY DAY 90 tablet 3   • PEN NEEDLE, DIABETIC (BD ULTRA-FINE MICRO PEN NEEDLE MISC) once daily use with Levemir     • polyethylene glycol (MIRALAX) 17 gram/dose powder take (17G)  by oral route  every day mixed with 8 oz. water, juice, soda, coffee or tea     • potassium chloride (KLOR-CON M20) 20 mEq CR tablet Take 1 tablet (20 mEq total) by mouth 2 (two) times a day. 180 tablet 0   • rivaroxaban (XARELTO) 20 mg tablet Take 1 tablet (20 mg total) by mouth daily with dinner. 90 tablet 3   • sildenafil (VIAGRA) 100 mg tablet   0   • OZEMPIC 1MG SOLUTION PEN-INJECTOR UNDER SKIN WEEKLY     • polycarbophil (FIBERCON) 625 mg tablet Take 2 capsules by mouth daily.       No current facility-administered medications for this visit.     Past Medical History:   Diagnosis Date   • Arthritis    • GERD (gastroesophageal reflux disease)    • Hypertension    • Lipid disorder    • Neuropathy    • Persistent atrial fibrillation (CMS/HCC) 3/19/2019   • Primary osteoarthritis involving multiple joints 5/15/2019   • Pulmonary embolism (CMS/HCC)    • Type 2 diabetes mellitus (CMS/HCC)    • Type 2 diabetes mellitus  with hypoglycemia (CMS/HCC) 10/24/2013    Overview:      Past Surgical History:   Procedure Laterality Date   • KNEE ARTHROSCOPY Bilateral    • VEIN SURGERY Right      Social History     Tobacco Use   • Smoking status: Never   • Smokeless tobacco: Never   Substance Use Topics   • Alcohol use: No     Family History   Problem Relation Age of Onset   • Factor V Leiden deficiency Biological Sister    • Factor V Leiden deficiency Nephew        Objective   Vitals:    02/15/23 1425   BP: 124/76   BP Location: Left upper arm   Patient Position: Sitting   Pulse: 67   Resp: 16   Temp: 36.6 °C (97.8 °F)   SpO2: 97%   Weight: (!) 145 kg (320 lb)   Height: 1.829 m (6')    Body mass index is 43.4 kg/m².  Physical Exam  Vitals reviewed.   Constitutional:       General: He is not in acute distress.     Appearance: He is not ill-appearing.   Cardiovascular:      Rate and Rhythm: Normal rate and regular rhythm.      Heart sounds: No murmur heard.    No friction rub. No gallop.      Comments: Massive edema of bilateral lower extremities below knee.  Thickened scaled skin bilaterally  Pulmonary:      Effort: Pulmonary effort is normal. No respiratory distress.      Breath sounds: No wheezing, rhonchi or rales.   Musculoskeletal:      Comments: In motorized wheelchair   Neurological:      Mental Status: He is alert.         Assessment/Plan   Diagnoses and all orders for this visit:    Other specified diabetes mellitus with diabetic neuropathy, unspecified (CMS/HCC) (Primary)  Assessment & Plan:  Has not had recent lab work  His wife will take him to get blood work soon  Discussed importance of regular follow-up on his blood work  I am concerned that his tremor at times potentially could be hypo or hyperglycemia as he is on insulin and does not monitor his blood sugar at home.  Advised him to check his blood sugar next timehe has a tremor to evaluate further      Persistent atrial fibrillation (CMS/HCC)  Assessment & Plan:  Seeing  cardiology  Continue Xarelto      Morbid obesity (CMS/[facility])  Assessment & Plan:  Work on dietary improvements to lose weight      Hypokalemia  Assessment & Plan:  He is on Bumex  Taking high-dose of potassium as well  Discussed importance of regular lab work while on the potassium to ensure it is in the proper range      Shortness of breath  Assessment & Plan:  More recent onset  Suspect deconditioning  PE unlikely given the fact that he is taking Xarelto  Recommend he schedule a follow-up with his cardiologist to evaluate for cardiac issue      Other chronic pain  Assessment & Plan:  Was on oxycodone, Dilaudid, and high-dose gabapentin in the past  He was hospitalized for being overmedicated when on this regimen  He is seeing a different pain management specialist now who has reduced the dose of his oxycodone and gabapentin  Pain is manageable with current level of meds               Richard Ojeda,     This note was created with voice recognition software. Inadvertent dictation errors should be disregarded. Please contact my office for clarification.

## 2023-02-15 NOTE — PATIENT INSTRUCTIONS
Please have lab work     Let the cardiologist know about the shortness of breath     Follow up in 6 months or sooner if needed

## 2023-02-15 NOTE — ASSESSMENT & PLAN NOTE
He is on Bumex  Taking high-dose of potassium as well  Discussed importance of regular lab work while on the potassium to ensure it is in the proper range

## 2023-02-15 NOTE — ASSESSMENT & PLAN NOTE
Has not had recent lab work  His wife will take him to get blood work soon  Discussed importance of regular follow-up on his blood work  I am concerned that his tremor at times potentially could be hypo or hyperglycemia as he is on insulin and does not monitor his blood sugar at home.  Advised him to check his blood sugar next timehe has a tremor to evaluate further

## 2023-03-23 ENCOUNTER — TELEPHONE (OUTPATIENT)
Dept: FAMILY MEDICINE | Facility: CLINIC | Age: 73
End: 2023-03-23
Payer: MEDICARE

## 2023-03-23 NOTE — TELEPHONE ENCOUNTER
He does not need re-evaluation unless something has changed  Have they reached out to [facility] to start the process of getting home care?

## 2023-03-23 NOTE — TELEPHONE ENCOUNTER
Spoke to patients daughter.   Pt fell about 2 weeks ago and had EMTS come to the house and assist getting home up. ( never taken to ER or hospital)   Daughter wants to get home care set up for pt because has debilitating mobiltiy that is getting worse.   Daughter said that is hard to get him into the office but is scheduled for Monday for eval .   He was seen last month for a routine check     Would like to know if he needs to be seen since this has been an ongoing issues?

## 2023-03-27 ENCOUNTER — TELEPHONE (OUTPATIENT)
Dept: FAMILY MEDICINE | Facility: CLINIC | Age: 73
End: 2023-03-27

## 2023-03-27 DIAGNOSIS — M15.0 PRIMARY OSTEOARTHRITIS INVOLVING MULTIPLE JOINTS: ICD-10-CM

## 2023-03-27 DIAGNOSIS — I48.19 PERSISTENT ATRIAL FIBRILLATION (CMS/HCC): Primary | ICD-10-CM

## 2023-03-27 DIAGNOSIS — Z79.4 TYPE 2 DIABETES MELLITUS WITH HYPOGLYCEMIA WITHOUT COMA, WITH LONG-TERM CURRENT USE OF INSULIN (CMS/HCC): ICD-10-CM

## 2023-03-27 DIAGNOSIS — E11.649 TYPE 2 DIABETES MELLITUS WITH HYPOGLYCEMIA WITHOUT COMA, WITH LONG-TERM CURRENT USE OF INSULIN (CMS/HCC): ICD-10-CM

## 2023-03-27 NOTE — TELEPHONE ENCOUNTER
Ordered  Please send requested documents  It is for home care, not hospice. [facility] does home care.

## 2023-03-27 NOTE — TELEPHONE ENCOUNTER
Pt's daughter, Kath, would like to set up Hospice evaluation, to help care for patient, until she can make arrangements to move parents to where she lives.     [facility] will need order for eval/tx, copies of last few OV notes and demographic sheet.    Please fax to Maggie @ [phone] ([facility]).    NOTE: (KD), WE CAN ELECTRONICALLY FAX ALL REQUESTED RECORDS TO [facility], NO NEED TO PRINT ANYTHING OUT.

## 2023-03-29 RX ORDER — ATORVASTATIN CALCIUM 20 MG/1
20 TABLET, FILM COATED ORAL DAILY
Qty: 90 TABLET | Refills: 3 | Status: SHIPPED | OUTPATIENT
Start: 2023-03-29

## 2023-03-29 NOTE — TELEPHONE ENCOUNTER
Medicine Refill Request    Last Office: 2/15/2023   Last Consult Visit: Visit date not found  Last Telemedicine Visit: 12/21/2021 Aziza Randolph DO    Next Appointment: 8/15/2023      Current Outpatient Medications:   •  ascorbic acid (VITAMIN C) 500 mg tablet, Take 1 tablet by mouth daily., Disp: , Rfl:   •  atorvastatin (LIPITOR) 20 mg tablet, TAKE 1 TABLET BY MOUTH EVERY DAY, Disp: 90 tablet, Rfl: 3  •  bran-gum-fib-gerard-psyl-kelp-pec 1,000 mg tablet, Take 2 capsules by mouth daily., Disp: , Rfl:   •  bumetanide (BUMEX) 1 mg tablet, TAKE 1 TABLET BY MOUTH 3  TIMES DAILY, Disp: 270 tablet, Rfl: 3  •  cetirizine (ZyrTEC) 10 mg tablet, Take 10 mg by mouth daily., Disp: , Rfl:   •  cholecalciferol, vitamin D3, 25 mcg (1,000 unit) capsule, one capsule daily, Disp: , Rfl:   •  cyanocobalamin, vitamin B-12, (VITAMIN B-12 ORAL), Take 1 tablet by mouth daily., Disp: , Rfl:   •  diclofenac sodium (VOLTAREN) 1 % topical gel, 1 %., Disp: , Rfl:   •  ferrous sulfate 325 mg (65 mg iron) EC tablet, Take 1 tablet by mouth every other day.  , Disp: , Rfl:   •  gabapentin (NEURONTIN) 300 mg capsule, Take 300 mg by mouth 3 (three) times a day., Disp: , Rfl:   •  glimepiride (AMARYL) 4 mg tablet, Take one tablet by oral route in morning and 1/2 evening (Patient taking differently: 4 mg daily with breakfast. Take one tablet by oral route in morning and 1/2 evening), Disp: 135 tablet, Rfl: 3  •  insulin lispro protamin/lispro (HUMALOG MIX 75-25 KWIKPEN SUBQ), Inject 25 Units under the skin 2 (two) times a day., Disp: , Rfl:   •  lancets misc, for blood glucose monitoring 3x day, Disp: , Rfl:   •  lisinopril (PRINIVIL) 2.5 mg tablet, Take 1 tablet (2.5 mg total) by mouth daily., Disp: 90 tablet, Rfl: 3  •  metFORMIN (GLUCOPHAGE) 850 mg tablet, Take 1 tablet (850 mg total) by mouth 2 (two) times a day with meals., Disp: 180 tablet, Rfl: 3  •  oxyCODONE (OXY-IR) 5 mg capsule, 5 mg 3 (three) times a day.  , Disp: , Rfl:   •  OZEMPIC,  1MG SOLUTION PEN-INJECTOR UNDER SKIN WEEKLY, Disp: , Rfl:   •  pantoprazole (PROTONIX) 40 mg EC tablet, TAKE 1 TABLET BY MOUTH EVERY DAY, Disp: 90 tablet, Rfl: 3  •  PEN NEEDLE, DIABETIC (BD ULTRA-FINE MICRO PEN NEEDLE MISC), once daily use with Levemir, Disp: , Rfl:   •  polycarbophil (FIBERCON) 625 mg tablet, Take 2 capsules by mouth daily., Disp: , Rfl:   •  polyethylene glycol (MIRALAX) 17 gram/dose powder, take (17G)  by oral route  every day mixed with 8 oz. water, juice, soda, coffee or tea, Disp: , Rfl:   •  potassium chloride (KLOR-CON M20) 20 mEq CR tablet, Take 1 tablet (20 mEq total) by mouth 2 (two) times a day., Disp: 180 tablet, Rfl: 0  •  rivaroxaban (XARELTO) 20 mg tablet, Take 1 tablet (20 mg total) by mouth daily with dinner., Disp: 90 tablet, Rfl: 3  •  sildenafil (VIAGRA) 100 mg tablet, , Disp: , Rfl: 0      BP Readings from Last 3 Encounters:   02/15/23 124/76   01/31/22 138/72   11/05/19 120/70       Recent Lab results:  Lab Results   Component Value Date    CHOL 137 06/13/2019   ,   Lab Results   Component Value Date    HDL 41 06/13/2019   ,   Lab Results   Component Value Date    LDLCALC 78 06/13/2019   ,   Lab Results   Component Value Date    TRIG 99 06/13/2019        Lab Results   Component Value Date    GLUCOSE 387 (H) 01/31/2022   ,   Lab Results   Component Value Date    HGBA1C 11.3 (H) 01/31/2022         Lab Results   Component Value Date    CREATININE 1.26 (H) 01/31/2022       Lab Results   Component Value Date    TSH 2.49 07/10/2018

## 2023-04-10 RX ORDER — PANTOPRAZOLE SODIUM 40 MG/1
40 TABLET, DELAYED RELEASE ORAL DAILY
Qty: 90 TABLET | Refills: 0 | Status: SHIPPED | OUTPATIENT
Start: 2023-04-10 | End: 2023-06-16 | Stop reason: SDUPTHER

## 2023-04-10 NOTE — TELEPHONE ENCOUNTER
Medicine Refill Request    Last Office: 2/15/2023   Last Consult Visit: Visit date not found  Last Telemedicine Visit: 12/21/2021 Aziza Randolph DO    Next Appointment: 8/15/2023      Current Outpatient Medications:   •  ascorbic acid (VITAMIN C) 500 mg tablet, Take 1 tablet by mouth daily., Disp: , Rfl:   •  atorvastatin (LIPITOR) 20 mg tablet, Take 1 tablet (20 mg total) by mouth daily., Disp: 90 tablet, Rfl: 3  •  bran-gum-fib-gerard-psyl-kelp-pec 1,000 mg tablet, Take 2 capsules by mouth daily., Disp: , Rfl:   •  bumetanide (BUMEX) 1 mg tablet, TAKE 1 TABLET BY MOUTH 3  TIMES DAILY, Disp: 270 tablet, Rfl: 3  •  cetirizine (ZyrTEC) 10 mg tablet, Take 10 mg by mouth daily., Disp: , Rfl:   •  cholecalciferol, vitamin D3, 25 mcg (1,000 unit) capsule, one capsule daily, Disp: , Rfl:   •  cyanocobalamin, vitamin B-12, (VITAMIN B-12 ORAL), Take 1 tablet by mouth daily., Disp: , Rfl:   •  diclofenac sodium (VOLTAREN) 1 % topical gel, 1 %., Disp: , Rfl:   •  ferrous sulfate 325 mg (65 mg iron) EC tablet, Take 1 tablet by mouth every other day.  , Disp: , Rfl:   •  gabapentin (NEURONTIN) 300 mg capsule, Take 300 mg by mouth 3 (three) times a day., Disp: , Rfl:   •  glimepiride (AMARYL) 4 mg tablet, Take one tablet by oral route in morning and 1/2 evening (Patient taking differently: 4 mg daily with breakfast. Take one tablet by oral route in morning and 1/2 evening), Disp: 135 tablet, Rfl: 3  •  insulin lispro protamin/lispro (HUMALOG MIX 75-25 KWIKPEN SUBQ), Inject 25 Units under the skin 2 (two) times a day., Disp: , Rfl:   •  lancets misc, for blood glucose monitoring 3x day, Disp: , Rfl:   •  lisinopril (PRINIVIL) 2.5 mg tablet, Take 1 tablet (2.5 mg total) by mouth daily., Disp: 90 tablet, Rfl: 3  •  metFORMIN (GLUCOPHAGE) 850 mg tablet, Take 1 tablet (850 mg total) by mouth 2 (two) times a day with meals., Disp: 180 tablet, Rfl: 3  •  oxyCODONE (OXY-IR) 5 mg capsule, 5 mg 3 (three) times a day.  , Disp: , Rfl:   •   OZEMPIC, 1MG SOLUTION PEN-INJECTOR UNDER SKIN WEEKLY, Disp: , Rfl:   •  pantoprazole (PROTONIX) 40 mg EC tablet, TAKE 1 TABLET BY MOUTH EVERY DAY, Disp: 90 tablet, Rfl: 3  •  PEN NEEDLE, DIABETIC (BD ULTRA-FINE MICRO PEN NEEDLE MISC), once daily use with Levemir, Disp: , Rfl:   •  polycarbophil (FIBERCON) 625 mg tablet, Take 2 capsules by mouth daily., Disp: , Rfl:   •  polyethylene glycol (MIRALAX) 17 gram/dose powder, take (17G)  by oral route  every day mixed with 8 oz. water, juice, soda, coffee or tea, Disp: , Rfl:   •  potassium chloride (KLOR-CON M20) 20 mEq CR tablet, Take 1 tablet (20 mEq total) by mouth 2 (two) times a day., Disp: 180 tablet, Rfl: 0  •  rivaroxaban (XARELTO) 20 mg tablet, Take 1 tablet (20 mg total) by mouth daily with dinner., Disp: 90 tablet, Rfl: 3  •  sildenafil (VIAGRA) 100 mg tablet, , Disp: , Rfl: 0      BP Readings from Last 3 Encounters:   02/15/23 124/76   01/31/22 138/72   11/05/19 120/70       Recent Lab results:  Lab Results   Component Value Date    CHOL 137 06/13/2019   ,   Lab Results   Component Value Date    HDL 41 06/13/2019   ,   Lab Results   Component Value Date    LDLCALC 78 06/13/2019   ,   Lab Results   Component Value Date    TRIG 99 06/13/2019        Lab Results   Component Value Date    GLUCOSE 387 (H) 01/31/2022   ,   Lab Results   Component Value Date    HGBA1C 11.3 (H) 01/31/2022         Lab Results   Component Value Date    CREATININE 1.26 (H) 01/31/2022       Lab Results   Component Value Date    TSH 2.49 07/10/2018

## 2023-05-07 RX ORDER — POTASSIUM CHLORIDE 1500 MG/1
TABLET, EXTENDED RELEASE ORAL
Qty: 180 TABLET | Refills: 0 | Status: SHIPPED | OUTPATIENT
Start: 2023-05-07 | End: 2024-04-19

## 2023-05-07 NOTE — TELEPHONE ENCOUNTER
Medicine Refill Request    Last Office: 2/15/2023   Last Consult Visit: Visit date not found  Last Telemedicine Visit: 12/21/2021 Aziza Randolph DO    Next Appointment: 8/15/2023      Current Outpatient Medications:   •  ascorbic acid (VITAMIN C) 500 mg tablet, Take 1 tablet by mouth daily., Disp: , Rfl:   •  atorvastatin (LIPITOR) 20 mg tablet, Take 1 tablet (20 mg total) by mouth daily., Disp: 90 tablet, Rfl: 3  •  bran-gum-fib-gerard-psyl-kelp-pec 1,000 mg tablet, Take 2 capsules by mouth daily., Disp: , Rfl:   •  bumetanide (BUMEX) 1 mg tablet, TAKE 1 TABLET BY MOUTH 3  TIMES DAILY, Disp: 270 tablet, Rfl: 3  •  cetirizine (ZyrTEC) 10 mg tablet, Take 10 mg by mouth daily., Disp: , Rfl:   •  cholecalciferol, vitamin D3, 25 mcg (1,000 unit) capsule, one capsule daily, Disp: , Rfl:   •  cyanocobalamin, vitamin B-12, (VITAMIN B-12 ORAL), Take 1 tablet by mouth daily., Disp: , Rfl:   •  diclofenac sodium (VOLTAREN) 1 % topical gel, 1 %., Disp: , Rfl:   •  ferrous sulfate 325 mg (65 mg iron) EC tablet, Take 1 tablet by mouth every other day.  , Disp: , Rfl:   •  gabapentin (NEURONTIN) 300 mg capsule, Take 300 mg by mouth 3 (three) times a day., Disp: , Rfl:   •  glimepiride (AMARYL) 4 mg tablet, Take one tablet by oral route in morning and 1/2 evening (Patient taking differently: 4 mg daily with breakfast. Take one tablet by oral route in morning and 1/2 evening), Disp: 135 tablet, Rfl: 3  •  insulin lispro protamin/lispro (HUMALOG MIX 75-25 KWIKPEN SUBQ), Inject 25 Units under the skin 2 (two) times a day., Disp: , Rfl:   •  lancets misc, for blood glucose monitoring 3x day, Disp: , Rfl:   •  lisinopril (PRINIVIL) 2.5 mg tablet, Take 1 tablet (2.5 mg total) by mouth daily., Disp: 90 tablet, Rfl: 3  •  metFORMIN (GLUCOPHAGE) 850 mg tablet, Take 1 tablet (850 mg total) by mouth 2 (two) times a day with meals., Disp: 180 tablet, Rfl: 3  •  oxyCODONE (OXY-IR) 5 mg capsule, 5 mg 3 (three) times a day.  , Disp: , Rfl:   •   OZEMPIC, 1MG SOLUTION PEN-INJECTOR UNDER SKIN WEEKLY, Disp: , Rfl:   •  pantoprazole (PROTONIX) 40 mg EC tablet, Take 1 tablet (40 mg total) by mouth daily., Disp: 90 tablet, Rfl: 0  •  PEN NEEDLE, DIABETIC (BD ULTRA-FINE MICRO PEN NEEDLE MISC), once daily use with Levemir, Disp: , Rfl:   •  polycarbophil (FIBERCON) 625 mg tablet, Take 2 capsules by mouth daily., Disp: , Rfl:   •  polyethylene glycol (MIRALAX) 17 gram/dose powder, take (17G)  by oral route  every day mixed with 8 oz. water, juice, soda, coffee or tea, Disp: , Rfl:   •  potassium chloride (KLOR-CON M20) 20 mEq CR tablet, Take 1 tablet (20 mEq total) by mouth 2 (two) times a day., Disp: 180 tablet, Rfl: 0  •  rivaroxaban (XARELTO) 20 mg tablet, Take 1 tablet (20 mg total) by mouth daily with dinner., Disp: 90 tablet, Rfl: 3  •  sildenafil (VIAGRA) 100 mg tablet, , Disp: , Rfl: 0      BP Readings from Last 3 Encounters:   02/15/23 124/76   01/31/22 138/72   11/05/19 120/70       Recent Lab results:  Lab Results   Component Value Date    CHOL 137 06/13/2019   ,   Lab Results   Component Value Date    HDL 41 06/13/2019   ,   Lab Results   Component Value Date    LDLCALC 78 06/13/2019   ,   Lab Results   Component Value Date    TRIG 99 06/13/2019        Lab Results   Component Value Date    GLUCOSE 387 (H) 01/31/2022   ,   Lab Results   Component Value Date    HGBA1C 11.3 (H) 01/31/2022         Lab Results   Component Value Date    CREATININE 1.26 (H) 01/31/2022       Lab Results   Component Value Date    TSH 2.49 07/10/2018

## 2023-05-17 ENCOUNTER — EXTERNAL RECORD (OUTPATIENT)
Dept: HEALTH INFORMATION MANAGEMENT | Facility: OTHER | Age: 73
End: 2023-05-17

## 2023-05-19 LAB — HBA1C MFR BLD: 7.4 %

## 2023-05-22 ENCOUNTER — PATIENT OUTREACH (OUTPATIENT)
Dept: CASE MANAGEMENT | Facility: CLINIC | Age: 73
End: 2023-05-22
Payer: MEDICARE

## 2023-05-22 NOTE — PROGRESS NOTES
ACM: Patient outreach, MARTIR call #1.Message left for patient including my role and contact phone number for return call. I will continue to follow/provide patient outreach.   S/P Hospital Admission on 5/17, with DC 5/21.                   Mallory Orellana, RN  Ambulatory Care Manager

## 2023-05-23 ENCOUNTER — TELEPHONE (OUTPATIENT)
Dept: FAMILY MEDICINE | Facility: CLINIC | Age: 73
End: 2023-05-23
Payer: MEDICARE

## 2023-05-23 RX ORDER — BUMETANIDE 1 MG/1
1 TABLET ORAL DAILY
COMMUNITY
End: 2023-05-30

## 2023-05-23 RX ORDER — BUMETANIDE 2 MG/1
2 TABLET ORAL DAILY
COMMUNITY
End: 2023-09-10 | Stop reason: ENTERED-IN-ERROR

## 2023-05-23 ASSESSMENT — ENCOUNTER SYMPTOMS
WOUND: 1
CHILLS: 0
DIAPHORESIS: 0
FEVER: 0
APPETITE CHANGE: 0
ACTIVITY CHANGE: 1
FATIGUE: 1

## 2023-05-23 NOTE — TELEPHONE ENCOUNTER
----- Message from Mallory Orellana RN sent at 5/23/2023  4:24 PM EDT -----  Regarding: MARTIR Appt.  Hi, pt dc'd from [facility] on 5/21. Needs an Appt. W/ Lynette preferably prior to 6/4. Can you pls. Assist this Pt.? TY!

## 2023-05-23 NOTE — PROGRESS NOTES
NAME: Mitchell Sauceda    MRN: [id number]    YOB: 1950    Event Review:    Initial TCM Patient Outreach Date: 05/22/23    Assessment completed with: Spouse or Significant Other  Patient stated reason for hospitalization: Lethargic, confused  Discharge Diagnosis: Hypoglycemia    Patient readmitted in the last 30 days: No  Discharging Facility: [facility]  Date of Last Admission: 05/17/23  Date of Last Discharge: 05/21/23    Discharging Facilty SNF/Rehab: None          Patient's perception of their health status since discharge: Improving    HPI:Spoke with Patient's spouse today. Pt went to [facility] with lethargy. Blood sugar was 51. Known Diabetic with left non healing diabetic foot wound.   He was lethargic, confused upon arrival to ED but given Dextrose, he improved.   CXR with moderate cardiomegaly, pulmonary edema. U/A negative. Cardiology/Nephrology consulted.   Labs = for K 5.5, Cr 2.52, BUN 41.  He c/o dyspnea on exertion.  And was given IV Bumex/ diuresed. He is on Bumex 3x per day  at home and apparently does not always take this.  He continued to diurese and his creatine improved to 2.26.  Podiatry consulted for left foot wounds. Negative for any Osteomyelitis.   Insulin was decreased to 10 units as opposed to 25.  He was discharged to home on oral Bumex two times a day   Due to non-compliance.  Today, I spoke with his spouse. They live in a two story home but he stays on first floor. Daughter has hired an Aide to assist with their care.  He has been ordered Medi-honey gel  For left heel wound.  To wash  with foam wash first daily.. Podiatry f/u in one week with Dr. Maddox for wet therapy. Provided contact number to schedule.  I ordered home care Given choice, chose [facility]. Referral for RN, PT and OT. He uses his walker and has an electric wheelchair for longer distances.  Medication maintenance and daily wound care is needed.  Spouse reports he needs  reminders to check his blood sugars in AM and will comply when directed.  Discussed medication changes and follow up needed with Specialists.   Nursing visits/PT/OT now planned.        Review of Systems   Constitutional: Positive for activity change and fatigue. Negative for appetite change, chills, diaphoresis and fever.   Skin: Positive for wound.       Medication Review:    Medication Review: Yes     Reported by: Spouse  Any new medications prescribed at discharge?: No  Is the patient having any side effects they believe may be caused by any medication additions or changes?: No        Do you have enough of your regularly prescribed medications?: Yes       Medication adherence problem?: No  Was a medication discrepancy indentified?: No       Nursing Interventions: No intervention needed  Reconciled the current and discharge medications: Yes  Reviewed AVS (Discharge Instructions)?: Yes         Acute Pain:    Acute pain: No        Chronic Pain:    Chronic pain: No     Diet/Nutrition:    Type of Diet: Diabetic  Diet Adherence: Adherent with diet          Home Care Services:    Home Care Agency: [facility]  Type of Home Care Services: Home PT, Home OT, Home Nursing  Home Care Interventions: Accepted post-discharge     Post-Discharge Durable Medical Equipment::    Durable Medical Equipment: Electric wheelchair, Front wheeled walker, Glucometer  Oxygen Use: No           Has all Durable Medical Equipment (DME) been delivered?: Yes  DME Interventions: No intervention required    Home Management:    Living Arrangement: Paid Caregiver, Spouse  Support System:: Family  Type of Residence: 2 story house     Any patient reported falls in the last 3 months?: No  Baptist or spiritual beliefs that impact treatment?: No    Appointment Scheduling:    PCP appointment scheduled: No              Patient Scheduling Dispositions: No availability on schedule/message sent to office to assist with scheduling     Follow-Ups:       Relevant  Specialist Follow-ups: Podiatry, Nephrology    Interventions/ Care Coordination:    Interventions/ Care Coordination: Encouraged patient to schedule with the PCP/Specialist, Assisted patient in the scheduling of an appointment, Addressed a knowledge deficit  General Education: Falls/Home safety, Diabetes       Reviewed signs/symptoms of worsening condition or complication that necessitate a call to the Physician's office.  Educated patient on access to care.  RN phone number given for future care management.    Mallory Orellana RN

## 2023-05-24 ENCOUNTER — PATIENT OUTREACH (OUTPATIENT)
Dept: CASE MANAGEMENT | Facility: CLINIC | Age: 73
End: 2023-05-24
Payer: MEDICARE

## 2023-05-24 NOTE — PROGRESS NOTES
ACM: Patient outreach with Spouse to clarify Mitchell's medication changes. She read back to me the correct of dosage of his Bumex 2mg in morning and 1 mg in the afternoon.  Noted Meds that are DC'd and the decrease in his Humalog to 10 Units BID.  She is calling Podiatry today to schedule follow up for his left foot wounds. Both Cardiology and Endocrinology as well.  Provided contact of a new Nephrologist(seen in [facility]) that he needs to follow up.  Renu frazier visiting nurse is planning to visit him today.  She will direct and re-emphasize above changes as discussed.   Left heel wound will be assessed.  A PCP follow up appt. Is scheduled for 5/30.          Mallory Orellana RN  Ambulatory Care Manager

## 2023-05-26 ENCOUNTER — TELEPHONE (OUTPATIENT)
Dept: FAMILY MEDICINE | Facility: CLINIC | Age: 73
End: 2023-05-26
Payer: MEDICARE

## 2023-05-26 NOTE — TELEPHONE ENCOUNTER
[facility] home called reguarding question about his lisinopril and wants deshawn on his dosage. He has 10mg tablet and looked like he was switched to 2.5mg daily.   Eliz states that is he is suppose to be now taking the 2.5mg he will need a new script   Recommend to call wife back   [phone]

## 2023-05-30 ENCOUNTER — TELEPHONE (OUTPATIENT)
Dept: FAMILY MEDICINE | Facility: CLINIC | Age: 73
End: 2023-05-30

## 2023-05-30 ENCOUNTER — OFFICE VISIT (OUTPATIENT)
Dept: FAMILY MEDICINE | Facility: CLINIC | Age: 73
End: 2023-05-30
Payer: MEDICARE

## 2023-05-30 VITALS
HEIGHT: 72 IN | BODY MASS INDEX: 43.4 KG/M2 | RESPIRATION RATE: 18 BRPM | OXYGEN SATURATION: 94 % | HEART RATE: 82 BPM | TEMPERATURE: 98.7 F | DIASTOLIC BLOOD PRESSURE: 74 MMHG | SYSTOLIC BLOOD PRESSURE: 134 MMHG

## 2023-05-30 DIAGNOSIS — E13.40 OTHER SPECIFIED DIABETES MELLITUS WITH DIABETIC NEUROPATHY, UNSPECIFIED (CMS/HCC): ICD-10-CM

## 2023-05-30 DIAGNOSIS — N18.4 CHRONIC KIDNEY DISEASE, STAGE 4 (SEVERE) (CMS/HCC): ICD-10-CM

## 2023-05-30 DIAGNOSIS — L97.428 DIABETIC ULCER OF LEFT HEEL ASSOCIATED WITH TYPE 2 DIABETES MELLITUS, WITH OTHER ULCER SEVERITY: ICD-10-CM

## 2023-05-30 DIAGNOSIS — Z79.4 LONG-TERM INSULIN USE (CMS/HCC): ICD-10-CM

## 2023-05-30 DIAGNOSIS — E11.621 DIABETIC ULCER OF LEFT HEEL ASSOCIATED WITH TYPE 2 DIABETES MELLITUS, WITH OTHER ULCER SEVERITY: ICD-10-CM

## 2023-05-30 DIAGNOSIS — I50.22 CHRONIC SYSTOLIC CONGESTIVE HEART FAILURE (CMS/HCC): Primary | ICD-10-CM

## 2023-05-30 PROBLEM — L97.429 DIABETIC ULCER OF LEFT HEEL ASSOCIATED WITH TYPE 2 DIABETES MELLITUS (CMS/HCC): Status: ACTIVE | Noted: 2023-05-30

## 2023-05-30 PROCEDURE — G8752 SYS BP LESS 140: HCPCS | Performed by: FAMILY MEDICINE

## 2023-05-30 PROCEDURE — 99214 OFFICE O/P EST MOD 30 MIN: CPT | Performed by: FAMILY MEDICINE

## 2023-05-30 PROCEDURE — G8754 DIAS BP LESS 90: HCPCS | Performed by: FAMILY MEDICINE

## 2023-05-30 RX ORDER — FLASH GLUCOSE SCANNING READER
EACH MISCELLANEOUS
Qty: 1 EACH | Refills: 0 | Status: SHIPPED | OUTPATIENT
Start: 2023-05-30

## 2023-05-30 RX ORDER — FLASH GLUCOSE SENSOR
KIT MISCELLANEOUS
Qty: 6 KIT | Refills: 1 | Status: SHIPPED | OUTPATIENT
Start: 2023-05-30

## 2023-05-30 RX ORDER — BUMETANIDE 1 MG/1
1 TABLET ORAL 3 TIMES DAILY
COMMUNITY
End: 2023-09-13 | Stop reason: HOSPADM

## 2023-05-30 NOTE — ASSESSMENT & PLAN NOTE
Discussed in detail with him and his wife  To do have an appointment scheduled with the nephrologist  Advised good diabetic control is key to help prevent progression

## 2023-05-30 NOTE — ASSESSMENT & PLAN NOTE
Reviewed hospital records  The dosing of his Bumex was changed as he was noncompliant with previous dosing  He does have a follow-up visit scheduled with his cardiologist  Asymptomatic at this time

## 2023-05-30 NOTE — PATIENT INSTRUCTIONS
Freestyle caio 2 sent to the pharmacy     Keep your appointments with the cardiologist, endocrinologist, nephrologist, wound care     Follow up in 4 month

## 2023-05-30 NOTE — PROGRESS NOTES
Subjective      Patient ID: Mitchell Sauceda is a 73 y.o. male.    CC: Hospital F/U      -here with his wife today  -was admitted to [facility] from 5/17-5/21 with hypoglycemia and CHF exacerbation   -has home nursing, PT, OT   -has home health aide 2 days a week   -his wife is doing much better so she is more able to take care of him at home     CHF   -his bumex was reduced in the hospital   -does have a follow up with his cardiologist schedule   -taking his medication as directed  -denies SOB, chest pain, palpitations   -edema is at baseline     Foot Ulcer   -he is seeing Dr. Henson tomorrow for the first time as outpatient   -has home visiting nurse to help with dressing changes    DM   -noted to have hypoglycemia upon admission  -appointment with endo scheduled for June   -blood sugars have been starting to be high again      CKD4  -he does have an appointment with the nephrologist in July        Allergies  Meds  Problems       Review of Systems    As above    Allergies   Allergen Reactions   • Vancomycin    • Prednisone Hives     Other reaction(s): rash/hives     Current Outpatient Medications   Medication Sig Dispense Refill   • ascorbic acid (VITAMIN C) 500 mg tablet Take 1 tablet by mouth daily.     • atorvastatin (LIPITOR) 20 mg tablet Take 1 tablet (20 mg total) by mouth daily. 90 tablet 3   • bran-gum-fib-gerard-psyl-kelp-pec 1,000 mg tablet Take 2 capsules by mouth daily.     • bumetanide (BUMEX) 1 mg tablet Take 1 mg by mouth daily. In the afternoon     • bumetanide (BUMEX) 2 mg tablet Take 2 mg by mouth daily.     • cetirizine (ZyrTEC) 10 mg tablet Take 10 mg by mouth daily.     • cholecalciferol, vitamin D3, 25 mcg (1,000 unit) capsule one capsule daily     • cyanocobalamin, vitamin B-12, (VITAMIN B-12 ORAL) Take 1 tablet by mouth daily.     • diclofenac sodium (VOLTAREN) 1 % topical gel 1 %.     • ferrous sulfate 325 mg (65 mg iron) EC tablet Take 1 tablet by mouth every other day.       •  flash glucose scanning reader (FREESTYLE BURT 2 READER) [facility] Check BG as directed. Change every 2 weeks. DX: Z79.4, E 13.40 1 each 0   • flash glucose sensor (FREESTYLE BURT 2 SENSOR) kit Check BG as directed DX: Z79.4, E 13.40 6 kit 1   • gabapentin (NEURONTIN) 300 mg capsule Take 300 mg by mouth 3 (three) times a day.     • insulin lispro protamin/lispro (HUMALOG MIX 75-25 KWIKPEN SUBQ) Inject 10 Units under the skin 2 (two) times a day.     • KLOR-CON M20 20 mEq CR tablet TAKE 1 TABLET BY MOUTH 2 TIMES A DAY. 180 tablet 0   • lancets misc for blood glucose monitoring 3x day     • lisinopril (PRINIVIL) 2.5 mg tablet Take 1 tablet (2.5 mg total) by mouth daily. 90 tablet 3   • oxyCODONE (OXY-IR) 5 mg capsule 5 mg 3 (three) times a day.       • pantoprazole (PROTONIX) 40 mg EC tablet Take 1 tablet (40 mg total) by mouth daily. 90 tablet 0   • PEN NEEDLE, DIABETIC (BD ULTRA-FINE MICRO PEN NEEDLE MISC) once daily use with Levemir     • polycarbophil (FIBERCON) 625 mg tablet Take 2 capsules by mouth daily.     • polyethylene glycol (MIRALAX) 17 gram/dose powder take (17G)  by oral route  every day mixed with 8 oz. water, juice, soda, coffee or tea     • rivaroxaban (XARELTO) 20 mg tablet Take 1 tablet (20 mg total) by mouth daily with dinner. 90 tablet 3   • semaglutide (OZEMPIC) 1 mg/dose (2 mg/1.5 mL) subcutaneous injection Inject 1 mg under the skin every (seven) 7 days.     • sildenafil (VIAGRA) 100 mg tablet   0     No current facility-administered medications for this visit.     Past Medical History:   Diagnosis Date   • Arthritis    • GERD (gastroesophageal reflux disease)    • Hypertension    • Lipid disorder    • Neuropathy    • Persistent atrial fibrillation (CMS/HCC) 3/19/2019   • Primary osteoarthritis involving multiple joints 5/15/2019   • Pulmonary embolism (CMS/HCC)    • Type 2 diabetes mellitus (CMS/HCC)    • Type 2 diabetes mellitus with hypoglycemia (CMS/HCC) 10/24/2013    Overview:      Past  Surgical History:   Procedure Laterality Date   • KNEE ARTHROSCOPY Bilateral    • VEIN SURGERY Right      Social History     Tobacco Use   • Smoking status: Never   • Smokeless tobacco: Never   Substance Use Topics   • Alcohol use: No     Family History   Problem Relation Age of Onset   • Factor V Leiden deficiency Biological Sister    • Factor V Leiden deficiency Nephew        Objective   Vitals:    05/30/23 1331   BP: 134/74   Pulse: 82   Resp: 18   Temp: 37.1 °C (98.7 °F)   SpO2: 94%   Height: 1.829 m (6')    Body mass index is 43.4 kg/m².  Physical Exam  Vitals reviewed.   Cardiovascular:      Rate and Rhythm: Normal rate and regular rhythm.      Heart sounds: No murmur heard.     No friction rub. No gallop.      Comments: Massive edema bilaterally  Pulmonary:      Effort: Pulmonary effort is normal.      Breath sounds: Normal breath sounds. No wheezing, rhonchi or rales.   Musculoskeletal:      Right lower leg: Edema present.      Left lower leg: Edema present.   Feet:      Comments: Left foot dressed cell ulcer was not reviewed as he will be seeing wound care  Neurological:      Mental Status: He is alert and oriented to person, place, and time.   Psychiatric:         Mood and Affect: Mood and affect normal.         Assessment/Plan   Diagnoses and all orders for this visit:    Chronic systolic congestive heart failure (CMS/HCC) (Primary)  Assessment & Plan:  Reviewed hospital records  The dosing of his Bumex was changed as he was noncompliant with previous dosing  He does have a follow-up visit scheduled with his cardiologist  Asymptomatic at this time      Other specified diabetes mellitus with diabetic neuropathy, unspecified (CMS/HCC)  Assessment & Plan:  Had episode of hypoglycemia  He does have a follow-up with his endocrinologist scheduled  Recommend freestyle caio 2 for continuous glucose monitoring      Long-term insulin use (CMS/[facility])  Assessment & Plan:  Per his endocrinologist      Diabetic ulcer of  left heel associated with type 2 diabetes mellitus, with other ulcer severity (CMS/[facility])  Assessment & Plan:  He is seeing wound care  Does have home nursing as well  Continue monitoring      Chronic kidney disease, stage 4 (severe) (CMS/[facility])  Assessment & Plan:  Discussed in detail with him and his wife  To do have an appointment scheduled with the nephrologist  Advised good diabetic control is key to help prevent progression      Other orders  -     flash glucose sensor (FREESTYLE BURT 2 SENSOR) kit; Check BG as directed DX: Z79.4, E 13.40  -     flash glucose scanning reader (FREESTYLE BURT 2 READER) misc; Check BG as directed. Change every 2 weeks. DX: Z79.4, E 13.40             Richard Ojeda,     This note was created with voice recognition software. Inadvertent dictation errors should be disregarded. Please contact my office for clarification.

## 2023-05-30 NOTE — ASSESSMENT & PLAN NOTE
Had episode of hypoglycemia  He does have a follow-up with his endocrinologist scheduled  Recommend freestyle caio 2 for continuous glucose monitoring

## 2023-06-02 ENCOUNTER — PATIENT OUTREACH (OUTPATIENT)
Dept: CASE MANAGEMENT | Facility: CLINIC | Age: 73
End: 2023-06-02
Payer: MEDICARE

## 2023-06-02 NOTE — PROGRESS NOTES
"ACM: Patient outreach follow up call regarding previous hospital  Admission. Renu spouse reports, \" he is doing well.\"  He is participating with physical therapy. They went to the Podiatrist, saw Dr. Smith for his left wound on his heel.  Hd his f/u appt. With PCP and is checking his blood sugars daily. Renu stated they are starting to get on the \"high side\" & verbalized understanding of improved appetite will increase his blood sugars. Aware of s/s hypo vs. Hyperglycemia.  No further questions or concerns at this time.  Has Cardiology f/u appt later today.                Mallory Orellana RN  Ambulatory Care Manager      "

## 2023-06-12 RX ORDER — GABAPENTIN 300 MG/1
300 CAPSULE ORAL 2 TIMES DAILY
Qty: 180 CAPSULE | Refills: 0 | Status: SHIPPED | OUTPATIENT
Start: 2023-06-12 | End: 2023-08-23

## 2023-06-12 NOTE — TELEPHONE ENCOUNTER
Medicine Refill Request    Last Office: 5/30/2023   Last Consult Visit: Visit date not found  Last Telemedicine Visit: 12/21/2021 Aziza Randolph,     Next Appointment: 8/15/2023      Current Outpatient Medications:   •  ascorbic acid (VITAMIN C) 500 mg tablet, Take 1 tablet by mouth daily., Disp: , Rfl:   •  atorvastatin (LIPITOR) 20 mg tablet, Take 1 tablet (20 mg total) by mouth daily., Disp: 90 tablet, Rfl: 3  •  bran-gum-fib-gerard-psyl-kelp-pec 1,000 mg tablet, Take 2 capsules by mouth daily., Disp: , Rfl:   •  bumetanide (BUMEX) 1 mg tablet, Take 1 mg by mouth daily. In the afternoon, Disp: , Rfl:   •  bumetanide (BUMEX) 2 mg tablet, Take 2 mg by mouth daily., Disp: , Rfl:   •  cetirizine (ZyrTEC) 10 mg tablet, Take 10 mg by mouth daily., Disp: , Rfl:   •  cholecalciferol, vitamin D3, 25 mcg (1,000 unit) capsule, one capsule daily, Disp: , Rfl:   •  cyanocobalamin, vitamin B-12, (VITAMIN B-12 ORAL), Take 1 tablet by mouth daily., Disp: , Rfl:   •  diclofenac sodium (VOLTAREN) 1 % topical gel, 1 %., Disp: , Rfl:   •  ferrous sulfate 325 mg (65 mg iron) EC tablet, Take 1 tablet by mouth every other day.  , Disp: , Rfl:   •  flash glucose scanning reader (FREESTYLE BURT 2 READER) [facility] [place], Check BG as directed. Change every 2 weeks. DX: Z79.4, E 13.40, Disp: 1 each, Rfl: 0  •  flash glucose sensor (FREESTYLE BURT 2 SENSOR) kit, Check BG as directed DX: Z79.4, E 13.40, Disp: 6 kit, Rfl: 1  •  gabapentin (NEURONTIN) 300 mg capsule, Take 300 mg by mouth 3 (three) times a day., Disp: , Rfl:   •  insulin lispro protamin/lispro (HUMALOG MIX 75-25 KWIKPEN SUBQ), Inject 10 Units under the skin 2 (two) times a day., Disp: , Rfl:   •  KLOR-CON M20 20 mEq CR tablet, TAKE 1 TABLET BY MOUTH 2 TIMES A DAY., Disp: 180 tablet, Rfl: 0  •  lancets misc, for blood glucose monitoring 3x day, Disp: , Rfl:   •  lisinopril (PRINIVIL) 2.5 mg tablet, Take 1 tablet (2.5 mg total) by mouth daily., Disp: 90 tablet, Rfl: 3  •  oxyCODONE  (OXY-IR) 5 mg capsule, 5 mg 3 (three) times a day.  , Disp: , Rfl:   •  pantoprazole (PROTONIX) 40 mg EC tablet, Take 1 tablet (40 mg total) by mouth daily., Disp: 90 tablet, Rfl: 0  •  PEN NEEDLE, DIABETIC (BD ULTRA-FINE MICRO PEN NEEDLE MISC), once daily use with Levemir, Disp: , Rfl:   •  polycarbophil (FIBERCON) 625 mg tablet, Take 2 capsules by mouth daily., Disp: , Rfl:   •  polyethylene glycol (MIRALAX) 17 gram/dose powder, take (17G)  by oral route  every day mixed with 8 oz. water, juice, soda, coffee or tea, Disp: , Rfl:   •  rivaroxaban (XARELTO) 20 mg tablet, Take 1 tablet (20 mg total) by mouth daily with dinner., Disp: 90 tablet, Rfl: 3  •  semaglutide (OZEMPIC) 1 mg/dose (2 mg/1.5 mL) subcutaneous injection, Inject 1 mg under the skin every (seven) 7 days., Disp: , Rfl:   •  sildenafil (VIAGRA) 100 mg tablet, , Disp: , Rfl: 0      BP Readings from Last 3 Encounters:   05/30/23 134/74   02/15/23 124/76   01/31/22 138/72       Recent Lab results:  Lab Results   Component Value Date    CHOL 137 06/13/2019   ,   Lab Results   Component Value Date    HDL 41 06/13/2019   ,   Lab Results   Component Value Date    LDLCALC 78 06/13/2019   ,   Lab Results   Component Value Date    TRIG 99 06/13/2019        Lab Results   Component Value Date    GLUCOSE 387 (H) 01/31/2022   ,   Lab Results   Component Value Date    HGBA1C 7.4 05/19/2023         Lab Results   Component Value Date    CREATININE 1.26 (H) 01/31/2022       Lab Results   Component Value Date    TSH 2.49 07/10/2018

## 2023-06-15 RX ORDER — GABAPENTIN 300 MG/1
300 CAPSULE ORAL 2 TIMES DAILY
Qty: 180 CAPSULE | Refills: 0 | OUTPATIENT
Start: 2023-06-15

## 2023-06-15 NOTE — TELEPHONE ENCOUNTER
Medicine Refill Request    Last Office: 5/30/2023   Last Consult Visit: Visit date not found  Last Telemedicine Visit: 12/21/2021 Aziza Randolph,     Next Appointment: 8/15/2023      Current Outpatient Medications:   •  ascorbic acid (VITAMIN C) 500 mg tablet, Take 1 tablet by mouth daily., Disp: , Rfl:   •  atorvastatin (LIPITOR) 20 mg tablet, Take 1 tablet (20 mg total) by mouth daily., Disp: 90 tablet, Rfl: 3  •  bran-gum-fib-gerard-psyl-kelp-pec 1,000 mg tablet, Take 2 capsules by mouth daily., Disp: , Rfl:   •  bumetanide (BUMEX) 1 mg tablet, Take 1 mg by mouth daily. In the afternoon, Disp: , Rfl:   •  bumetanide (BUMEX) 2 mg tablet, Take 2 mg by mouth daily., Disp: , Rfl:   •  cetirizine (ZyrTEC) 10 mg tablet, Take 10 mg by mouth daily., Disp: , Rfl:   •  cholecalciferol, vitamin D3, 25 mcg (1,000 unit) capsule, one capsule daily, Disp: , Rfl:   •  cyanocobalamin, vitamin B-12, (VITAMIN B-12 ORAL), Take 1 tablet by mouth daily., Disp: , Rfl:   •  diclofenac sodium (VOLTAREN) 1 % topical gel, 1 %., Disp: , Rfl:   •  ferrous sulfate 325 mg (65 mg iron) EC tablet, Take 1 tablet by mouth every other day.  , Disp: , Rfl:   •  flash glucose scanning reader (FREESTYLE BURT 2 READER) [facility] [place] as directed. Change every 2 weeks. DX: Z79.4, E 13.40, Disp: 1 each, Rfl: 0  •  flash glucose sensor (FREESTYLE BURT 2 SENSOR) kit, Check BG as directed DX: Z79.4, E 13.40, Disp: 6 kit, Rfl: 1  •  gabapentin (NEURONTIN) 300 mg capsule, TAKE 1 CAPSULE BY MOUTH TWICE A DAY, Disp: 180 capsule, Rfl: 0  •  insulin lispro protamin/lispro (HUMALOG MIX 75-25 KWIKPEN SUBQ), Inject 10 Units under the skin 2 (two) times a day., Disp: , Rfl:   •  KLOR-CON M20 20 mEq CR tablet, TAKE 1 TABLET BY MOUTH 2 TIMES A DAY., Disp: 180 tablet, Rfl: 0  •  lancets misc, for blood glucose monitoring 3x day, Disp: , Rfl:   •  lisinopril (PRINIVIL) 2.5 mg tablet, Take 1 tablet (2.5 mg total) by mouth daily., Disp: 90 tablet, Rfl: 3  •  oxyCODONE  (OXY-IR) 5 mg capsule, 5 mg 3 (three) times a day.  , Disp: , Rfl:   •  pantoprazole (PROTONIX) 40 mg EC tablet, Take 1 tablet (40 mg total) by mouth daily., Disp: 90 tablet, Rfl: 0  •  PEN NEEDLE, DIABETIC (BD ULTRA-FINE MICRO PEN NEEDLE MISC), once daily use with Levemir, Disp: , Rfl:   •  polycarbophil (FIBERCON) 625 mg tablet, Take 2 capsules by mouth daily., Disp: , Rfl:   •  polyethylene glycol (MIRALAX) 17 gram/dose powder, take (17G)  by oral route  every day mixed with 8 oz. water, juice, soda, coffee or tea, Disp: , Rfl:   •  rivaroxaban (XARELTO) 20 mg tablet, Take 1 tablet (20 mg total) by mouth daily with dinner., Disp: 90 tablet, Rfl: 3  •  semaglutide (OZEMPIC) 1 mg/dose (2 mg/1.5 mL) subcutaneous injection, Inject 1 mg under the skin every (seven) 7 days., Disp: , Rfl:   •  sildenafil (VIAGRA) 100 mg tablet, , Disp: , Rfl: 0      BP Readings from Last 3 Encounters:   05/30/23 134/74   02/15/23 124/76   01/31/22 138/72       Recent Lab results:  Lab Results   Component Value Date    CHOL 137 06/13/2019   ,   Lab Results   Component Value Date    HDL 41 06/13/2019   ,   Lab Results   Component Value Date    LDLCALC 78 06/13/2019   ,   Lab Results   Component Value Date    TRIG 99 06/13/2019        Lab Results   Component Value Date    GLUCOSE 387 (H) 01/31/2022   ,   Lab Results   Component Value Date    HGBA1C 7.4 05/19/2023         Lab Results   Component Value Date    CREATININE 1.26 (H) 01/31/2022       Lab Results   Component Value Date    TSH 2.49 07/10/2018

## 2023-06-16 RX ORDER — PANTOPRAZOLE SODIUM 40 MG/1
40 TABLET, DELAYED RELEASE ORAL DAILY
Qty: 90 TABLET | Refills: 0 | Status: SHIPPED | OUTPATIENT
Start: 2023-06-16 | End: 2023-09-28

## 2023-06-16 NOTE — TELEPHONE ENCOUNTER
Medicine Refill Request    Last Office: 5/30/2023   Last Consult Visit: Visit date not found  Last Telemedicine Visit: 12/21/2021 Aziza Randolph,     Next Appointment: 8/15/2023      Current Outpatient Medications:   •  ascorbic acid (VITAMIN C) 500 mg tablet, Take 1 tablet by mouth daily., Disp: , Rfl:   •  atorvastatin (LIPITOR) 20 mg tablet, Take 1 tablet (20 mg total) by mouth daily., Disp: 90 tablet, Rfl: 3  •  bran-gum-fib-gerard-psyl-kelp-pec 1,000 mg tablet, Take 2 capsules by mouth daily., Disp: , Rfl:   •  bumetanide (BUMEX) 1 mg tablet, Take 1 mg by mouth daily. In the afternoon, Disp: , Rfl:   •  bumetanide (BUMEX) 2 mg tablet, Take 2 mg by mouth daily., Disp: , Rfl:   •  cetirizine (ZyrTEC) 10 mg tablet, Take 10 mg by mouth daily., Disp: , Rfl:   •  cholecalciferol, vitamin D3, 25 mcg (1,000 unit) capsule, one capsule daily, Disp: , Rfl:   •  cyanocobalamin, vitamin B-12, (VITAMIN B-12 ORAL), Take 1 tablet by mouth daily., Disp: , Rfl:   •  diclofenac sodium (VOLTAREN) 1 % topical gel, 1 %., Disp: , Rfl:   •  ferrous sulfate 325 mg (65 mg iron) EC tablet, Take 1 tablet by mouth every other day.  , Disp: , Rfl:   •  flash glucose scanning reader (FREESTYLE BURT 2 READER) [facility] [place], Check BG as directed. Change every 2 weeks. DX: Z79.4, E 13.40, Disp: 1 each, Rfl: 0  •  flash glucose sensor (FREESTYLE BURT 2 SENSOR) kit, Check BG as directed DX: Z79.4, E 13.40, Disp: 6 kit, Rfl: 1  •  gabapentin (NEURONTIN) 300 mg capsule, TAKE 1 CAPSULE BY MOUTH TWICE A DAY, Disp: 180 capsule, Rfl: 0  •  insulin lispro protamin/lispro (HUMALOG MIX 75-25 KWIKPEN SUBQ), Inject 10 Units under the skin 2 (two) times a day., Disp: , Rfl:   •  KLOR-CON M20 20 mEq CR tablet, TAKE 1 TABLET BY MOUTH 2 TIMES A DAY., Disp: 180 tablet, Rfl: 0  •  lancets misc, for blood glucose monitoring 3x day, Disp: , Rfl:   •  lisinopril (PRINIVIL) 2.5 mg tablet, Take 1 tablet (2.5 mg total) by mouth daily., Disp: 90 tablet, Rfl: 3  •  oxyCODONE  (OXY-IR) 5 mg capsule, 5 mg 3 (three) times a day.  , Disp: , Rfl:   •  pantoprazole (PROTONIX) 40 mg EC tablet, Take 1 tablet (40 mg total) by mouth daily., Disp: 90 tablet, Rfl: 0  •  PEN NEEDLE, DIABETIC (BD ULTRA-FINE MICRO PEN NEEDLE MISC), once daily use with Levemir, Disp: , Rfl:   •  polycarbophil (FIBERCON) 625 mg tablet, Take 2 capsules by mouth daily., Disp: , Rfl:   •  polyethylene glycol (MIRALAX) 17 gram/dose powder, take (17G)  by oral route  every day mixed with 8 oz. water, juice, soda, coffee or tea, Disp: , Rfl:   •  rivaroxaban (XARELTO) 20 mg tablet, Take 1 tablet (20 mg total) by mouth daily with dinner., Disp: 90 tablet, Rfl: 3  •  semaglutide (OZEMPIC) 1 mg/dose (2 mg/1.5 mL) subcutaneous injection, Inject 1 mg under the skin every (seven) 7 days., Disp: , Rfl:   •  sildenafil (VIAGRA) 100 mg tablet, , Disp: , Rfl: 0      BP Readings from Last 3 Encounters:   05/30/23 134/74   02/15/23 124/76   01/31/22 138/72       Recent Lab results:  Lab Results   Component Value Date    CHOL 137 06/13/2019   ,   Lab Results   Component Value Date    HDL 41 06/13/2019   ,   Lab Results   Component Value Date    LDLCALC 78 06/13/2019   ,   Lab Results   Component Value Date    TRIG 99 06/13/2019        Lab Results   Component Value Date    GLUCOSE 387 (H) 01/31/2022   ,   Lab Results   Component Value Date    HGBA1C 7.4 05/19/2023         Lab Results   Component Value Date    CREATININE 1.26 (H) 01/31/2022       Lab Results   Component Value Date    TSH 2.49 07/10/2018

## 2023-07-04 ENCOUNTER — EXTERNAL RECORD (OUTPATIENT)
Dept: HEALTH INFORMATION MANAGEMENT | Facility: OTHER | Age: 73
End: 2023-07-04

## 2023-07-18 ENCOUNTER — PATIENT OUTREACH (OUTPATIENT)
Dept: CASE MANAGEMENT | Facility: CLINIC | Age: 73
End: 2023-07-18
Payer: MEDICARE

## 2023-07-18 ENCOUNTER — TELEPHONE (OUTPATIENT)
Dept: FAMILY MEDICINE | Facility: CLINIC | Age: 73
End: 2023-07-18

## 2023-07-18 ASSESSMENT — ENCOUNTER SYMPTOMS
WEAKNESS: 1
CHILLS: 0
ACTIVITY CHANGE: 1
DIAPHORESIS: 0
APPETITE CHANGE: 0
FATIGUE: 1
WOUND: 1

## 2023-07-18 NOTE — PROGRESS NOTES
NAME: Mitchell Sauceda    MRN: [id number]    YOB: 1950    Event Review:    Initial TCM Patient Outreach Date: 07/18/23       Patient stated reason for hospitalization: Altered Mental Status  Discharge Diagnosis: Sepsis, Decubitis of left heel. I & D-podiatry    Patient readmitted in the last 30 days: No  Discharging Facility: [facility]  Date of Last Admission: 07/08/23  Date of Last Discharge: 07/16/23    Discharging Facilty SNF/Rehab: None   Patient's perception of their health status since discharge: Improving    HPI:Spoke with Spouse today as Patient was resting.  Daughter is there with them, visitingng from [place].  Patient became lethargic, with weakness. Sent to [facility] ER where he was found to have Sepsis from Left heel wound.  Incision and drainage performed.  ID consulted. Placed on IV Unasyn.   He transitioned to oral Augmentin and was evaluated by PT/OT.  SNF recommended-he and spouse declined.  DC to home with Home care. Nursing for wound care and   Physical therapy.  Spoke with spouse who stated Mitchell takes his own meds. He was resting at this time. Visiting nurse/PT in home visits have started.   He is using his walker and can bear weight on left heel as tolerable.  Podatiry f/u in one week. PCP appt requested.        Review of Systems   Constitutional: Positive for activity change and fatigue. Negative for appetite change, chills and diaphoresis.   Skin: Positive for wound.   Neurological: Positive for weakness.       Medication Review:    Medication Review: No  If No, please explain: Pt reviewed with Visiting Nurse, Pt unable  Reported by: Spouse  Any new medications prescribed at discharge?: Yes  Is the patient having any side effects they believe may be caused by any medication additions or changes?: No  New prescriptions filled?: Yes     Do you have enough of your regularly prescribed medications?: Yes       Medication adherence problem?: No  Was a  medication discrepancy indentified?: No       Nursing Interventions: No intervention needed  Reconciled the current and discharge medications: Yes  Reviewed AVS (Discharge Instructions)?: Yes         Acute Pain:    Acute pain: No         Chronic Pain:    Chronic pain: No   Diet/Nutrition:    Type of Diet: Diabetic  Diet Adherence: Adherent with diet      Home Care Services:    Home Care Agency: Other ([facility])  Type of Home Care Services: Home PT, Home Nursing  Home Care Interventions: No intervention required     Post-Discharge Durable Medical Equipment::    Durable Medical Equipment: Front wheeled walker  Oxygen Use: No           Has all Durable Medical Equipment (DME) been delivered?: Yes  DME Interventions: No intervention required    Home Management:    Living Arrangement: Spouse  Support System:: Family  Type of Residence: [address] [place] house  Home Monitoring: Blood Sugars  Any patient reported falls in the last 3 months?: No  Jehovah's witness or spiritual beliefs that impact treatment?: No    Appointment Scheduling:    PCP appointment scheduled:  (Called office Appt. pending.)      Patient Scheduling Dispositions: Fam/care giver will call to schedule     Follow-Ups:       Relevant Specialist Follow-ups: Podiatrist    Interventions/ Care Coordination:    Interventions/ Care Coordination: Addressed a knowledge deficit, Encouraged patient to schedule with the PCP/Specialist  General Education: Falls/Home safety, Warm weather precautions       Reviewed signs/symptoms of worsening condition or complication that necessitate a call to the Physician's office.  Educated patient on access to care.  RN phone number given for future care management.    Mallory Orellana RN

## 2023-07-18 NOTE — TELEPHONE ENCOUNTER
Pt was discharged from [facility] on Sunday July 16 for left foot issues and needs a HFU appt.  Please call pt's wife Renu at [phone].      [facility] Appointment Request   Provider: Dr. Ojeda  Appointment Type: HFU  Reason for Visit:   Available Day and Time:   Best Contact Number: [phone]    The practice will reach out to schedule your appointment within the next 2 business days.

## 2023-07-19 ENCOUNTER — TELEPHONE (OUTPATIENT)
Dept: FAMILY MEDICINE | Facility: CLINIC | Age: 73
End: 2023-07-19
Payer: MEDICARE

## 2023-07-19 NOTE — TELEPHONE ENCOUNTER
Patient had to cancel HFU due to conflicting appt with foot doctor. Patient needs to be seen by 7/31 and currently, Dr. Ojeda has no other availability by that date and neither do the other providers. Please call patient to discuss.    [facility] Appointment Request   Provider: Dr. Ojeda  Appointment Type: HFU  [facility]  Available Day and Time: any day during week in afternoon, if on 7/25 after 12pm and needs to be within two weeks from being in hospital (before 7/31)  Best Contact Number: [phone]    The practice will reach out to schedule your appointment within the next 2 business days.

## 2023-07-19 NOTE — TELEPHONE ENCOUNTER
Pt has foot doctor appt on the 25th July. Cancelled appt here - he currently has a pre scheduled 6 month follow up appt here on 8-15-23. Hospitalized for foot issues. Is the 8-15 appt soon enough?

## 2023-08-22 ENCOUNTER — PATIENT OUTREACH (OUTPATIENT)
Dept: CASE MANAGEMENT | Facility: CLINIC | Age: 73
End: 2023-08-22
Payer: MEDICARE

## 2023-08-22 NOTE — TELEPHONE ENCOUNTER
Medicine Refill Request    Last Office Visit: 5/30/2023   Last Consult Visit: Visit date not found  Last Telemedicine Visit: 12/21/2021 Aziza Randolph,     Next Appointment: 9/25/2023      Current Outpatient Medications:   •  ascorbic acid (VITAMIN C) 500 mg tablet, Take 1 tablet by mouth daily., Disp: , Rfl:   •  atorvastatin (LIPITOR) 20 mg tablet, Take 1 tablet (20 mg total) by mouth daily., Disp: 90 tablet, Rfl: 3  •  bran-gum-fib-gerard-psyl-kelp-pec 1,000 mg tablet, Take 2 capsules by mouth daily., Disp: , Rfl:   •  bumetanide (BUMEX) 1 mg tablet, Take 1 mg by mouth daily. In the afternoon, Disp: , Rfl:   •  bumetanide (BUMEX) 2 mg tablet, Take 2 mg by mouth daily., Disp: , Rfl:   •  cetirizine (ZyrTEC) 10 mg tablet, Take 10 mg by mouth daily., Disp: , Rfl:   •  cholecalciferol, vitamin D3, 25 mcg (1,000 unit) capsule, one capsule daily, Disp: , Rfl:   •  cyanocobalamin, vitamin B-12, (VITAMIN B-12 ORAL), Take 1 tablet by mouth daily., Disp: , Rfl:   •  diclofenac sodium (VOLTAREN) 1 % topical gel, 1 %., Disp: , Rfl:   •  ferrous sulfate 325 mg (65 mg iron) EC tablet, Take 1 tablet by mouth every other day.  , Disp: , Rfl:   •  flash glucose scanning reader (FREESTYLE BURT 2 READER) [facility] [place] as directed. Change every 2 weeks. DX: Z79.4, E 13.40, Disp: 1 each, Rfl: 0  •  flash glucose sensor (FREESTYLE BURT 2 SENSOR) kit, Check BG as directed DX: Z79.4, E 13.40, Disp: 6 kit, Rfl: 1  •  gabapentin (NEURONTIN) 300 mg capsule, TAKE 1 CAPSULE BY MOUTH TWICE A DAY, Disp: 180 capsule, Rfl: 0  •  insulin lispro protamin/lispro (HUMALOG MIX 75-25 KWIKPEN SUBQ), Inject 10 Units under the skin 2 (two) times a day., Disp: , Rfl:   •  KLOR-CON M20 20 mEq CR tablet, TAKE 1 TABLET BY MOUTH 2 TIMES A DAY., Disp: 180 tablet, Rfl: 0  •  lancets misc, for blood glucose monitoring 3x day, Disp: , Rfl:   •  lisinopril (PRINIVIL) 2.5 mg tablet, Take 1 tablet (2.5 mg total) by mouth daily., Disp: 90 tablet, Rfl: 3  •   oxyCODONE (OXY-IR) 5 mg capsule, 5 mg 3 (three) times a day.  , Disp: , Rfl:   •  pantoprazole (PROTONIX) 40 mg EC tablet, Take 1 tablet (40 mg total) by mouth daily., Disp: 90 tablet, Rfl: 0  •  PEN NEEDLE, DIABETIC (BD ULTRA-FINE MICRO PEN NEEDLE MISC), once daily use with Levemir, Disp: , Rfl:   •  polycarbophil (FIBERCON) 625 mg tablet, Take 2 capsules by mouth daily., Disp: , Rfl:   •  polyethylene glycol (MIRALAX) 17 gram/dose powder, take (17G)  by oral route  every day mixed with 8 oz. water, juice, soda, coffee or tea, Disp: , Rfl:   •  rivaroxaban (XARELTO) 20 mg tablet, Take 1 tablet (20 mg total) by mouth daily with dinner., Disp: 90 tablet, Rfl: 3  •  semaglutide (OZEMPIC) 1 mg/dose (2 mg/1.5 mL) subcutaneous injection, Inject 1 mg under the skin every (seven) 7 days., Disp: , Rfl:   •  sildenafil (VIAGRA) 100 mg tablet, , Disp: , Rfl: 0      BP Readings from Last 3 Encounters:   05/30/23 134/74   02/15/23 124/76   01/31/22 138/72       Recent Lab results:  Lab Results   Component Value Date    CHOL 137 06/13/2019   ,   Lab Results   Component Value Date    HDL 41 06/13/2019   ,   Lab Results   Component Value Date    LDLCALC 78 06/13/2019   ,   Lab Results   Component Value Date    TRIG 99 06/13/2019        Lab Results   Component Value Date    GLUCOSE 387 (H) 01/31/2022   ,   Lab Results   Component Value Date    HGBA1C 7.4 05/19/2023         Lab Results   Component Value Date    CREATININE 1.26 (H) 01/31/2022       Lab Results   Component Value Date    TSH 2.49 07/10/2018           Lab Results   Component Value Date    HGBA1C 7.4 05/19/2023

## 2023-08-22 NOTE — PROGRESS NOTES
ACM: Patient outreach follow up call today.  Spoke with Renu his spouse who stated his left heel seems to be improving, per the physician. The wound attachment is not working as it should. She has discussed with the Podiatrist and is getting ready to send it back.  They have Lizy back- nurse who assists with care.  Denies any further questions or concerns at this time.                  Mallory Orellana RN  Ambulatory Care Manager

## 2023-08-23 RX ORDER — GABAPENTIN 300 MG/1
300 CAPSULE ORAL 2 TIMES DAILY
Qty: 180 CAPSULE | Refills: 0 | Status: SHIPPED | OUTPATIENT
Start: 2023-08-23 | End: 2023-11-13

## 2023-09-10 ENCOUNTER — APPOINTMENT (EMERGENCY)
Dept: RADIOLOGY | Facility: HOSPITAL | Age: 73
DRG: 683 | End: 2023-09-10
Payer: MEDICARE

## 2023-09-10 ENCOUNTER — APPOINTMENT (INPATIENT)
Dept: RADIOLOGY | Facility: HOSPITAL | Age: 73
DRG: 683 | End: 2023-09-10
Attending: PHYSICAL THERAPIST
Payer: MEDICARE

## 2023-09-10 ENCOUNTER — HOSPITAL ENCOUNTER (INPATIENT)
Facility: HOSPITAL | Age: 73
LOS: 3 days | Discharge: HOME | DRG: 683 | End: 2023-09-13
Attending: EMERGENCY MEDICINE | Admitting: INTERNAL MEDICINE
Payer: MEDICARE

## 2023-09-10 ENCOUNTER — APPOINTMENT (INPATIENT)
Dept: RADIOLOGY | Facility: HOSPITAL | Age: 73
DRG: 683 | End: 2023-09-10
Payer: MEDICARE

## 2023-09-10 DIAGNOSIS — Z95.0 CARDIAC PACEMAKER: ICD-10-CM

## 2023-09-10 DIAGNOSIS — S62.662B OPEN NONDISPLACED FRACTURE OF DISTAL PHALANX OF RIGHT MIDDLE FINGER, INITIAL ENCOUNTER: Primary | ICD-10-CM

## 2023-09-10 DIAGNOSIS — I50.22 CHRONIC SYSTOLIC CONGESTIVE HEART FAILURE (CMS/HCC): ICD-10-CM
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
## 2023-09-10 PROBLEM — D72.829 LEUKOCYTOSIS: Status: ACTIVE | Noted: 2023-09-10

## 2023-09-10 PROBLEM — S62.92XA HAND FRACTURE, LEFT: Status: RESOLVED | Noted: 2023-09-10 | Resolved: 2023-09-10

## 2023-09-10 PROBLEM — N17.9 AKI (ACUTE KIDNEY INJURY) (CMS/HCC): Status: ACTIVE | Noted: 2023-09-10

## 2023-09-10 PROBLEM — S62.92XA HAND FRACTURE, LEFT: Status: ACTIVE | Noted: 2023-09-10

## 2023-09-10 PROBLEM — R79.89 ELEVATED TROPONIN I LEVEL: Status: ACTIVE | Noted: 2023-09-10

## 2023-09-10 PROBLEM — S62.91XA FRACTURE OF RIGHT HAND: Status: ACTIVE | Noted: 2023-09-10

## 2023-09-10 LAB
ALBUMIN SERPL-MCNC: 3.9 G/DL (ref 3.5–5.7)
ALP SERPL-CCNC: 132 IU/L (ref 34–125)
ALT SERPL-CCNC: 13 IU/L (ref 7–52)
ANION GAP SERPL CALC-SCNC: 11 MEQ/L (ref 3–15)
AST SERPL-CCNC: 17 IU/L (ref 13–39)
BASE EXCESS BLDV CALC-SCNC: 3.7 MEQ/L
BASOPHILS # BLD: 0.03 K/UL (ref 0.01–0.1)
BASOPHILS NFR BLD: 0.2 %
BILIRUB SERPL-MCNC: 1.1 MG/DL (ref 0.3–1.2)
BNP SERPL-MCNC: 159 PG/ML
BUN SERPL-MCNC: 27 MG/DL (ref 7–25)
CALCIUM SERPL-MCNC: 9.5 MG/DL (ref 8.6–10.3)
CHLORIDE SERPL-SCNC: 98 MEQ/L (ref 98–107)
CO2 BLDV-SCNC: 32.2 MEQ/L (ref 22–32)
CO2 SERPL-SCNC: 28 MEQ/L (ref 21–31)
CREAT SERPL-MCNC: 2.5 MG/DL (ref 0.7–1.3)
DIFFERENTIAL METHOD BLD: ABNORMAL
EOSINOPHIL # BLD: 0.04 K/UL (ref 0.04–0.54)
EOSINOPHIL NFR BLD: 0.2 %
ERYTHROCYTE [DISTWIDTH] IN BLOOD BY AUTOMATED COUNT: 13.8 % (ref 11.6–14.4)
FIO2 ON VENT: 96 %
GFR SERPL CREATININE-BSD FRML MDRD: 25.3 ML/MIN/1.73M*2
GLUCOSE BLD-MCNC: 184 MG/DL (ref 70–99)
GLUCOSE BLD-MCNC: 197 MG/DL (ref 70–99)
GLUCOSE BLD-MCNC: 197 MG/DL (ref 70–99)
GLUCOSE SERPL-MCNC: 194 MG/DL (ref 70–99)
HCO3 BLDV-SCNC: 26.9 MEQ/L (ref 21–28)
HCT VFR BLDCO AUTO: 41.2 % (ref 40.1–51)
HGB BLD-MCNC: 13.5 G/DL (ref 13.7–17.5)
IMM GRANULOCYTES # BLD AUTO: 0.12 K/UL (ref 0–0.08)
IMM GRANULOCYTES NFR BLD AUTO: 0.7 %
INHALED O2 CONCENTRATION: ABNORMAL %
LYMPHOCYTES # BLD: 0.63 K/UL (ref 1.2–3.5)
LYMPHOCYTES NFR BLD: 3.6 %
MAGNESIUM SERPL-MCNC: 2 MG/DL (ref 1.8–2.5)
MCH RBC QN AUTO: 30.9 PG (ref 28–33.2)
MCHC RBC AUTO-ENTMCNC: 32.8 G/DL (ref 32.2–36.5)
MCV RBC AUTO: 94.3 FL (ref 83–98)
MONOCYTES # BLD: 0.98 K/UL (ref 0.3–1)
MONOCYTES NFR BLD: 5.5 %
NEUTROPHILS # BLD: 15.94 K/UL (ref 1.7–7)
NEUTS SEG NFR BLD: 89.8 %
NRBC BLD-RTO: 0 %
PCO2 BLDV: 54 MM HG (ref 41–51)
PDW BLD AUTO: 10.3 FL (ref 9.4–12.4)
PH BLDV: 7.36 [PH] (ref 7.32–7.42)
PLATELET # BLD AUTO: 157 K/UL (ref 150–350)
PO2 BLDV: 43 MM HG (ref 25–40)
POCT TEST: ABNORMAL
POTASSIUM SERPL-SCNC: 4.4 MEQ/L (ref 3.5–5.1)
PROT SERPL-MCNC: 8.2 G/DL (ref 6–8.2)
RBC # BLD AUTO: 4.37 M/UL (ref 4.5–5.8)
SODIUM SERPL-SCNC: 137 MEQ/L (ref 136–145)
TROPONIN I SERPL HS-MCNC: 116.6 PG/ML
TROPONIN I SERPL HS-MCNC: 179.9 PG/ML
TROPONIN I SERPL HS-MCNC: 331.3 PG/ML
WBC # BLD AUTO: 17.74 K/UL (ref 3.8–10.5)

## 2023-09-10 PROCEDURE — 93005 ELECTROCARDIOGRAM TRACING: CPT

## 2023-09-10 PROCEDURE — 99285 EMERGENCY DEPT VISIT HI MDM: CPT | Mod: 25

## 2023-09-10 PROCEDURE — 73130 X-RAY EXAM OF HAND: CPT | Mod: RT

## 2023-09-10 PROCEDURE — 82803 BLOOD GASES ANY COMBINATION: CPT

## 2023-09-10 PROCEDURE — 72125 CT NECK SPINE W/O DYE: CPT | Mod: MG

## 2023-09-10 PROCEDURE — 63600000 HC DRUGS/DETAIL CODE: Performed by: INTERNAL MEDICINE

## 2023-09-10 PROCEDURE — 83880 ASSAY OF NATRIURETIC PEPTIDE: CPT

## 2023-09-10 PROCEDURE — 63700000 HC SELF-ADMINISTRABLE DRUG: Performed by: INTERNAL MEDICINE

## 2023-09-10 PROCEDURE — 80053 COMPREHEN METABOLIC PANEL: CPT

## 2023-09-10 PROCEDURE — 25800000 HC PHARMACY IV SOLUTIONS: Performed by: INTERNAL MEDICINE

## 2023-09-10 PROCEDURE — 36415 COLL VENOUS BLD VENIPUNCTURE: CPT

## 2023-09-10 PROCEDURE — 73140 X-RAY EXAM OF FINGER(S): CPT | Mod: RT

## 2023-09-10 PROCEDURE — 11760 REPAIR OF NAIL BED: CPT | Mod: 59

## 2023-09-10 PROCEDURE — 0PSTXZZ REPOSITION RIGHT FINGER PHALANX, EXTERNAL APPROACH: ICD-10-PCS | Performed by: ORTHOPAEDIC SURGERY

## 2023-09-10 PROCEDURE — 84484 ASSAY OF TROPONIN QUANT: CPT

## 2023-09-10 PROCEDURE — 1123F ACP DISCUSS/DSCN MKR DOCD: CPT | Performed by: INTERNAL MEDICINE

## 2023-09-10 PROCEDURE — 90715 TDAP VACCINE 7 YRS/> IM: CPT

## 2023-09-10 PROCEDURE — 83735 ASSAY OF MAGNESIUM: CPT

## 2023-09-10 PROCEDURE — 73030 X-RAY EXAM OF SHOULDER: CPT | Mod: RT

## 2023-09-10 PROCEDURE — 26770 TREAT FINGER DISLOCATION: CPT | Mod: RT

## 2023-09-10 PROCEDURE — 63600000 HC DRUGS/DETAIL CODE

## 2023-09-10 PROCEDURE — 71045 X-RAY EXAM CHEST 1 VIEW: CPT

## 2023-09-10 PROCEDURE — 94660 CPAP INITIATION&MGMT: CPT

## 2023-09-10 PROCEDURE — G1004 CDSM NDSC: HCPCS

## 2023-09-10 PROCEDURE — 73110 X-RAY EXAM OF WRIST: CPT | Mod: RT

## 2023-09-10 PROCEDURE — 20600000 HC ROOM AND CARE INTERMEDIATE/TELEMETRY

## 2023-09-10 PROCEDURE — 85025 COMPLETE CBC W/AUTO DIFF WBC: CPT

## 2023-09-10 PROCEDURE — 96365 THER/PROPH/DIAG IV INF INIT: CPT

## 2023-09-10 PROCEDURE — 90471 IMMUNIZATION ADMIN: CPT

## 2023-09-10 PROCEDURE — 87040 BLOOD CULTURE FOR BACTERIA: CPT

## 2023-09-10 PROCEDURE — 73120 X-RAY EXAM OF HAND: CPT | Mod: RT

## 2023-09-10 PROCEDURE — 99223 1ST HOSP IP/OBS HIGH 75: CPT | Performed by: INTERNAL MEDICINE

## 2023-09-10 PROCEDURE — 84484 ASSAY OF TROPONIN QUANT: CPT | Performed by: INTERNAL MEDICINE

## 2023-09-10 PROCEDURE — 26770 TREAT FINGER DISLOCATION: CPT | Mod: F7

## 2023-09-10 PROCEDURE — 25800000 HC PHARMACY IV SOLUTIONS

## 2023-09-10 PROCEDURE — 70450 CT HEAD/BRAIN W/O DYE: CPT | Mod: ME

## 2023-09-10 RX ORDER — DEXTROSE 50 % IN WATER (D50W) INTRAVENOUS SYRINGE
25 AS NEEDED
Status: DISCONTINUED | OUTPATIENT
Start: 2023-09-10 | End: 2023-09-10

## 2023-09-10 RX ORDER — IBUPROFEN 200 MG
16-32 TABLET ORAL AS NEEDED
Status: DISCONTINUED | OUTPATIENT
Start: 2023-09-10 | End: 2023-09-10

## 2023-09-10 RX ORDER — INSULIN LISPRO 100 [IU]/ML
25 INJECTION, SUSPENSION SUBCUTANEOUS
Status: DISCONTINUED | OUTPATIENT
Start: 2023-09-10 | End: 2023-09-13

## 2023-09-10 RX ORDER — GLIMEPIRIDE 4 MG/1
6 TABLET ORAL
COMMUNITY
End: 2023-09-13 | Stop reason: HOSPADM

## 2023-09-10 RX ORDER — GABAPENTIN 300 MG/1
300 CAPSULE ORAL 2 TIMES DAILY
Status: DISCONTINUED | OUTPATIENT
Start: 2023-09-10 | End: 2023-09-13 | Stop reason: HOSPADM

## 2023-09-10 RX ORDER — DEXTROSE 40 %
15-30 GEL (GRAM) ORAL AS NEEDED
Status: DISCONTINUED | OUTPATIENT
Start: 2023-09-10 | End: 2023-09-13 | Stop reason: HOSPADM

## 2023-09-10 RX ORDER — IBUPROFEN 200 MG
16-32 TABLET ORAL AS NEEDED
Status: DISCONTINUED | OUTPATIENT
Start: 2023-09-10 | End: 2023-09-13 | Stop reason: HOSPADM

## 2023-09-10 RX ORDER — ACETAMINOPHEN 325 MG/1
650 TABLET ORAL EVERY 6 HOURS PRN
Status: DISCONTINUED | OUTPATIENT
Start: 2023-09-10 | End: 2023-09-13 | Stop reason: HOSPADM

## 2023-09-10 RX ORDER — FERROUS SULFATE 325(65) MG
325 TABLET ORAL
Status: DISCONTINUED | OUTPATIENT
Start: 2023-09-11 | End: 2023-09-13 | Stop reason: HOSPADM

## 2023-09-10 RX ORDER — ATORVASTATIN CALCIUM 20 MG/1
20 TABLET, FILM COATED ORAL DAILY
Status: DISCONTINUED | OUTPATIENT
Start: 2023-09-10 | End: 2023-09-13 | Stop reason: HOSPADM

## 2023-09-10 RX ORDER — CETIRIZINE HYDROCHLORIDE 5 MG/1
5 TABLET ORAL DAILY
Status: DISCONTINUED | OUTPATIENT
Start: 2023-09-10 | End: 2023-09-13 | Stop reason: HOSPADM

## 2023-09-10 RX ORDER — DAPAGLIFLOZIN 10 MG/1
10 TABLET, FILM COATED ORAL DAILY
COMMUNITY
End: 2023-09-13 | Stop reason: HOSPADM

## 2023-09-10 RX ORDER — OXYCODONE HYDROCHLORIDE 5 MG/1
5 TABLET ORAL EVERY 6 HOURS PRN
Status: DISCONTINUED | OUTPATIENT
Start: 2023-09-10 | End: 2023-09-13 | Stop reason: HOSPADM

## 2023-09-10 RX ORDER — DEXTROSE 50 % IN WATER (D50W) INTRAVENOUS SYRINGE
25 AS NEEDED
Status: DISCONTINUED | OUTPATIENT
Start: 2023-09-10 | End: 2023-09-13 | Stop reason: HOSPADM

## 2023-09-10 RX ORDER — PANTOPRAZOLE SODIUM 40 MG/1
40 TABLET, DELAYED RELEASE ORAL DAILY
Status: DISCONTINUED | OUTPATIENT
Start: 2023-09-10 | End: 2023-09-13 | Stop reason: HOSPADM

## 2023-09-10 RX ORDER — NAPROXEN SODIUM 220 MG/1
324 TABLET, FILM COATED ORAL ONCE
Status: COMPLETED | OUTPATIENT
Start: 2023-09-10 | End: 2023-09-10

## 2023-09-10 RX ORDER — OXYCODONE HYDROCHLORIDE 5 MG/1
5 TABLET ORAL EVERY 6 HOURS PRN
Status: DISCONTINUED | OUTPATIENT
Start: 2023-09-10 | End: 2023-09-10

## 2023-09-10 RX ORDER — INSULIN LISPRO 100 [IU]/ML
3-5 INJECTION, SOLUTION INTRAVENOUS; SUBCUTANEOUS
Status: DISCONTINUED | OUTPATIENT
Start: 2023-09-10 | End: 2023-09-13 | Stop reason: HOSPADM

## 2023-09-10 RX ORDER — GLIMEPIRIDE 2 MG/1
4 TABLET ORAL
Status: DISCONTINUED | OUTPATIENT
Start: 2023-09-11 | End: 2023-09-13

## 2023-09-10 RX ORDER — NITROGLYCERIN 0.4 MG/1
0.4 TABLET SUBLINGUAL EVERY 5 MIN PRN
Status: DISCONTINUED | OUTPATIENT
Start: 2023-09-10 | End: 2023-09-13 | Stop reason: HOSPADM

## 2023-09-10 RX ORDER — DEXTROSE 40 %
15-30 GEL (GRAM) ORAL AS NEEDED
Status: DISCONTINUED | OUTPATIENT
Start: 2023-09-10 | End: 2023-09-10

## 2023-09-10 RX ADMIN — RIVAROXABAN 20 MG: 20 TABLET, FILM COATED ORAL at 16:51

## 2023-09-10 RX ADMIN — ATORVASTATIN CALCIUM 20 MG: 20 TABLET, FILM COATED ORAL at 15:41

## 2023-09-10 RX ADMIN — ASPIRIN 81 MG 324 MG: 81 TABLET ORAL at 15:38

## 2023-09-10 RX ADMIN — SODIUM CHLORIDE 1 G: 900 INJECTION INTRAVENOUS at 12:59

## 2023-09-10 RX ADMIN — TETANUS TOXOID, REDUCED DIPHTHERIA TOXOID AND ACELLULAR PERTUSSIS VACCINE, ADSORBED 0.5 ML: 5; 2.5; 8; 8; 2.5 SUSPENSION INTRAMUSCULAR at 12:42

## 2023-09-10 RX ADMIN — CETIRIZINE HYDROCHLORIDE 5 MG: 5 TABLET ORAL at 15:42

## 2023-09-10 RX ADMIN — INSULIN LISPRO 3 UNITS: 100 INJECTION, SOLUTION INTRAVENOUS; SUBCUTANEOUS at 18:14

## 2023-09-10 RX ADMIN — CEFTRIAXONE SODIUM 1 G: 1 INJECTION, POWDER, FOR SOLUTION INTRAMUSCULAR; INTRAVENOUS at 17:00

## 2023-09-10 RX ADMIN — PANTOPRAZOLE SODIUM 40 MG: 40 TABLET, DELAYED RELEASE ORAL at 15:37

## 2023-09-10 RX ADMIN — GABAPENTIN 300 MG: 300 CAPSULE ORAL at 22:38

## 2023-09-10 RX ADMIN — ACETAMINOPHEN 650 MG: 325 TABLET ORAL at 16:50

## 2023-09-10 RX ADMIN — INSULIN LISPRO 25 UNITS: 100 INJECTION, SUSPENSION SUBCUTANEOUS at 18:26

## 2023-09-10 ASSESSMENT — VISUAL ACUITY: OU: 1

## 2023-09-10 ASSESSMENT — ENCOUNTER SYMPTOMS
LIGHT-HEADEDNESS: 0
DIZZINESS: 0
NECK STIFFNESS: 0
VOMITING: 0
ABDOMINAL PAIN: 0
PALPITATIONS: 0
NECK PAIN: 0
DIARRHEA: 0
WOUND: 1
SHORTNESS OF BREATH: 0
NUMBNESS: 0
MYALGIAS: 0
FEVER: 0
BACK PAIN: 0
HEADACHES: 0
NAUSEA: 0
CHILLS: 0
ARTHRALGIAS: 0

## 2023-09-10 ASSESSMENT — COGNITIVE AND FUNCTIONAL STATUS - GENERAL
STANDING UP FROM CHAIR USING ARMS: 2 - A LOT
MOVING TO AND FROM BED TO CHAIR: 2 - A LOT
STANDING UP FROM CHAIR USING ARMS: 2 - A LOT
WALKING IN HOSPITAL ROOM: 2 - A LOT
MOVING TO AND FROM BED TO CHAIR: 2 - A LOT
CLIMB 3 TO 5 STEPS WITH RAILING: 1 - TOTAL
WALKING IN HOSPITAL ROOM: 2 - A LOT
CLIMB 3 TO 5 STEPS WITH RAILING: 1 - TOTAL

## 2023-09-10 NOTE — ASSESSMENT & PLAN NOTE
Acute fracture along the dorsum of the Right third finger at the level of the   distal interphalangeal joint.    Ortho consult appreciated.  Clean bandage

## 2023-09-10 NOTE — H&P
Inpatient History & Physical          Hospital Medicine Service -  History & Physical        CHIEF COMPLAINT     Chief Complaint   Patient presents with    Fall        HISTORY OF PRESENT ILLNESS      73 y.o. male with a past medical history of  GERD, HTN, HLD, atrial fibrillation, diabetes type 2 presenting to the emergency department today after a fall.  Patient is on blood thinners.      Most of the history obtained from my discussion with the ER staff/ wife is present who provides additional history.  States he leaves his roller walker right next to his bed.  He was try to get a bed this morning when he fell and landed face first.  The fall was witnessed by his wife.  Denies loss of consciousness.  Denies dizziness or lightheadedness prior to the fall.  Sustained a contusion to the forehead.  States he injured his right middle finger.  EMS was called.  Patient is not up to date on his tetanus vaccine.      Patient denies pain at this time.  Denies headache, visual changes, dizziness, lightheadedness, neck pain/stiffness, back pain, abdominal pain, nausea, vomiting, diarrhea, constipation, chest pain, palpitations, shortness of breath, numbness, tingling.    In ED patient evaluated by orthopedic team for hand injury.  Laboratory reveals worsening renal function elevated troponin level and leukocytosis.  Old records reviewed.    Case discussed with the ER team.  At this point will admit patient for further evaluation and treatment.    PAST MEDICAL AND SURGICAL HISTORY      Past Medical History:   Diagnosis Date    Arthritis     GERD (gastroesophageal reflux disease)     Hypertension     Lipid disorder     Neuropathy     Persistent atrial fibrillation (CMS/[facility]) 03/19/2019    Primary osteoarthritis involving multiple joints 05/15/2019    Pulmonary embolism (CMS/[facility])     Type 2 diabetes mellitus (CMS/[facility])     Type 2 diabetes mellitus with hypoglycemia (CMS/[facility]) 10/24/2013    Overview:     Wound healing,  delayed        Past Surgical History:   Procedure Laterality Date    KNEE ARTHROSCOPY Bilateral     VEIN SURGERY Right        MEDICATIONS      Prior to Admission medications    Medication Sig Start Date End Date Taking? Authorizing Provider   ascorbic acid (VITAMIN C) 500 mg tablet Take 1 tablet by mouth daily.    Imelda Pop MD   atorvastatin (LIPITOR) 20 mg tablet Take 1 tablet (20 mg total) by mouth daily. 3/29/23   Richard Ojeda DO   bumetanide (BUMEX) 1 mg tablet Take 1 mg by mouth 3 (three) times a day.    ProviderOmero MD   cyanocobalamin (VITAMIN B12) 1,000 mcg tablet Take 1,000 tablets by mouth daily.    Imelda Pop MD   dapagliflozin (FARXIGA) 10 mg tablet tablet Take 10 mg by mouth daily.    Omero Pop MD   flash glucose scanning reader (FREESTYLE BURT 2 READER) [facility] [place] Check BG as directed. Change every 2 weeks. DX: Z79.4, E 13.40 5/30/23   Richard Ojeda DO   flash glucose sensor (FREESTYLE BURT 2 SENSOR) kit Check BG as directed DX: Z79.4, E 13.40 5/30/23   Richard Ojeda DO   gabapentin (NEURONTIN) 300 mg capsule TAKE 1 CAPSULE BY MOUTH TWICE A DAY 8/23/23   Richard Ojeda DO   glimepiride (AMARYL) 4 mg tablet Take 6 mg by mouth daily with breakfast. (1.5 Tablets PO QD)    Omero Pop MD   insulin lispro protamin/lispro (HUMALOG MIX 75-25 KWIKPEN SUBQ) Inject 25 Units under the skin 2 (two) times a day.    ProviderOmero MD KLOR-CON M20 20 mEq CR tablet TAKE 1 TABLET BY MOUTH 2 TIMES A DAY. 5/7/23   Maggie Blanca DO   lancets misc for blood glucose monitoring 3x day 10/24/13   Imelda Pop MD   lisinopril (PRINIVIL) 2.5 mg tablet Take 1 tablet (2.5 mg total) by mouth daily. 7/9/19   Aziza Randolph DO   pantoprazole (PROTONIX) 40 mg EC tablet Take 1 tablet (40 mg total) by mouth daily. 6/16/23   Darcy Mcdaniels DO   PEN NEEDLE, DIABETIC (BD ULTRA-FINE MICRO PEN NEEDLE MISC) once daily use with Levemir  8/24/16   Imelda Pop MD   rivaroxaban (XARELTO) 20 mg tablet Take 1 tablet (20 mg total) by mouth daily with dinner. 7/9/19   Aziza Randolph DO   semaglutide (OZEMPIC) 1 mg/dose (2 mg/1.5 mL) subcutaneous injection Inject 1 mg under the skin every (seven) 7 days.    Omero Pop MD   bran-gum-fib-gerard-psyl-kelp-pec 1,000 mg tablet Take 2 capsules by mouth daily.  9/10/23  Imelda Pop MD   bumetanide (BUMEX) 2 mg tablet Take 2 mg by mouth daily.  9/10/23  Omero Pop MD   cetirizine (ZyrTEC) 10 mg tablet Take 10 mg by mouth daily.  9/10/23  Imelda Pop MD   cholecalciferol, vitamin D3, 25 mcg (1,000 unit) capsule one capsule daily 5/10/16 9/10/23  Imelda Pop MD   diclofenac sodium (VOLTAREN) 1 % topical gel 1 %. 2/19/14 9/10/23  Imelda Pop MD   ferrous sulfate 325 mg (65 mg iron) EC tablet Take 1 tablet by mouth every other day.    9/10/23  Imelda Pop MD   oxyCODONE (OXY-IR) 5 mg capsule 5 mg 3 (three) times a day.   8/1/17 9/10/23  Imelda Pop MD   polycarbophil (FIBERCON) 625 mg tablet Take 2 capsules by mouth daily.  9/10/23  Imelda Pop MD   polyethylene glycol (MIRALAX) 17 gram/dose powder take (17G)  by oral route  every day mixed with 8 oz. water, juice, soda, coffee or tea 1/24/14 9/10/23  Imelda Pop MD   rivaroxaban (XARELTO) 20 mg tablet Take 20 mg by mouth daily with dinner.  9/10/23  Omero Pop MD   sildenafil (VIAGRA) 100 mg tablet  9/6/19 9/10/23  Imelda Pop MD       ALLERGIES      Vancomycin and Prednisone    FAMILY HISTORY      Family History   Problem Relation Age of Onset    Factor V Leiden deficiency Biological Sister     Factor V Leiden deficiency Nephew        SOCIAL HISTORY      Social History     Socioeconomic History    Marital status:    Tobacco Use    Smoking status: Never    Smokeless tobacco: Never   Substance and Sexual Activity     Alcohol use: No     Social Determinants of Health     Food Insecurity: Unknown (9/10/2023)    Hunger Vital Sign     Worried About Running Out of Food in the Last Year: Never true       REVIEW OF SYSTEMS      As per HPI, otherwise comprehensive review of systems negative apart from pertinent positive discussed above.    PHYSICAL EXAMINATION      Temp:  [36.6 °C (97.8 °F)-37.4 °C (99.3 °F)] 36.6 °C (97.8 °F)  Heart Rate:  [58-67] 60  Resp:  [16-18] 18  BP: (110-133)/(55-67) 133/67  Body mass index is 42.31 kg/m².    General exam : appears age stated,  frail appearing.  Head: atraumatic, normocephalic  Eyes : PERRLA, EOMI, no pallor, no icterus  ENT: no lesions, oropharynx pink, mucous membranes moist   Neck: supple, no Lymph nodes, no Thyromegaly, no JVD   CVS : Paced rhythm  Resp:normal accessory muscle usage, clear to auscultation Bilaterally  Abdomen : soft, Nt, BS +, no organomegaly   Extremities : +3 edema, no cyanosis   MSK: Right hand injury noted.  Skin: intact, warm, no rash  Neuro: AAO x3, CN 2-12 intact,  motor strength in all extremities, global weakness.  Psych: normal mood.cooperative      LABS / IMAGING / EKG        Labs  CBC Results       09/10/23 01/31/22 11/14/18     1112 1404 1050    WBC 17.74 4.1 4.3    RBC 4.37 4.83 4.19    HGB 13.5 14.7 12.0    HCT 41.2 44.2 36.4    MCV 94.3 91.5 86.9    MCH 30.9 30.4 28.6    MCHC 32.8 33.3 33.0     167 181        CMP Results       09/10/23 01/31/22 06/13/19     1112 1404 0944     134 138    K 4.4 4.8 4.2    Cl 98 97 99    CO2 28 28 31    Glucose 194 387 132    BUN 27 17 20    Creatinine 2.5 1.26 1.36    Calcium 9.5 9.6 9.1    Anion Gap 11 -- --    AST 17 21 24    ALT 13 19 16    Albumin 3.9 3.9 3.7    EGFR 25.3 56 52      65 60         Comment for K at 1112 on 09/10/23: Results obtained on plasma. Plasma Potassium values may be up to 0.4 mEQ/L less than serum values. The differences may be greater for patients with high platelet or white cell  counts.    Comment for Glucose at 1404 on 01/31/22:               Fasting reference interval     For someone without known diabetes, a glucose  value >125 mg/dL indicates that they may have  diabetes and this should be confirmed with a  follow-up test.         Comment for Glucose at 0944 on 06/13/19:               Fasting reference interval     For someone without known diabetes, a glucose  value >125 mg/dL indicates that they may have  diabetes and this should be confirmed with a  follow-up test.         Comment for Creatinine at 1404 on 01/31/22: For patients >49 years of age, the reference limit  for Creatinine is approximately 13% higher for people  identified as -American.         Comment for Creatinine at 0944 on 06/13/19: For patients >49 years of age, the reference limit  for Creatinine is approximately 13% higher for people  identified as -American.               Troponin I Results       09/10/23     1300    HS Troponin I 116.6        Microbiology Results     ** No results found for the last 720 hours. **        UA Results    No lab values to display.         Imaging  X-RAY SHOULDER RIGHT 2+ VIEWS   Final Result   IMPRESSION:   Severe osteoarthritic changes without evidence of acute fracture malalignment as   discussed below.      COMMENT:      Comparison: None.      3 views of the right shoulder. Large subacromial spur. Severe glenohumeral   osteoarthritis. AC joint degenerative disease. Enthesophyte formation and   calcification at the rotator cuff insertion. No glenohumeral dislocation.   Intra-articular body formation in the axillary recess of the glenohumeral joint.   No evidence of acute fracture or malalignment.   Limited assessment of the right hemithorax appear grossly within normal limits.                  X-RAY WRIST RIGHT 3+ VIEWS   Final Result   IMPRESSION:   Demineralization, degenerative disease and other chronic findings as discussed   below with no evidence of acute fracture  malalignment at the right wrist.      COMMENT:      4 views of the right wrist are performed and compared with 9/10/2023 x-rays of   the hands and 4/21/2014.   Demineralization limits assessment. Extensive atherosclerotic vascular   calcifications. Chondrocalcinosis of the TFCC. Diffuse carpal osteoarthritis   greatest at the base of the thumb. Chronic mild widening of the scapholunate   interval is again noted. This could be seen with scapholunate ligament tears.   This is also similar to older x-rays from 4/21/2014. Intra-articular bodies in   the proximal carpus suspected. No evidence of acute fracture. Peripheral IV is   noted in the right hand.      X-RAY HAND RIGHT 3+ VIEWS   Final Result   IMPRESSION:   Acute fracture along the dorsum of the left third finger at the level of the   distal interphalangeal joint. Other, it is uncertain if this fracture fragment   is emanating from the dorsal base of the distal phalanx or the distal portions   of the dorsal middle phalanx or if the fracture involves both phalanges. There   is evidence of overlying soft tissue injury/laceration. No evidence of   radiopaque foreign bodies.   Diffuse degenerative joint disease typical of osteoarthritis. Degenerative   disease has progressed since 2014.   Demineralization may limit assessment. Slight widening of scapholunate interval   could be related to chronic scapholunate ligament tearing.   No evidence of additional fracture or malalignment. Atherosclerotic vascular   calcifications.         COMMENT:      4 views of the right hand are performed and compared with 4/21/2014. See   impression.      X-RAY CHEST 1 VIEW   Final Result   IMPRESSION:   Cannot exclude mild pulmonary vascular congestion.   Otherwise limited study reveals no definite acute disease.      COMMENT:      Comparison: Chest x-ray dated 5/22/2013.         AP erect view of the chest.   Body habitus and diminished lung volumes limit assessment significantly.       No evidence of a pneumothorax or sizable pleural effusion. No overt pulmonary   edema although vascular congestion cannot be excluded. Otherwise lungs appear   grossly clear within the limitations of the study. Left pacer is noted. Cardiac   silhouette appears somewhat enlarged. Atherosclerotic aorta. No gross bone or   upper abdominal findings of concern. Severe shoulder osteoarthritis bilaterally.      ECG 12 lead   ED Interpretation   ECG  Rate: 61  Rhythm: Ventricular paced rhythm  HI Interval: *  QRS Duration: 146  QT/QTc: 466/469  Axis: * -88 83    Impression: Ventricular paced rhythm      CT HEAD WITHOUT IV CONTRAST   Final Result   IMPRESSION:   No evidence of acute intracranial hemorrhage, mass effect or large acute   territorial infarction by CT criteria..      COMMENT:      Comparison: None      TECHNIQUE: CT of the brain was performed without intravenous contrast.   CT DOSE:  One or more dose reduction techniques (e.g. automated exposure   control, adjustment of the mA and/or kV according to patient size, use of   iterative reconstruction technique) utilized for this examination.      FINDINGS:   Ventricles, sulci and basal cisterns: Prominent compatible with cerebral volume   loss.   Parenchyma: Patchy nonspecific moderate white matter disease probably   microangiopathic. No evidence of acute intracranial hemorrhage. No mass effect   or cerebral edema. No CT evidence of large acute territorial infarction.   Extra-axial spaces: No extra-axial fluid collection.   Imaged paranasal sinuses and mastoids: Clear.   Calvarium: No depressed calvarial fracture.   Extra calvarial structures:Atherosclerotic vascular calcifications. Mild soft   tissue swelling may be present in the anterior scalp.   On the  images, there is severe right greater than left osteoarthritis of   the shoulders all left pacer visible.      CT CERVICAL SPINE WITHOUT IV CONTRAST   Final Result   IMPRESSION:   No evidence of acute  fracture or posttraumatic subluxation.   Image quality is mildly degraded by artifact mildly limiting assessment.   Advanced degenerative disease possibly with severe central spinal stenosis at   C6-C7 due to a disc osteophyte complex.      COMMENT:      Comparison: None.      Technique: Noncontrast CT of the C-spine   CT DOSE:  The kV and/or mA were adjusted according to the patient's size and   body part for CT dose reduction.      Findings:   Straightened cervical lordosis. No prevertebral soft tissue swelling. Please   note that assessment is slightly limited due to streak artifact from body   habitus.   Craniocervical junction is normally aligned. No widening atlantodental interval.   Ligamentous calcifications about the dens are nonspecific although can be seen   with CPPD. Small chronic appearing erosions are also noted in the base of the   dens more so on the right.   No subluxation. No evidence of fracture within the confines the study. Advanced   degenerative disease. There may be severe central spinal stenosis at C6-7 due to   disc osteophyte complex. Otherwise varying degrees of central canal and neural   foraminal stenosis elsewhere. Limited assessment of central canal with no   obvious epidural fluid collection.   No gross findings of concern in the upper lung fields.   There are atherosclerotic changes. Left pacer leads partially imaged.   Limited assessment of the unenhanced soft tissues neck reveals no gross findings   of concern.         Transthoracic echo (TTE) complete    (Results Pending)         ECG/Telemetry  reviewed by me    ASSESSMENT AND PLAN           * Fall  Assessment & Plan  73-year-old male with past medical history of ambulatory dysfunction, morbid obesity, CHF, hypertension, diabetes presents emergency room after sustaining a fall this morning.  Fall appears to be mechanical.  CT head/cervical spine is negative.  Right hand middle finger fracture noted.    Elevated troponin I  level  Assessment & Plan  Elevated troponin level noted 116.  Paced rhythm.  Patient denies chest pain.  Aspirin 324x1 dose given.  Continue to trend cardiac enzymes.  Cardiology consult.    Fracture of right hand  Assessment & Plan  Acute fracture along the dorsum of the Right third finger at the level of the   distal interphalangeal joint.    Ortho consult appreciated.    DAXA (acute kidney injury) (CMS/[facility])  Assessment & Plan  Baseline creatinine 1.36.  Today creatinine noted 2.5.  Likely secondary to diuretic use.  We will hold diuretics.  Avoid nephrotoxins.  Nephrology consult.    Chronic systolic congestive heart failure (CMS/[facility])  Assessment & Plan  History of CHF.  We will hold Bumex due to worsening renal function.  Component of mild CHF clinically noted with bilateral leg swelling.  However patient denies any cardiac symptoms.  We will follow cardiology recommendations.    Persistent atrial fibrillation (CMS/[facility])  Assessment & Plan  Paced rhythm.  Continue Xarelto.  Telemetry monitoring.    Leukocytosis  Assessment & Plan  Leukocytosis, 17 K White count, no fever.  Will check urinalysis.  Chest x-ray no obvious pneumonia.  Continue to monitor.  Off of antibiotics.    History of pulmonary embolism  Assessment & Plan  History of DVT and PE in the past.  On Xarelto.    Sleep apnea  Assessment & Plan  CPAP at nighttime.    Benign essential hypertension  Assessment & Plan  Blood pressure is stable.  At this point will hold ACE inhibitors and Bumex due to worsening renal function.  We will follow BMP closely.    Mixed hyperlipidemia  Assessment & Plan  Stable.  Continue statins.        VTE Assessment: Padua    VTE Prophylaxis Plan:   Code Status: Full Code       Fransico Stratton MD  9/10/2023

## 2023-09-10 NOTE — ASSESSMENT & PLAN NOTE
73-year-old male with past medical history of ambulatory dysfunction, morbid obesity, CHF, hypertension, diabetes presents emergency room after sustaining a fall this morning.  Fall appears to be mechanical.  CT head/cervical spine is negative.  Right hand middle finger fracture noted. Clean bandage. Appreciate ortho  PT/OT

## 2023-09-10 NOTE — ASSESSMENT & PLAN NOTE
Elevated troponin level noted 116.  Paced rhythm.  Patient denies chest pain.  Aspirin 324x1 dose given.  Continue to trend cardiac enzymes.  Cardiology consult.

## 2023-09-10 NOTE — CONSULTS
Orthopedic Surgery Consult Note    Subjective     Mitchell Sauceda is a 73 y.o. male RHD who presented to the ER after a fall off his bed earlier today. Endorsing right shoulder pain but denies any other orthopedic complaints at this time including wrist/elbow/hip/knee pain.     Radiographs demonstrate a third right intra-articular middle phalanx fracture, dorsally angulated.     Mitchell is admitted for DAXA, CHF, and fall management.     Medical History:   Past Medical History:   Diagnosis Date    Arthritis     GERD (gastroesophageal reflux disease)     Hypertension     Lipid disorder     Neuropathy     Persistent atrial fibrillation (CMS/[facility]) 3/19/2019    Primary osteoarthritis involving multiple joints 5/15/2019    Pulmonary embolism (CMS/[facility])     Type 2 diabetes mellitus (CMS/[facility])     Type 2 diabetes mellitus with hypoglycemia (CMS/[facility]) 10/24/2013    Overview:        Surgical History:   Past Surgical History:   Procedure Laterality Date    KNEE ARTHROSCOPY Bilateral     VEIN SURGERY Right        Social History:   Social History     Social History Narrative    Not on file       Family History:   Family History   Problem Relation Age of Onset    Factor V Leiden deficiency Biological Sister     Factor V Leiden deficiency Nephew        Allergies: Vancomycin and Prednisone    Current Inpatient Medications   Medication Dose Route Frequency Provider Last Rate Last Admin    ceFAZolin (ANCEF) 1 g/50 mL NSS  1 g intravenous Once Liliam Voss PA C            Medication List      CONTINUE taking these medications    BD ULTRA-FINE MICRO PEN NEEDLE MISC  once daily use with Levemir     FREESTYLE BURT 2 READER misc  Check BG as directed. Change every 2 weeks. DX: Z79.4, E 13.40  Generic drug: flash glucose scanning reader     FREESTYLE BURT 2 SENSOR kit  Check BG as directed DX: Z79.4, E 13.40  Generic drug: flash glucose sensor     lancets misc  for blood glucose monitoring 3x day        ASK your doctor  about these medications    ascorbic acid 500 mg tablet  Commonly known as: VITAMIN C  Take 1 tablet by mouth daily.  Dose: 1 tablet     atorvastatin 20 mg tablet  Commonly known as: LIPITOR  Take 1 tablet (20 mg total) by mouth daily.  Dose: 20 mg     bran-gum-fib-gerard-psyl-kelp-pec 1,000 mg tablet  Take 2 capsules by mouth daily.  Dose: 2 capsule     * bumetanide 2 mg tablet  Commonly known as: BUMEX  Take 2 mg by mouth daily.  Dose: 2 mg     * bumetanide 1 mg tablet  Commonly known as: BUMEX  Take 1 mg by mouth daily. In the afternoon  Dose: 1 mg     cetirizine 10 mg tablet  Commonly known as: ZyrTEC  Take 10 mg by mouth daily.  Dose: 10 mg     cholecalciferol (vitamin D3) 25 mcg (1,000 unit) capsule  one capsule daily     diclofenac sodium 1 % topical gel  Commonly known as: VOLTAREN  1 %.  Dose: 1 %     FARXIGA 10 mg tablet tablet  Take 10 mg by mouth daily.  Dose: 10 mg  Generic drug: dapagliflozin     ferrous sulfate 325 mg (65 mg iron) EC tablet  Take 1 tablet by mouth every other day.  Dose: 1 tablet     gabapentin 300 mg capsule  Commonly known as: NEURONTIN  TAKE 1 CAPSULE BY MOUTH TWICE A DAY  Dose: 300 mg     glimepiride 4 mg tablet  Commonly known as: AMARYL  Take 6 mg by mouth daily with breakfast.  Dose: 6 mg     HUMALOG MIX 75-25 KWIKPEN SUBQ  Inject 10 Units under the skin 2 (two) times a day.  Dose: 10 Units     KLOR-CON M20 20 mEq CR tablet  TAKE 1 TABLET BY MOUTH 2 TIMES A DAY.  Generic drug: potassium chloride     lisinopriL 2.5 mg tablet  Commonly known as: PRINIVIL  Take 1 tablet (2.5 mg total) by mouth daily.  Dose: 2.5 mg     oxyCODONE 5 mg capsule  Commonly known as: OXY-IR  5 mg 3 (three) times a day.  Dose: 5 mg     pantoprazole 40 mg EC tablet  Commonly known as: PROTONIX  Take 1 tablet (40 mg total) by mouth daily.  Dose: 40 mg     polycarbophil 625 mg tablet  Commonly known as: FIBERCON  Take 2 capsules by mouth daily.  Dose: 2 capsule     polyethylene glycol 17 gram/dose  powder  Commonly known as: MIRALAX  take (17G)  by oral route  every day mixed with 8 oz. water, juice, soda, coffee or tea     * rivaroxaban 20 mg tablet  Commonly known as: XARELTO  Take 20 mg by mouth daily with dinner.  Dose: 20 mg     * rivaroxaban 20 mg tablet  Commonly known as: XARELTO  Take 1 tablet (20 mg total) by mouth daily with dinner.  Dose: 20 mg     semaglutide 1 mg/dose (2 mg/1.5 mL) subcutaneous injection  Commonly known as: OZEMPIC  Inject 1 mg under the skin every (seven) 7 days.  Dose: 1 mg     sildenafiL 100 mg tablet  Commonly known as: VIAGRA     VITAMIN B-12 ORAL  Take 1 tablet by mouth daily.  Dose: 1 tablet         * This list has 4 medication(s) that are the same as other medications prescribed for you. Read the directions carefully, and ask your doctor or other care provider to review them with you.              Review of Systems  See HPI    Objective   Labs  CBC Results       09/10/23 01/31/22 11/14/18     1112 1404 1050    WBC 17.74 4.1 4.3    RBC 4.37 4.83 4.19    HGB 13.5 14.7 12.0    HCT 41.2 44.2 36.4    MCV 94.3 91.5 86.9    MCH 30.9 30.4 28.6    MCHC 32.8 33.3 33.0     167 181         BMP Results       09/10/23 01/31/22 06/13/19     1112 1404 0944     134 138    K 4.4 4.8 4.2    Cl 98 97 99    CO2 28 28 31    Glucose 194 387 132    BUN 27 17 20    Creatinine 2.5 1.26 1.36    Calcium 9.5 9.6 9.1    Anion Gap 11 -- --    EGFR 25.3 56 52      65 60         Comment for K at 1112 on 09/10/23: Results obtained on plasma. Plasma Potassium values may be up to 0.4 mEQ/L less than serum values. The differences may be greater for patients with high platelet or white cell counts.    Comment for Glucose at 1404 on 01/31/22:               Fasting reference interval     For someone without known diabetes, a glucose  value >125 mg/dL indicates that they may have  diabetes and this should be confirmed with a  follow-up test.         Comment for Glucose at 0944 on 06/13/19:                Fasting reference interval     For someone without known diabetes, a glucose  value >125 mg/dL indicates that they may have  diabetes and this should be confirmed with a  follow-up test.         Comment for Creatinine at 1404 on 01/31/22: For patients >49 years of age, the reference limit  for Creatinine is approximately 13% higher for people  identified as -American.         Comment for Creatinine at 0944 on 06/13/19: For patients >49 years of age, the reference limit  for Creatinine is approximately 13% higher for people  identified as -American.                  Imaging  X-ray R hand: right third middle phalanx fracture, intra-articular and dorsally angulated  X-ray R shoulder: GH arthritis, no fracture or dislocations  X-ray R wrist: no fracture or dislocations   CT C-spine: no fracture or dislocations      Physical Exam  Temp:  [37.3 °C (99.2 °F)] 37.3 °C (99.2 °F)  Heart Rate:  [60-67] 60  Resp:  [16-18] 18  BP: (111-112)/(56-58) 111/58  SpO2:  [94 %-96 %] 96 %     General: AVSS, NAD  Head: NC/AT  Resp: no labored breathing  Neuro: awake, alert  MSK:     RUE:  -closed injury,<1 cm laceration over the dorsal distal phalanx lateral aspect, ~0.5cm laceration over the dorsal distal phalanx at the medial aspect  -Compartments soft and compressible  - no TTP of other bony prominences  -No pain or crepitus with ROM of shoulder/elbow/fingers  -generalized stiffness of entire 3rd digit secondary to OA   -+ limited extension at right 3rd DIP joint, full extension of MCP, PIP, DIP  - no palpable defect to Fingers/Hand/Forearm/Elbow/Humerus/Shoulder/Clavicle  -SILT median/ulnar/radial  -Motor intact to axillary/musculocutaneous/radial/ulnar/median/AIN/PIN  -Radial Pulse 2+  -Hand wwp, BCR    Procedure: R third digital nerve block, laceration repair, nail removal, closed reduction and splinting - risks and benefits discussed with patient and verbal consent obtained for R digital nerve block, closed  reduction of third middle phalanx and splinting. The skin was prepped with alcohol and a 25g needle was used to inject 5 cc of 1% lidocaine into lateral aspects of right third digit dorsally. Once adequate anesthesia was obtained, manual pressure was used to reduce the fracture. Next, a hemostat was used to remove the nail in order to assess for any injuries to the nail bed. One chromic gut suture was placed into each laceration. Xeroform was placed into the nail bed and over the sutures. A well padded AlumaFoam was placed over the right 3rd digit. Right digits 2 and 3 were ed taped. Patient tolerated procedure well and remained NVI post procedure.        Assessment   73 y.o. male being consulted for right third digit injury and distal phalanx lacerations and after fall off bed, radiographs demonstrate third middle phalanx intra-articular fracture, treated with closed reduction and AlumaFoam splint, urgent operative fixation is not indicated at this time       Plan   -No acute surgical intervention indicated at this time   -Digital nerve block  -Laceration repair over dorsal distal phalanx on lateral and medial aspect   -Closed reduced, AlumaFoam splint placed, ed taped 2nd and 3rd right digits  -Maintain splint until follow-up outpatient  -Nonweightbearing R hand   -Encourage right wrist and elbow motion to avoid stiffness  -Follow-up outpatient within 1 week with Dr. Solis  -Discussed with Dr. Solis who agrees with plan  -Please do not hesitate to reach out with any questions concerning this consult  -Rest of care per primary team    Laura Marr DO   Orthopedic Surgery Resident PGY-1  Pager [phone]

## 2023-09-10 NOTE — ASSESSMENT & PLAN NOTE
Leukocytosis-normalized , no fever. UA negative,  Chest x-ray no obvious pneumonia.  Continue to monitor vital signs closely. Monitor off antibiotics  Could be related to infected left leg wound  Podiatry consult appreciated  Blood culture pending  ID consult appreciated- he has been started on Unasyn and CRP markedly elevated, needs MRI left leg today (pacemaker compatible)

## 2023-09-10 NOTE — ASSESSMENT & PLAN NOTE
Acute fracture along the dorsum of the left third finger at the level of the   distal interphalangeal joint

## 2023-09-10 NOTE — ASSESSMENT & PLAN NOTE
Blood pressure is stable.  At this point will hold ACE inhibitors and Bumex due to worsening renal function.  We will follow BMP closely.

## 2023-09-10 NOTE — ED PROVIDER NOTES
Emergency Medicine Note  HPI   HISTORY OF PRESENT ILLNESS     Patient is a 73-year-old male with past medical history of GERD, HTN, HLD, atrial fibrillation, diabetes type 2 presenting to the emergency department today after a fall.  Patient is on blood thinners.  His wife is present who provides additional history.  States he leaves his roller walker right next to his bed.  He was try to get a bed this morning when he fell and landed face first.  The fall was witnessed by his wife.  Denies loss of consciousness.  Denies dizziness or lightheadedness prior to the fall.  Sustained a contusion to the forehead.  States he injured his right middle finger.  EMS was called.  Patient is not up to date on his tetanus vaccine.  Patient denies pain at this time.  Denies headache, visual changes, dizziness, lightheadedness, neck pain/stiffness, back pain, abdominal pain, nausea, vomiting, diarrhea, constipation, chest pain, palpitations, shortness of breath, numbness, tingling.            Patient History   PAST HISTORY     Reviewed from Nursing Triage:       Past Medical History:   Diagnosis Date    Arthritis     GERD (gastroesophageal reflux disease)     Hypertension     Lipid disorder     Neuropathy     Persistent atrial fibrillation (CMS/[facility]) 3/19/2019    Primary osteoarthritis involving multiple joints 5/15/2019    Pulmonary embolism (CMS/[facility])     Type 2 diabetes mellitus (CMS/[facility])     Type 2 diabetes mellitus with hypoglycemia (CMS/[facility]) 10/24/2013    Overview:        Past Surgical History:   Procedure Laterality Date    KNEE ARTHROSCOPY Bilateral     VEIN SURGERY Right        Family History   Problem Relation Age of Onset    Factor V Leiden deficiency Biological Sister     Factor V Leiden deficiency Nephew        Social History     Tobacco Use    Smoking status: Never    Smokeless tobacco: Never   Substance Use Topics    Alcohol use: No         Review of Systems   REVIEW OF SYSTEMS     Review of Systems    Constitutional: Negative for chills and fever.   Respiratory: Negative for shortness of breath.    Cardiovascular: Negative for chest pain and palpitations.   Gastrointestinal: Negative for abdominal pain, diarrhea, nausea and vomiting.   Musculoskeletal: Negative for arthralgias, back pain, gait problem, myalgias, neck pain and neck stiffness.   Skin: Positive for wound (R finger).   Neurological: Negative for dizziness, syncope, light-headedness, numbness and headaches.         VITALS     ED Vitals    Date/Time Temp Pulse Resp BP SpO2 [facility]   09/10/23 1405 37.4 °C (99.3 °F) 58 18 110/55 96 % HC   09/10/23 1318 -- 61 16 114/57 98 % FT   09/10/23 1209 -- 60 18 111/58 96 %    09/10/23 1100 37.3 °C (99.2 °F) 67 16 -- 94 % ESH   09/10/23 1100 -- -- -- 112/56 -- HC        Pulse Ox %: 94 % (09/10/23 1151)  Pulse Ox Interpretation: Normal (09/10/23 1151)  Heart Rate: 67 (09/10/23 1151)  Rhythm Strip Interpretation: Paced Rhythm (09/10/23 1151)     Physical Exam   PHYSICAL EXAM     Physical Exam  Vitals and nursing note reviewed.   Constitutional:       General: He is awake. He is not in acute distress.     Appearance: He is well-developed. He is not ill-appearing, toxic-appearing or diaphoretic.      Comments: Large body habitus   HENT:      Head: Normocephalic and atraumatic.      Comments: Mild abrasion to the forehead     Right Ear: Hearing normal.      Left Ear: Hearing normal.      Nose: Nose normal. No congestion or rhinorrhea.      Mouth/Throat:      Lips: Pink.      Mouth: Mucous membranes are moist.   Eyes:      General: Lids are normal. Vision grossly intact.      Extraocular Movements: Extraocular movements intact.      Conjunctiva/sclera: Conjunctivae normal.      Pupils: Pupils are equal, round, and reactive to light.   Neck:      Comments: Full range of motion of the neck without pain on movement.  There is no spinous process tenderness on palpation.  Cardiovascular:      Rate and Rhythm: Normal rate and  regular rhythm.      Pulses:           Radial pulses are 2+ on the right side and 2+ on the left side.      Heart sounds: Normal heart sounds, S1 normal and S2 normal. No murmur heard.  Pulmonary:      Effort: Pulmonary effort is normal. No respiratory distress.      Breath sounds: Normal breath sounds.   Abdominal:      General: Abdomen is protuberant.      Palpations: Abdomen is soft.      Tenderness: There is no abdominal tenderness.   Musculoskeletal:      Cervical back: Normal range of motion and neck supple. No spinous process tenderness.      Comments: Tenderness on palpation of the distal phalanx of the right third finger.  Without visible laceration but nailbed is lifted resulting in slow active bleeding around the nailbed.  Difficulty flexing the DIP joint of the right middle finger but normal range of motion of the MCP and DIP joint of the right third digit.   Skin:     General: Skin is warm and dry.   Neurological:      Mental Status: He is alert.      Comments: Speech is clear and fluent. CN II-XII grossly intact. Symmetric facial movements. Sensation to light touch is intact in B/L upper and lower extremities. 5/5 strength to B/L upper and lower extremities. No pronator drift. Normal finger to nose B/L.   Psychiatric:         Behavior: Behavior is cooperative.           PROCEDURES     Procedures     DATA     Results     Procedure Component Value Units Date/Time    HS Troponin (with 2 hour reflex) [[id number]]  (Abnormal) Collected: 09/10/23 1300    Specimen: Blood, Venous Updated: 09/10/23 1415     High Sens Troponin I 116.6 pg/mL     Narrative:      Specimen repeated    Blood Culture Blood, Venous [[id number]] Collected: 09/10/23 1302    Specimen: Blood, Venous Updated: 09/10/23 1355    Blood gas, venous [[id number]]  (Abnormal) Collected: 09/10/23 1302    Specimen: Blood, Venous Updated: 09/10/23 1321     pH, Venous 7.36     pCO2, Venous 54 mm Hg      pO2, Venous 43 mm Hg      HCO3, Venous 26.9 mEQ/L       Base Excess, Venous 3.7 mEQ/L      TCO2, Venous 32.2 mEQ/L      Source Of Oxygen room air     FIO2 96    Blood Culture Blood, Venous [[id number]] Collected: 09/10/23 1302    Specimen: Blood, Venous Updated: 09/10/23 1320    B-type natriuretic peptide [[id number]]  (Abnormal) Collected: 09/10/23 1112    Specimen: Blood, Venous Updated: 09/10/23 1303      pg/mL     Comprehensive metabolic panel [[id number]]  (Abnormal) Collected: 09/10/23 1112    Specimen: Blood, Venous Updated: 09/10/23 1148     Sodium 137 mEQ/L      Potassium 4.4 mEQ/L      Comment: Results obtained on plasma. Plasma Potassium values may be up to 0.4 mEQ/L less than serum values. The differences may be greater for patients with high platelet or white cell counts.        Chloride 98 mEQ/L      CO2 28 mEQ/L      BUN 27 mg/dL      Creatinine 2.5 mg/dL      Glucose 194 mg/dL      Calcium 9.5 mg/dL      AST (SGOT) 17 IU/L      ALT (SGPT) 13 IU/L      Alkaline Phosphatase 132 IU/L      Total Protein 8.2 g/dL      Comment: Test performed on plasma which typically contains approximately 0.4 g/dL more protein than serum.        Albumin 3.9 g/dL      Bilirubin, Total 1.1 mg/dL      eGFR 25.3 mL/min/1.73m*2      Anion Gap 11 mEQ/L     Magnesium [[id number]]  (Normal) Collected: 09/10/23 1112    Specimen: Blood, Venous Updated: 09/10/23 1148     Magnesium 2.0 mg/dL     CBC and differential [[id number]]  (Abnormal) Collected: 09/10/23 1112    Specimen: Blood, Venous Updated: 09/10/23 1126     WBC 17.74 K/uL      RBC 4.37 M/uL      Hemoglobin 13.5 g/dL      Hematocrit 41.2 %      MCV 94.3 fL      MCH 30.9 pg      MCHC 32.8 g/dL      RDW 13.8 %      Platelets 157 K/uL      MPV 10.3 fL      Differential Type Auto     nRBC 0.0 %      Immature Granulocytes 0.7 %      Neutrophils 89.8 %      Lymphocytes 3.6 %      Monocytes 5.5 %      Eosinophils 0.2 %      Basophils 0.2 %      Immature Granulocytes, Absolute 0.12 K/uL      Neutrophils, Absolute 15.94 K/uL       Lymphocytes, Absolute 0.63 K/uL      Monocytes, Absolute 0.98 K/uL      Eosinophils, Absolute 0.04 K/uL      Basophils, Absolute 0.03 K/uL     RAINBOW PINK [[id number]] Collected: 09/10/23 1112    Specimen: Blood, Venous Updated: 09/10/23 1122    Extra Tubes [[id number]] Collected: 09/10/23 1112    Specimen: Blood, Venous Updated: 09/10/23 1122    Narrative:      The following orders were created for panel order Extra Tubes.  Procedure                               Abnormality         Status                     ---------                               -----------         ------                     Extra Tube Lt Green[[id number]]                              In process                   Please view results for these tests on the individual orders.    Extra Tube Lt Green [[id number]] Collected: 09/10/23 1112    Specimen: Blood, Venous Updated: 09/10/23 1122    RAINBOW LT BLUE [[id number]] Collected: 09/10/23 1112    Specimen: Blood, Venous Updated: 09/10/23 1122    RAINBOW GOLD [[id number]] Collected: 09/10/23 1112    Specimen: Blood, Venous Updated: 09/10/23 1122    [place] Draw Panel [[id number]] Collected: 09/10/23 1112    Specimen: Blood, Venous Updated: 09/10/23 1122    Narrative:      The following orders were created for panel order [place] Draw Panel.  Procedure                               Abnormality         Status                     ---------                               -----------         ------                     RAINBOW RED[[id number]]                                      In process                 RAINBOW PINK[[id number]]                                     In process                 RAINBOW LT BLUE[[id number]]                                  In process                 RAINBOW GOLD[[id number]]                                     In process                   Please view results for these tests on the individual orders.    RAINBOW RED [[id number]] Collected: 09/10/23 1112    Specimen: Blood, Venous Updated:  09/10/23 1122          Imaging Results          X-RAY HAND RIGHT 3+ VIEWS (Final result)  Result time 09/10/23 12:10:56    Final result                 Impression:    IMPRESSION:  Acute fracture along the dorsum of the left third finger at the level of the  distal interphalangeal joint. Other, it is uncertain if this fracture fragment  is emanating from the dorsal base of the distal phalanx or the distal portions  of the dorsal middle phalanx or if the fracture involves both phalanges. There  is evidence of overlying soft tissue injury/laceration. No evidence of  radiopaque foreign bodies.  Diffuse degenerative joint disease typical of osteoarthritis. Degenerative  disease has progressed since 2014.  Demineralization may limit assessment. Slight widening of scapholunate interval  could be related to chronic scapholunate ligament tearing.  No evidence of additional fracture or malalignment. Atherosclerotic vascular  calcifications.      COMMENT:    4 views of the right hand are performed and compared with 4/21/2014. See  impression.             Narrative:    CLINICAL HISTORY: right 3rd finger injury r/o open fx or dislocation                               X-RAY CHEST 1 VIEW (Final result)  Result time 09/10/23 12:07:55   Procedure changed from X-RAY CHEST 2 VIEWS     Final result                 Impression:    IMPRESSION:  Cannot exclude mild pulmonary vascular congestion.  Otherwise limited study reveals no definite acute disease.    COMMENT:    Comparison: Chest x-ray dated 5/22/2013.      AP erect view of the chest.  Body habitus and diminished lung volumes limit assessment significantly.    No evidence of a pneumothorax or sizable pleural effusion. No overt pulmonary  edema although vascular congestion cannot be excluded. Otherwise lungs appear  grossly clear within the limitations of the study. Left pacer is noted. Cardiac  silhouette appears somewhat enlarged. Atherosclerotic aorta. No gross bone or  upper  abdominal findings of concern. Severe shoulder osteoarthritis bilaterally.             Narrative:    CLINICAL HISTORY: Fell out of bed. Rule out pneumonia.                               CT HEAD WITHOUT IV CONTRAST (Final result)  Result time 09/10/23 11:56:53    Final result                 Impression:    IMPRESSION:  No evidence of acute intracranial hemorrhage, mass effect or large acute  territorial infarction by CT criteria..    COMMENT:    Comparison: None    TECHNIQUE: CT of the brain was performed without intravenous contrast.  CT DOSE:  One or more dose reduction techniques (e.g. automated exposure  control, adjustment of the mA and/or kV according to patient size, use of  iterative reconstruction technique) utilized for this examination.    FINDINGS:  Ventricles, sulci and basal cisterns: Prominent compatible with cerebral volume  loss.  Parenchyma: Patchy nonspecific moderate white matter disease probably  microangiopathic. No evidence of acute intracranial hemorrhage. No mass effect  or cerebral edema. No CT evidence of large acute territorial infarction.  Extra-axial spaces: No extra-axial fluid collection.  Imaged paranasal sinuses and mastoids: Clear.  Calvarium: No depressed calvarial fracture.  Extra calvarial structures:Atherosclerotic vascular calcifications. Mild soft  tissue swelling may be present in the anterior scalp.  On the  images, there is severe right greater than left osteoarthritis of  the shoulders all left pacer visible.             Narrative:    CLINICAL HISTORY: Head trauma, moderate-severe  fall on thinners                               CT CERVICAL SPINE WITHOUT IV CONTRAST (Final result)  Result time 09/10/23 12:02:11    Final result                 Impression:    IMPRESSION:  No evidence of acute fracture or posttraumatic subluxation.  Image quality is mildly degraded by artifact mildly limiting assessment.  Advanced degenerative disease possibly with severe central spinal  stenosis at  C6-C7 due to a disc osteophyte complex.    COMMENT:    Comparison: None.    Technique: Noncontrast CT of the C-spine  CT DOSE:  The kV and/or mA were adjusted according to the patient's size and  body part for CT dose reduction.    Findings:  Straightened cervical lordosis. No prevertebral soft tissue swelling. Please  note that assessment is slightly limited due to streak artifact from body  habitus.  Craniocervical junction is normally aligned. No widening atlantodental interval.  Ligamentous calcifications about the dens are nonspecific although can be seen  with CPPD. Small chronic appearing erosions are also noted in the base of the  dens more so on the right.  No subluxation. No evidence of fracture within the confines the study. Advanced  degenerative disease. There may be severe central spinal stenosis at C6-7 due to  disc osteophyte complex. Otherwise varying degrees of central canal and neural  foraminal stenosis elsewhere. Limited assessment of central canal with no  obvious epidural fluid collection.  No gross findings of concern in the upper lung fields.  There are atherosclerotic changes. Left pacer leads partially imaged.  Limited assessment of the unenhanced soft tissues neck reveals no gross findings  of concern.               Narrative:    CLINICAL HISTORY: fall hitting head on thinners                                ECG 12 lead   Independent Interpretation by ED Provider   ECG  Rate: 61  Rhythm: Ventricular paced rhythm  PA Interval: *  QRS Duration: 146  QT/QTc: 466/469  Axis: * -88 83    Impression: Ventricular paced rhythm          Scoring tools                                  ED Course & MDM   MDM / ED COURSE / CLINICAL IMPRESSION / DISPO     Medical Decision Making  Fall, initial encounter: acute illness or injury  Open nondisplaced fracture of distal phalanx of right middle finger, initial encounter: acute illness or injury  Amount and/or Complexity of Data Reviewed  Labs:  ordered. Decision-making details documented in ED Course.  Radiology: ordered. Decision-making details documented in ED Course.  ECG/medicine tests: independent interpretation performed.      Risk  Prescription drug management.          ED Course as of 09/10/23 1439   Sun Sep 10, 2023   1149 Patient is hemodynamically stable, alert, resting comfortably during the evaluation. The care and workup of this patient was discussed with the attending physician Dr. Winston.  Impression: Fall out of bed, injury to the right third digit, not up to date on tetanus  Plan: Labs, x-rays, head CT [EP]   1150 WBC(!): 17.74 [EP]   1150 Creatinine(!): 2.5 [EP]   1150 eGFR(!): 25.3 [EP]   1206 CT HEAD WITHOUT IV CONTRAST  IMPRESSION:  No evidence of acute intracranial hemorrhage, mass effect or large acute territorial infarction by CT criteria. [EP]   1206 CT CERVICAL SPINE WITHOUT IV CONTRAST  IMPRESSION:  No evidence of acute fracture or posttraumatic subluxation. Image quality is mildly degraded by artifact mildly limiting assessment. Advanced degenerative disease possibly with severe central spinal stenosis at C6-C7 due to a disc osteophyte complex. [EP]   1230 Paged Ortho regarding open fracture of the right third digit [EP]   1230 Independent interpretation of x-ray: Pulmonary edema noted  BNP and troponin ordered [EP]   1230 Independent interpretation of right hand x-ray: Acute fracture of the right third middle and proximal phalanx [EP]   1246 D/w Ortho resident, Dr. Marr, who will come evaluate the patient. [EP]   1304 BNP(!): 159 [EP]   1430 High Sens Troponin I(!!): 116.6 [EP]   1430 Paged [facility] for admission [EP]   1437 D/w Dr. Stratton, [facility], who will admit the patient. [EP]      ED Course User Index  [EP] Liliam Voss PA C     Clinical Impression      Fall, initial encounter   Open nondisplaced fracture of distal phalanx of right middle finger, initial encounter     _________________     ED Disposition   Admit / Observation                    Liliam Voss PA C  09/10/23 [id number]

## 2023-09-10 NOTE — ASSESSMENT & PLAN NOTE
History of CHF.  We will hold Bumex due to worsening renal function.    Denies SOB.  However patient denies any cardiac symptoms.  We will follow cardiology recommendations.  Echo today

## 2023-09-10 NOTE — ED ATTESTATION NOTE
Procedures      9/10/2023  11:23 AM  I have personally seen and examined the patient.  I personally performed the key components of the encounter and provided medical decision making for this patient. I reviewed and agree with the PA/NP/Resident's assessment and plan of care, with any exceptions as documented below.    My focused history, examination, assessment, and plan of care of Mitchell Sauceda is as follows:    HPI: The patient is a 73 y.o. who comes to the ED for fall from bed height.  Patient denies any preceding symptoms.  Thinks hit head but did not lose consciousness.  No headache, nausea or vomiting.  No neck or back pain.  Did injure his right middle finger.  On blood thinners.    I was provided additional history from: EMS.    Pertinent past medical history includes: HTN, HL, diabetes      Exam: Awake, alert, obese, no distress.  GCS 15.  Answers questions appropriately.  Tiny abrasion to the forehead.  No C-spine tenderness.  PERRL, EOMI.  Right third finger wrapped.  Vital Signs Review: Vital signs have been reviewed. The oxygen saturation is SpO2: 94 %  which is normal.          Impression/Plan/Medical decision making/ED course: Tier 3 with no outward signs of head injury.  Labs however with DAXA, new leukocytosis.  Patient generally ill-appearing but in no distress.  Patient without clear infectious symptoms however; chest x-ray, UA pending.  Anticipate admission.               ECG review:  ECG independently reviewed by me.  Imaging:  I independently reviewed imaging done in the ED as a wet read.    Disposition: Admit            NOTE: Patient seen during a time of significantly increased volumes, decreased capacity and staff. Portion of management and initial evaluation may have been done while in the waiting room because of this. This document was created using dragon dictation software.  There might be some typographical errors due to this technology.         Gi Winston MD  09/10/23  [id number]

## 2023-09-11 ENCOUNTER — APPOINTMENT (INPATIENT)
Dept: RADIOLOGY | Facility: HOSPITAL | Age: 73
DRG: 683 | End: 2023-09-11
Attending: STUDENT IN AN ORGANIZED HEALTH CARE EDUCATION/TRAINING PROGRAM
Payer: MEDICARE

## 2023-09-11 ENCOUNTER — APPOINTMENT (INPATIENT)
Dept: RADIOLOGY | Facility: HOSPITAL | Age: 73
DRG: 683 | End: 2023-09-11
Attending: SPECIALIST
Payer: MEDICARE

## 2023-09-11 PROBLEM — E11.9 DIABETES MELLITUS (CMS/HCC): Status: ACTIVE | Noted: 2018-10-02

## 2023-09-11 PROBLEM — E11.622: Status: ACTIVE | Noted: 2023-09-10

## 2023-09-11 PROBLEM — L97.909: Status: ACTIVE | Noted: 2023-09-10

## 2023-09-11 LAB
ANION GAP SERPL CALC-SCNC: 10 MEQ/L (ref 3–15)
ATRIAL RATE: 53
BACTERIA URNS QL MICRO: ABNORMAL /HPF
BACTERIA URNS QL MICRO: ABNORMAL /HPF
BASOPHILS # BLD: 0.03 K/UL (ref 0.01–0.1)
BASOPHILS NFR BLD: 0.2 %
BILIRUB UR QL STRIP.AUTO: NEGATIVE MG/DL
BILIRUB UR QL STRIP.AUTO: NEGATIVE MG/DL
BUN SERPL-MCNC: 31 MG/DL (ref 7–25)
CALCIUM SERPL-MCNC: 8.9 MG/DL (ref 8.6–10.3)
CHLORIDE SERPL-SCNC: 98 MEQ/L (ref 98–107)
CHLORIDE UR-SCNC: <15 MEQ/L
CK SERPL-CCNC: 112 U/L (ref 30–223)
CLARITY UR REFRACT.AUTO: CLEAR
CLARITY UR REFRACT.AUTO: CLEAR
CO2 SERPL-SCNC: 29 MEQ/L (ref 21–31)
COLOR UR AUTO: YELLOW
COLOR UR AUTO: YELLOW
CREAT SERPL-MCNC: 2.8 MG/DL (ref 0.7–1.3)
CREAT UR-MCNC: 170.3 MG/DL
CRP SERPL-MCNC: 151.1 MG/L
DIFFERENTIAL METHOD BLD: ABNORMAL
EOSINOPHIL # BLD: 0 K/UL (ref 0.04–0.54)
EOSINOPHIL NFR BLD: 0 %
ERYTHROCYTE [DISTWIDTH] IN BLOOD BY AUTOMATED COUNT: 14.1 % (ref 11.6–14.4)
GFR SERPL CREATININE-BSD FRML MDRD: 22.2 ML/MIN/1.73M*2
GLUCOSE BLD-MCNC: 140 MG/DL (ref 70–99)
GLUCOSE BLD-MCNC: 141 MG/DL (ref 70–99)
GLUCOSE BLD-MCNC: 158 MG/DL (ref 70–99)
GLUCOSE BLD-MCNC: 166 MG/DL (ref 70–99)
GLUCOSE SERPL-MCNC: 201 MG/DL (ref 70–99)
GLUCOSE UR STRIP.AUTO-MCNC: ABNORMAL MG/DL
GLUCOSE UR STRIP.AUTO-MCNC: ABNORMAL MG/DL
HCT VFR BLDCO AUTO: 36.5 % (ref 40.1–51)
HGB BLD-MCNC: 12 G/DL (ref 13.7–17.5)
HGB UR QL STRIP.AUTO: NEGATIVE
HGB UR QL STRIP.AUTO: NEGATIVE
HYALINE CASTS #/AREA URNS LPF: ABNORMAL /LPF
HYALINE CASTS #/AREA URNS LPF: ABNORMAL /LPF
IMM GRANULOCYTES # BLD AUTO: 0.17 K/UL (ref 0–0.08)
IMM GRANULOCYTES NFR BLD AUTO: 0.9 %
KETONES UR STRIP.AUTO-MCNC: ABNORMAL MG/DL
KETONES UR STRIP.AUTO-MCNC: NEGATIVE MG/DL
LEUKOCYTE ESTERASE UR QL STRIP.AUTO: NEGATIVE
LEUKOCYTE ESTERASE UR QL STRIP.AUTO: NEGATIVE
LYMPHOCYTES # BLD: 0.38 K/UL (ref 1.2–3.5)
LYMPHOCYTES NFR BLD: 1.9 %
MCH RBC QN AUTO: 30.7 PG (ref 28–33.2)
MCHC RBC AUTO-ENTMCNC: 32.9 G/DL (ref 32.2–36.5)
MCV RBC AUTO: 93.4 FL (ref 83–98)
MONOCYTES # BLD: 0.88 K/UL (ref 0.3–1)
MONOCYTES NFR BLD: 4.4 %
MUCOUS THREADS URNS QL MICRO: ABNORMAL /LPF
MUCOUS THREADS URNS QL MICRO: ABNORMAL /LPF
NEUTROPHILS # BLD: 18.34 K/UL (ref 1.7–7)
NEUTS SEG NFR BLD: 92.6 %
NITRITE UR QL STRIP.AUTO: NEGATIVE
NITRITE UR QL STRIP.AUTO: NEGATIVE
NRBC BLD-RTO: 0 %
PDW BLD AUTO: 9.9 FL (ref 9.4–12.4)
PH UR STRIP.AUTO: 6 [PH]
PH UR STRIP.AUTO: 6 [PH]
PHOSPHATE SERPL-MCNC: 3 MG/DL (ref 2.4–4.7)
PLATELET # BLD AUTO: 144 K/UL (ref 150–350)
POCT TEST: ABNORMAL
POTASSIUM SERPL-SCNC: 4.6 MEQ/L (ref 3.5–5.1)
POTASSIUM UR-SCNC: 105.3 MEQ/L
PROT UR QL STRIP.AUTO: 1
PROT UR QL STRIP.AUTO: 1
QRS DURATION: 146
QT INTERVAL: 466
QTC CALCULATION(BAZETT): 469
R AXIS: -88
RBC # BLD AUTO: 3.91 M/UL (ref 4.5–5.8)
RBC #/AREA URNS HPF: ABNORMAL /HPF
RBC #/AREA URNS HPF: ABNORMAL /HPF
SODIUM SERPL-SCNC: 137 MEQ/L (ref 136–145)
SODIUM UR-SCNC: 11 MEQ/L
SP GR UR REFRACT.AUTO: >1.03
SP GR UR REFRACT.AUTO: >1.03
SQUAMOUS URNS QL MICRO: ABNORMAL /HPF
SQUAMOUS URNS QL MICRO: ABNORMAL /HPF
T WAVE AXIS: 83
TROPONIN I SERPL HS-MCNC: 393.9 PG/ML
URATE SERPL-MCNC: 8 MG/DL (ref 4.4–7.6)
UROBILINOGEN UR STRIP-ACNC: 0.2 EU/DL
UROBILINOGEN UR STRIP-ACNC: 0.2 EU/DL
VENTRICULAR RATE: 61
WBC # BLD AUTO: 19.8 K/UL (ref 3.8–10.5)
WBC #/AREA URNS HPF: ABNORMAL /HPF
WBC #/AREA URNS HPF: ABNORMAL /HPF

## 2023-09-11 PROCEDURE — 82550 ASSAY OF CK (CPK): CPT | Performed by: SPECIALIST

## 2023-09-11 PROCEDURE — 36415 COLL VENOUS BLD VENIPUNCTURE: CPT | Performed by: INTERNAL MEDICINE

## 2023-09-11 PROCEDURE — 84484 ASSAY OF TROPONIN QUANT: CPT | Performed by: INTERNAL MEDICINE

## 2023-09-11 PROCEDURE — 81003 URINALYSIS AUTO W/O SCOPE: CPT | Performed by: EMERGENCY MEDICINE

## 2023-09-11 PROCEDURE — 76770 US EXAM ABDO BACK WALL COMP: CPT

## 2023-09-11 PROCEDURE — 99222 1ST HOSP IP/OBS MODERATE 55: CPT | Performed by: STUDENT IN AN ORGANIZED HEALTH CARE EDUCATION/TRAINING PROGRAM

## 2023-09-11 PROCEDURE — 84550 ASSAY OF BLOOD/URIC ACID: CPT | Performed by: SPECIALIST

## 2023-09-11 PROCEDURE — 63700000 HC SELF-ADMINISTRABLE DRUG

## 2023-09-11 PROCEDURE — 63700000 HC SELF-ADMINISTRABLE DRUG: Performed by: INTERNAL MEDICINE

## 2023-09-11 PROCEDURE — 82438 ASSAY OTHER FLUID CHLORIDES: CPT | Performed by: SPECIALIST

## 2023-09-11 PROCEDURE — 94660 CPAP INITIATION&MGMT: CPT

## 2023-09-11 PROCEDURE — 20600000 HC ROOM AND CARE INTERMEDIATE/TELEMETRY

## 2023-09-11 PROCEDURE — 87086 URINE CULTURE/COLONY COUNT: CPT | Performed by: STUDENT IN AN ORGANIZED HEALTH CARE EDUCATION/TRAINING PROGRAM

## 2023-09-11 PROCEDURE — 80048 BASIC METABOLIC PNL TOTAL CA: CPT | Performed by: INTERNAL MEDICINE

## 2023-09-11 PROCEDURE — 84133 ASSAY OF URINE POTASSIUM: CPT | Performed by: SPECIALIST

## 2023-09-11 PROCEDURE — 99232 SBSQ HOSP IP/OBS MODERATE 35: CPT | Performed by: STUDENT IN AN ORGANIZED HEALTH CARE EDUCATION/TRAINING PROGRAM

## 2023-09-11 PROCEDURE — 25800000 HC PHARMACY IV SOLUTIONS: Performed by: STUDENT IN AN ORGANIZED HEALTH CARE EDUCATION/TRAINING PROGRAM

## 2023-09-11 PROCEDURE — 73140 X-RAY EXAM OF FINGER(S): CPT | Mod: RT

## 2023-09-11 PROCEDURE — 81001 URINALYSIS AUTO W/SCOPE: CPT | Performed by: EMERGENCY MEDICINE

## 2023-09-11 PROCEDURE — 81001 URINALYSIS AUTO W/SCOPE: CPT | Performed by: STUDENT IN AN ORGANIZED HEALTH CARE EDUCATION/TRAINING PROGRAM

## 2023-09-11 PROCEDURE — 82570 ASSAY OF URINE CREATININE: CPT | Performed by: SPECIALIST

## 2023-09-11 PROCEDURE — 86140 C-REACTIVE PROTEIN: CPT | Performed by: STUDENT IN AN ORGANIZED HEALTH CARE EDUCATION/TRAINING PROGRAM

## 2023-09-11 PROCEDURE — 63600000 HC DRUGS/DETAIL CODE: Mod: JZ | Performed by: INTERNAL MEDICINE

## 2023-09-11 PROCEDURE — 84300 ASSAY OF URINE SODIUM: CPT | Performed by: SPECIALIST

## 2023-09-11 PROCEDURE — 84100 ASSAY OF PHOSPHORUS: CPT | Performed by: SPECIALIST

## 2023-09-11 PROCEDURE — 25800000 HC PHARMACY IV SOLUTIONS: Performed by: INTERNAL MEDICINE

## 2023-09-11 PROCEDURE — 85025 COMPLETE CBC W/AUTO DIFF WBC: CPT | Performed by: INTERNAL MEDICINE

## 2023-09-11 RX ORDER — SODIUM CHLORIDE 9 MG/ML
INJECTION, SOLUTION INTRAVENOUS CONTINUOUS
Status: DISCONTINUED | OUTPATIENT
Start: 2023-09-11 | End: 2023-09-13 | Stop reason: HOSPADM

## 2023-09-11 RX ADMIN — ATORVASTATIN CALCIUM 20 MG: 20 TABLET, FILM COATED ORAL at 09:49

## 2023-09-11 RX ADMIN — COLLAGENASE SANTYL 1 APPLICATION: 250 OINTMENT TOPICAL at 17:55

## 2023-09-11 RX ADMIN — SODIUM CHLORIDE: 9 INJECTION, SOLUTION INTRAVENOUS at 09:49

## 2023-09-11 RX ADMIN — RIVAROXABAN 20 MG: 20 TABLET, FILM COATED ORAL at 17:53

## 2023-09-11 RX ADMIN — GLIMEPIRIDE 4 MG: 2 TABLET ORAL at 09:49

## 2023-09-11 RX ADMIN — GABAPENTIN 300 MG: 300 CAPSULE ORAL at 09:49

## 2023-09-11 RX ADMIN — CETIRIZINE HYDROCHLORIDE 5 MG: 5 TABLET ORAL at 09:49

## 2023-09-11 RX ADMIN — FERROUS SULFATE TAB 325 MG (65 MG ELEMENTAL FE) 325 MG: 325 (65 FE) TAB at 09:48

## 2023-09-11 RX ADMIN — GABAPENTIN 300 MG: 300 CAPSULE ORAL at 20:45

## 2023-09-11 RX ADMIN — INSULIN LISPRO 25 UNITS: 100 INJECTION, SUSPENSION SUBCUTANEOUS at 17:53

## 2023-09-11 RX ADMIN — ACETAMINOPHEN 650 MG: 325 TABLET ORAL at 09:57

## 2023-09-11 RX ADMIN — ACETAMINOPHEN 650 MG: 325 TABLET ORAL at 20:41

## 2023-09-11 RX ADMIN — INSULIN LISPRO 25 UNITS: 100 INJECTION, SUSPENSION SUBCUTANEOUS at 09:51

## 2023-09-11 RX ADMIN — AMPICILLIN AND SULBACTAM 3 G: 2; 1 INJECTION, POWDER, FOR SOLUTION INTRAMUSCULAR; INTRAVENOUS at 15:10

## 2023-09-11 RX ADMIN — PANTOPRAZOLE SODIUM 40 MG: 40 TABLET, DELAYED RELEASE ORAL at 09:49

## 2023-09-11 RX ADMIN — AMPICILLIN AND SULBACTAM 3 G: 2; 1 INJECTION, POWDER, FOR SOLUTION INTRAMUSCULAR; INTRAVENOUS at 23:58

## 2023-09-11 ASSESSMENT — COGNITIVE AND FUNCTIONAL STATUS - GENERAL
CLIMB 3 TO 5 STEPS WITH RAILING: 2 - A LOT
WALKING IN HOSPITAL ROOM: 3 - A LITTLE
CLIMB 3 TO 5 STEPS WITH RAILING: 2 - A LOT
MOVING TO AND FROM BED TO CHAIR: 3 - A LITTLE
MOVING TO AND FROM BED TO CHAIR: 2 - A LOT
STANDING UP FROM CHAIR USING ARMS: 3 - A LITTLE
WALKING IN HOSPITAL ROOM: 2 - A LOT
STANDING UP FROM CHAIR USING ARMS: 2 - A LOT

## 2023-09-11 ASSESSMENT — ENCOUNTER SYMPTOMS
ALTERED MENTAL STATUS: 0
NAIL CHANGES: 0
FEVER: 0
NERVOUS/ANXIOUS: 0
HEMOPTYSIS: 0
HOARSE VOICE: 0
INSOMNIA: 0
DYSPNEA ON EXERTION: 0
HEMATOCHEZIA: 0
WEAKNESS: 0
SPUTUM PRODUCTION: 0
SHORTNESS OF BREATH: 0
PALPITATIONS: 0
SYNCOPE: 0
HEMATEMESIS: 0
ABDOMINAL PAIN: 0
HEMATURIA: 0
ORTHOPNEA: 0
BLURRED VISION: 0
LIGHT-HEADEDNESS: 0
MYALGIAS: 0
COLOR CHANGE: 0
DYSURIA: 0
DIAPHORESIS: 0
VERTIGO: 0
JAUNDICE: 0
WHEEZING: 0

## 2023-09-11 NOTE — CONSULTS
Cardiology Consult Note    Reason for consult: Elevated Troponin     Subjective     Mitchell Sauceda is a 73 y.o. male with PMHx of  Permanent atrial fibrillation since 2018, s/p BSI single chamber PM, HTN, HLD, TED with CPAP, DVT/PE on AC, LVH, GERD. Ambulator dysfunction with motorized chair, chronic lymphedema who was admitted after a fall. Patient stated he was transferring himself from bed to to his wheelchair when he fell.  He denied feeling lightheaded or dizziness or any other symptoms such as chest pain or shortness of breath when the episode happened. He denied loss of consciousness.  He did injure his finger on the right hand.In ED he was noted to have worsening renal function and elevated troponins for which we were consulted.  He denies having any complaints of chest pain or increased shortness of breath in the past couple of days to months.  Patient follows with Dr. Quintana with [facility]       Medical History:   Past Medical History:   Diagnosis Date    Arthritis     GERD (gastroesophageal reflux disease)     Hypertension     Lipid disorder     Neuropathy     Persistent atrial fibrillation (CMS/[facility]) 03/19/2019    Primary osteoarthritis involving multiple joints 05/15/2019    Pulmonary embolism (CMS/[facility])     Type 2 diabetes mellitus (CMS/[facility])     Type 2 diabetes mellitus with hypoglycemia (CMS/[facility]) 10/24/2013    Overview:     Wound healing, delayed        Surgical History:   Past Surgical History:   Procedure Laterality Date    KNEE ARTHROSCOPY Bilateral     VEIN SURGERY Right        Social History:   Social History     Social History Narrative    Not on file      Social History     Tobacco Use   Smoking Status Never   Smokeless Tobacco Never       Family History:   Family History   Problem Relation Age of Onset    Factor V Leiden deficiency Biological Sister     Factor V Leiden deficiency Nephew         Allergies:   Allergies   Allergen Reactions     Vancomycin     Prednisone Hives     Other reaction(s): rash/hives       Current Facility-Administered Medications   Medication Dose Route Frequency Provider Last Rate Last Admin    acetaminophen (TYLENOL) tablet 650 mg  650 mg oral q6h PRN Fransico Stratton MD   650 mg at 09/11/23 0957    ampicillin-sulbactam (UNASYN) IVPB 3 g/100 mL NSS  3 g intravenous q12h INT João Youssef MD        atorvastatin (LIPITOR) tablet 20 mg  20 mg oral Daily Fransico Stratton MD   20 mg at 09/11/23 0949    cetirizine (ZyrTEC) tablet 5 mg  5 mg oral Daily Fransico Stratton MD   5 mg at 09/11/23 0949    glucose chewable tablet 16-32 g of dextrose  16-32 g of dextrose oral PRN Fransico Stratton MD        Or    dextrose 40 % oral gel 15-30 g of dextrose  15-30 g of dextrose oral PRN Fransico Stratton MD        Or    glucagon (GLUCAGEN) injection 1 mg  1 mg intramuscular PRN Fransico Stratton MD        Or    dextrose 50 % in water (D50) injection 12.5 g  25 mL intravenous PRN Fransico Stratton MD        ferrous sulfate tablet 325 mg  325 mg oral Daily with breakfast Fransico Stratton MD   325 mg at 09/11/23 0948    gabapentin (NEURONTIN) capsule 300 mg  300 mg oral BID Fransico Stratton MD   300 mg at 09/11/23 0949    glimepiride (AMARYL) tablet 4 mg  4 mg oral Daily with breakfast Fransico Stratton MD   4 mg at 09/11/23 0949    insulin lispro protamine-lispro (75/25) (HumaLOG MIX 75/25) pen 25 Units  25 Units subcutaneous BID AC Fransico Stratton MD   25 Units at 09/11/23 0951    insulin lispro U-100 (HumaLOG) pen 3-5 Units  3-5 Units subcutaneous TID with meals Fransico Stratton MD   3 Units at 09/10/23 1814    nitroglycerin (NITROSTAT) SL tablet 0.4 mg  0.4 mg sublingual q5 min PRN Fransico Stratton MD        oxyCODONE (ROXICODONE) immediate release tablet 5 mg  5 mg oral q6h PRN Fransico Stratton MD        pantoprazole (PROTONIX) tablet,delayed release (DR/EC) 40 mg  40 mg oral Daily Fransico Stratton MD   40 mg at 09/11/23 0949     rivaroxaban (XARELTO) tablet 20 mg  20 mg oral Daily with dinner Fransico Stratton MD   20 mg at 09/10/23 1651    sodium chloride 0.9 % infusion   intravenous Continuous Yolie Box MD 40 mL/hr at 09/11/23 0949 New Bag at 09/11/23 0949         Review of Systems   Constitutional: Negative for diaphoresis, fever and malaise/fatigue.   HENT: Negative for hoarse voice and nosebleeds.    Eyes: Negative for blurred vision and visual disturbance.   Cardiovascular: Positive for leg swelling. Negative for chest pain, dyspnea on exertion, orthopnea, palpitations and syncope.   Respiratory: Negative for hemoptysis, shortness of breath, sputum production and wheezing.    Endocrine: Negative for cold intolerance and heat intolerance.   Hematologic/Lymphatic: Negative for bleeding problem.   Skin: Negative for color change and nail changes.   Musculoskeletal: Negative for muscle weakness and myalgias.   Gastrointestinal: Negative for abdominal pain, hematemesis, hematochezia and jaundice.   Genitourinary: Negative for dysuria and hematuria.   Neurological: Negative for light-headedness, vertigo and weakness.   Psychiatric/Behavioral: Negative for altered mental status. The patient does not have insomnia and is not nervous/anxious.          Objective       Visit Vitals  BP (!) 138/56 (BP Location: Right upper arm, Patient Position: Lying)   Pulse 60   Temp 36.8 °C (98.2 °F) (Temporal)   Resp 18   Ht 1.829 m (6')   Wt (!) 137 kg (302 lb 4.8 oz)   SpO2 95%   BMI 41.00 kg/m²       Physical Exam  Constitutional:       General: He is not in acute distress.     Appearance: Normal appearance. He is obese.   HENT:      Head: Atraumatic.   Cardiovascular:      Rate and Rhythm: Normal rate and regular rhythm.      Pulses: Normal pulses.      Heart sounds: Normal heart sounds.   Pulmonary:      Effort: Pulmonary effort is normal.      Breath sounds: Normal breath sounds.   Abdominal:      Palpations: Abdomen is soft.   Musculoskeletal:          General: Normal range of motion.      Cervical back: Normal range of motion.      Right lower leg: Edema present.      Left lower leg: Edema present.      Comments: +1 edema of bilateral lower extremities. +2 pedal edema. Erythema to bilateral legs      Skin:     General: Skin is warm and dry.   Neurological:      General: No focal deficit present.      Mental Status: He is alert and oriented to person, place, and time.   Psychiatric:         Mood and Affect: Mood normal.          Labs   Lab Results   Component Value Date    WBC 19.80 (H) 09/11/2023    HGB 12.0 (L) 09/11/2023    HCT 36.5 (L) 09/11/2023     (L) 09/11/2023    CHOL 137 06/13/2019    TRIG 99 06/13/2019    HDL 41 06/13/2019    LDLCALC 78 06/13/2019    ALT 13 09/10/2023    AST 17 09/10/2023     09/11/2023    K 4.6 09/11/2023    CL 98 09/11/2023    CREATININE 2.8 (H) 09/11/2023    BUN 31 (H) 09/11/2023    CO2 29 09/11/2023    TSH 2.49 07/10/2018    PSA 0.58 01/31/2022    INR 2.8 (H) 04/17/2015    GLUCOSE 201 (H) 09/11/2023    HGBA1C 7.4 05/19/2023    MICROALBUR 1.1 06/13/2019       Imaging  CXR 9/10/2023  IMPRESSION:  Cannot exclude mild pulmonary vascular congestion.  Otherwise limited study reveals no definite acute disease.    Cardiac Imaging    ECHOCARDIOGRAM 2014  EF 60%, Left ventricular wall motion is probably normal. Mild concentric left ventricular hypertrophy, left atrium is mildly dilated. Mild TR. Mild pulmonary hypertension. No significant pericardial effusion       ECG   9/10/2023 Ventricular paced rhythm     Telemetry  Sinus Rhythm , 3 beat NSVT     Assessment & Plan    Fall  Assessment & Plan  Mechanical per description, had no complaints of chest pain, lightheadedness or dizziness  Denied LOC  Paced rhythm on EKG    Elevated troponin I level  Assessment & Plan  Elevated in the setting of fall and poor renal clearance   He denied any complaints of chest pain prior to event  EKG with paced rhythm     Not concerning for  ACS  Echocardiogram  Recommend outpatient follow up with his primary cardiologist     Chronic systolic congestive heart failure (CMS/[facility])  Assessment & Plan  He denies any worsening dyspnea. He does not check weights at home  Has chronic lymphedema  Bumex is on hold given DAXA, Cr 2.5 during his last hospitalization in May but baseline is in the high one's per nephrology note  Jardiance on hold     Monitor Cr and continue to hold bumex for now  Monitor for urine retention     Persistent atrial fibrillation (CMS/[facility])  Assessment & Plan  S/p Pacemaker  Continue xarelto, if creatinine remains elevated will need to decrease dose to 15 given lower CrCl    Benign essential hypertension  Assessment & Plan  Mostly controlled  Holding Bumex and Lisinopril     * DAXA (acute kidney injury) (CMS/[facility])  Assessment & Plan  Nephrology consulted  Bumex on hold          This patient note has been dictated using speech recognition software. Inadvertent speech recognition errors should be disregarded. Please do not hesitate to call my office for clarifications.    BRENDAN Todd  9/11/2023  11:36 AM

## 2023-09-11 NOTE — PLAN OF CARE
Care Coordination Admission Assessment Note    General Information:  Readmission Within the last 30 days: no previous admission in last 30 days  Does patient have a :    Patient-Specific Goals (include timeframe): Patient wants to go home    Living Arrangements:  Arrived From: home  Current Living Arrangements: home  People in Home: spouse  Home Accessibility:    Living Arrangement Comments: Patient lives with spouse in a 2sh home with first floor set up - handicapped equipped BR.  Patient does not drive    Housing Stability and Financial Resources (SDOH):  In the last 12 months, was there a time when you were not able to pay the mortgage or rent on time?: No  In the last 12 months, how many places have you lived?:    In the last 12 months, was there a time when you did not have a steady place to sleep or slept in a shelter (including now)?: No  How hard is it for you to pay for the very basics like food, housing, medical care, and heating?: Not hard at all    Functional Status Prior to Admission:   Assistive Device/Animal Currently Used at Home: walker, front-wheeled  Functional Status Comments:    IADL Comments:       Supports and Services:  Current Outpatient/Agency/Support Group: none  Type of Current Home Care Services:    History of home care episode or rehab stay:      Discharge Needs Assessment:   Concerns to be Addressed: discharge planning  Current Discharge Risk:    Anticipated Changes Related to Illness: inability to care for someone else    Patient/Family Anticipated Discharge Plan:  Patient/Family Anticipates Transition To: home with family  Patient/Family Anticipated Services at Transition:      Connection to Community  Not applicable      Patient Choice:   Offered/Gave Vendor List: no  Patient's Choice of Community Agency(s):         Anticipated Discharge Plan:  Met with patient. Provided education and contact information for Care Coordination services.: yes  Anticipated Discharge  Disposition: home with home health  Type of Home Care Services: home OT, home PT, infusion therapy, nursing      Transportation Needs (SDOH):  Transportation Concerns: no car  Transportation Anticipated: agency, family or friend will provide  Is Out of Hospital DNR needed at discharge?: no    In the past 12 months, has lack of transportation kept you from medical appointments or from getting medications?: No  In the past 12 months, has lack of transportation kept you from meetings, work, or from getting things needed for daily living?: No    Concerns - comments: Patient is r/o osteo and is pending MRI to confirm - IV ab - may need long term abx/PICC - homecare with home infusion?

## 2023-09-11 NOTE — ASSESSMENT & PLAN NOTE
Mechanical per description, had no complaints of chest pain, lightheadedness or dizziness  Denied LOC  Paced rhythm on EKG

## 2023-09-11 NOTE — PLAN OF CARE
Problem: Adult Inpatient Plan of Care  Goal: Plan of Care Review  9/11/2023 0400 by Marilyn Gonzales, RN  Flowsheets (Taken 9/11/2023 0400)  Plan of Care Reviewed With: patient  9/11/2023 0356 by Marilyn Gonzales, RN  Flowsheets (Taken 9/11/2023 0356)  Progress: no change  Outcome Evaluation: AAOx4. VSS. Denies pain or discomfort. Fall precautions in place/bed alarm intact. Plan for Echo today.  Plan of Care Reviewed With: patient  9/11/2023 0356 by Marilyn Gonzales, RN  Outcome: Progressing   Plan of Care Review  Plan of Care Reviewed With: patient  Progress: no change  Outcome Evaluation: AAOx4. VSS. Denies pain or discomfort. Fall precautions in place/bed alarm intact. Plan for Echo today.

## 2023-09-11 NOTE — ASSESSMENT & PLAN NOTE
He denies any worsening dyspnea. He does not check weights at home  Has chronic lymphedema  Bumex is on hold given DAXA, Cr 2.5 during his last hospitalization in May but baseline is in the high one's per nephrology note  Jardiance on hold     Monitor Cr and continue to hold bumex for now  Monitor for urine retention

## 2023-09-11 NOTE — CONSULTS
NEPHROLOGY CONSULTATION    Reason for consult: Acute kidney injury, chronic kidney disease stage IIIb, proteinuria, diabetic nephropathy, status post fall    Requesting the consult: Dr. Fransico Stratton    HPI: Thank you for this consultation on Mitchell Sauceda who is a 73 y.o. male, known to us from the outpatient office where he is followed by my partner, Dr. Mcmullen for chronic kidney disease stage IIIb due to diabetic nephropathy, mild proteinuria, presents to the hospital on 9/10/2023 after a fall at home.  He reported right shoulder pain.  He was seen by orthopedic surgery for a right third digit injury and distal phalanx laceration after a fall off his bed.  Imaging demonstrated third middle phalanx intra-articular fracture treated with closed reduction and AlumaFoam splint.  No urgent operative fixation is necessary at this point.  Nephrology are asked to comment on the patient's acute kidney injury.  Creatinine is up to 2.8 today.  He denies dysuria, hematuria, urgency, frequency, diminished urine output.      Review of Systems  A 10-point review of systems was negative except as above.  Past Medical History:   Diagnosis Date    Arthritis     GERD (gastroesophageal reflux disease)     Hypertension     Lipid disorder     Neuropathy     Persistent atrial fibrillation (CMS/[facility]) 03/19/2019    Primary osteoarthritis involving multiple joints 05/15/2019    Pulmonary embolism (CMS/[facility])     Type 2 diabetes mellitus (CMS/[facility])     Type 2 diabetes mellitus with hypoglycemia (CMS/[facility]) 10/24/2013    Overview:     Wound healing, delayed        Past Surgical History:   Procedure Laterality Date    KNEE ARTHROSCOPY Bilateral     VEIN SURGERY Right        Social History     Tobacco Use    Smoking status: Never    Smokeless tobacco: Never   Substance Use Topics    Alcohol use: No       Family History   Problem Relation Age of Onset    Factor V Leiden deficiency Biological Sister     Factor V Leiden deficiency  Nephew        Allergies   Allergen Reactions    Vancomycin     Prednisone Hives     Other reaction(s): rash/hives       Home Medication List:    ascorbic acid, Take 1 tablet by mouth daily.    atorvastatin, Take 1 tablet (20 mg total) by mouth daily.    bumetanide, Take 1 mg by mouth 3 (three) times a day.    cyanocobalamin, Take 1,000 tablets by mouth daily.    dapagliflozin, Take 10 mg by mouth daily.    FREESTYLE BURT 2 READER, Check BG as directed. Change every 2 weeks. DX: Z79.4, E 13.40    FREESTYLE BURT 2 SENSOR, Check BG as directed DX: Z79.4, E 13.40    gabapentin, TAKE 1 CAPSULE BY MOUTH TWICE A DAY    glimepiride, Take 6 mg by mouth daily with breakfast. (1.5 Tablets PO QD)    insulin lispro protamin/lispro (HUMALOG MIX 75-25 KWIKPEN SUBQ), Inject 25 Units under the skin 2 (two) times a day.    KLOR-CON M20, TAKE 1 TABLET BY MOUTH 2 TIMES A DAY.    lancets, for blood glucose monitoring 3x day    lisinopriL, Take 1 tablet (2.5 mg total) by mouth daily.    pantoprazole, Take 1 tablet (40 mg total) by mouth daily.    PEN NEEDLE, DIABETIC (BD ULTRA-FINE MICRO PEN NEEDLE MISC), once daily use with Levemir    rivaroxaban, Take 1 tablet (20 mg total) by mouth daily with dinner.    semaglutide, Inject 1 mg under the skin every (seven) 7 days.      Current Facility-Administered Medications:     acetaminophen (TYLENOL) tablet 650 mg, 650 mg, oral, q6h PRN, Fransico Stratton MD, 650 mg at 09/11/23 0957    atorvastatin (LIPITOR) tablet 20 mg, 20 mg, oral, Daily, Fransico Stratton MD, 20 mg at 09/11/23 0949    cefTRIAXone (ROCEPHIN) IVPB 1 g in 100 mL NSS vial in bag, 1 g, intravenous, q24h INT, Fransico Stratton MD, Stopped at 09/10/23 1810    cetirizine (ZyrTEC) tablet 5 mg, 5 mg, oral, Daily, Fransico Stratton MD, 5 mg at 09/11/23 0949    glucose chewable tablet 16-32 g of dextrose, 16-32 g of dextrose, oral, PRN **OR** dextrose 40 % oral gel 15-30 g of dextrose, 15-30 g of dextrose, oral, PRN  **OR** glucagon (GLUCAGEN) injection 1 mg, 1 mg, intramuscular, PRN **OR** dextrose 50 % in water (D50) injection 12.5 g, 25 mL, intravenous, PRN, Fransico Stratton MD    ferrous sulfate tablet 325 mg, 325 mg, oral, Daily with breakfast, Fransico Stratton MD, 325 mg at 09/11/23 0948    gabapentin (NEURONTIN) capsule 300 mg, 300 mg, oral, BID, Fransico Stratton MD, 300 mg at 09/11/23 0949    glimepiride (AMARYL) tablet 4 mg, 4 mg, oral, Daily with breakfast, Fransico Stratton MD, 4 mg at 09/11/23 0949    insulin lispro protamine-lispro (75/25) (HumaLOG MIX 75/25) pen 25 Units, 25 Units, subcutaneous, BID AC, Fransico Stratton MD, 25 Units at 09/11/23 0951    insulin lispro U-100 (HumaLOG) pen 3-5 Units, 3-5 Units, subcutaneous, TID with meals, Fransico Stratton MD, 3 Units at 09/10/23 1814    nitroglycerin (NITROSTAT) SL tablet 0.4 mg, 0.4 mg, sublingual, q5 min PRN, Fransico Stratton MD    oxyCODONE (ROXICODONE) immediate release tablet 5 mg, 5 mg, oral, q6h PRN, Fransico Stratton MD    pantoprazole (PROTONIX) tablet,delayed release (DR/EC) 40 mg, 40 mg, oral, Daily, Fransico Stratton MD, 40 mg at 09/11/23 0949    rivaroxaban (XARELTO) tablet 20 mg, 20 mg, oral, Daily with dinner, Fransico Stratton MD, 20 mg at 09/10/23 1651    sodium chloride 0.9 % infusion, , intravenous, Continuous, Yolie Box MD, Last Rate: 40 mL/hr at 09/11/23 0949, New Bag at 09/11/23 0949    PHYSICAL EXAM:  Vitals:    09/10/23 2303 09/11/23 0320 09/11/23 0600 09/11/23 0728   BP: (!) 118/57  125/67 (!) 138/56   BP Location: Right upper arm  Right upper arm Right upper arm   Patient Position: Lying  Lying Lying   Pulse: 60 61 61 60   Resp:   18    Temp: 36.6 °C (97.9 °F)  36.9 °C (98.5 °F) 36.8 °C (98.2 °F)   TempSrc: Temporal  Temporal Temporal   SpO2: 93%  96% 95%   Weight:       Height:           Lying in bed, in no distress, comfortable  Normal extraocular movements, pink conjunctivae, moist mucous membranes   No JVD, midline trachea, no  thyromegaly  No lymph nodes in the axillary or cervical area   Lungs are clear to auscultation bilaterally   Regular rate and rhythm, no murmurs rubs or gallops  Abdomen is soft, nontender, without suprapubic tenderness, with positive bowel sounds  No peripheral edema  Able to move all extremities  Nonfocal neurologic examination   Normal skin turgor  Appropriate mood and affect          Intake/Output Summary (Last 24 hours) at 9/11/2023 1031  Last data filed at 9/11/2023 0000  Gross per 24 hour   Intake 753 ml   Output 300 ml   Net 453 ml       Physical Exam    Results from last 7 days   Lab Units 09/11/23  0013 09/10/23  1112   SODIUM mEQ/L 137 137   POTASSIUM mEQ/L 4.6 4.4   CHLORIDE mEQ/L 98 98   CO2 mEQ/L 29 28   BUN mg/dL 31* 27*   CREATININE mg/dL 2.8* 2.5*   EGFR mL/min/1.73m*2 22.2* 25.3*   GLUCOSE mg/dL 201* 194*   CALCIUM mg/dL 8.9 9.5   ALBUMIN g/dL  --  3.9   WBC K/uL 19.80* 17.74*   HEMOGLOBIN g/dL 12.0* 13.5*   HEMATOCRIT % 36.5* 41.2   PLATELETS K/uL 144* 157       Pertinent radiology and labs reviewed    ASSESSMENT:  Principal Problem:    Fall  Active Problems:    Mixed hyperlipidemia    Benign essential hypertension    Sleep apnea    Persistent atrial fibrillation (CMS/HCC)    History of pulmonary embolism    Chronic systolic congestive heart failure (CMS/HCC)    DAXA (acute kidney injury) (CMS/[facility])    Elevated troponin I level    Leukocytosis    Fracture of right hand      PLAN:  1. Chronic kidney disease stage IIIb-creatinine in the high ones per our office records.  Continue to follow-up with Dr. Mcmullen  2. Diabetic nephropathy with low-grade proteinuria-Farxiga 10 mg orally daily started last office visit.  Would hold while in acute renal failure.  Should be restarted as an outpatient.  3. Acute kidney injury- assessment in progress.  Status post fall.  Check kidney ultrasound, he is on rivaroxaban.  PVR, straight cath for PVR more than 350.  4. Third middle phalanx intra-articular fracture-per  orthopedic surgery  James Brown MD

## 2023-09-11 NOTE — ASSESSMENT & PLAN NOTE
Elevated in the setting of fall and poor renal clearance   He denied any complaints of chest pain prior to event  EKG with paced rhythm     Not concerning for ACS  Echocardiogram  Recommend outpatient follow up with his primary cardiologist

## 2023-09-11 NOTE — CONSULTS
Nutrition Status Classification: Mild nutritional compromise     Clinical Course: Patient is a 73 y.o. male who was admitted on 9/10/2023 with a diagnosis of Cardiac pacemaker [Z95.0]  DAXA (acute kidney injury) (CMS/HCC) [N17.9]  Chronic systolic congestive heart failure (CMS/[facility]) [I50.22]  Open nondisplaced fracture of distal phalanx of right middle finger, initial encounter [S62.662B]  Fall, initial encounter [W19.XXXA].     Past Medical History:   Diagnosis Date    Arthritis     GERD (gastroesophageal reflux disease)     Hypertension     Lipid disorder     Neuropathy     Persistent atrial fibrillation (CMS/[facility]) 03/19/2019    Primary osteoarthritis involving multiple joints 05/15/2019    Pulmonary embolism (CMS/HCC)     Type 2 diabetes mellitus (CMS/HCC)     Type 2 diabetes mellitus with hypoglycemia (CMS/HCC) 10/24/2013    Overview:     Wound healing, delayed      Past Surgical History:   Procedure Laterality Date    KNEE ARTHROSCOPY Bilateral     VEIN SURGERY Right        Nutrition Interventions/Recommendations:     1. Continue Cardiac diet, added [id number] diet   - monitor tolerance/ intakes  - consider ONS if PO intakes <50%   2. Monitor labs/lytes  - replete PRN   - trend kidney fx  - POC checks q 6hrs - goal -180 mg/dl; POC: 158-197 x 24 hr   3. Daily weights  - bed zero'd on visit today.   4. Monitor skin integrity  - WCON eval with /L buttocks and perineum with chronic moisture stained tissue with intact scarring noted vs st 1 and chronic unstagable L heel ulcer with hx skin graft.    - consider MVI   5. Monitor GI function  - LBM: 9/10           Dietary Orders   (From admission, onward)             Start     Ordered    09/11/23 1351  Adult Diet Regular; Consistent Carbohydrate 2200; Cardiac (Low Sodium/Low Fat); RD/LDN may adjust order  Diet effective now        References:    IDDSI Diet reference   Question Answer Comment   Diet Texture Regular    Diabetic Restrictions: Consistent  Carbohydrate 2200    Other Restriction(s): Cardiac (Low Sodium/Low Fat)    Delegation of Authority. Diet orders written by PA/Mark may not be adjusted by RD/LDNs. RD/LDN may adjust order        09/11/23 1351                Reason for Assessment  Reason For Assessment: nurse/nurse practitioner consult  Diagnosis:  (Fall)    [facility] Nutrition Screen Tool  Has patient lost weight without trying?: 0-->No  Has patient been eating poorly due to decreased appetite?: 0-->No  [facility] Nutrition Screen Score: 0    Nutrition/Diet History  Typical Food/Fluid Intake: From home with wife at baseline  Diet Prior to Admission: Regular- watches carbs  Appetite Prior to Admission: Good-50-75%  Meal/Snack Patterns: brunch at noon/ dinner at 6  Supplemental Drinks/Foods/Additives: none  Vitamin/Mineral/Herbal Supplements: none  Functional Status: other (see comments) (wife prepares meals)  Food Allergies: no known food allergies    Physical Findings  Overall Physical Appearance: obese  Last Bowel Movement: 09/10/23  Skin: non-healing wound(s), venous stasis ulcer(s)    Last Bowel Movement: 09/10/23    Nutrition Order  Nutrition Order: meets nutritional requirements  Nutrition Order Comments: cardiac    Anthropometrics  Height: 182.9 cm (6')           Current Weight  Weight Method: Bed scale  Weight: (!) 137 kg (302 lb 4.8 oz)    Ideal Body Weight (IBW)  Ideal Body Weight (IBW) (kg): 82.07  % Ideal Body Weight: 167.08    Usual Body Weight (UBW)  Usual Body Weight: 141 kg (310 lb)    Body Mass Index (BMI)  BMI (Calculated): 41      Labs/Procedures/Meds  Lab Results Reviewed: reviewed  Lab Results Comments: see note      Results from last 7 days   Lab Units 09/11/23  0013 09/10/23  1112   SODIUM mEQ/L 137 137   POTASSIUM mEQ/L 4.6 4.4   CHLORIDE mEQ/L 98 98   CO2 mEQ/L 29 28   BUN mg/dL 31* 27*   CREATININE mg/dL 2.8* 2.5*   GLUCOSE mg/dL 201* 194*   CALCIUM mg/dL 8.9 9.5      Results from last 7 days   Lab Units 09/10/23  1112   ALK PHOS IU/L  132*   BILIRUBIN TOTAL mg/dL 1.1   ALBUMIN g/dL 3.9   ALT IU/L 13   AST IU/L 17          Lab Results   Component Value Date    HGBA1C 7.4 05/19/2023    HGBA1C 11.3 (H) 01/31/2022    HGBA1C 6.6 (H) 06/13/2019     No results found for: [id number]  Lab Results   Component Value Date    CALCIUM 8.9 09/11/2023    PHOS 3.0 09/11/2023     Results from last 7 days   Lab Units 09/11/23  0013 09/10/23  1112   WBC K/uL 19.80* 17.74*   HEMOGLOBIN g/dL 12.0* 13.5*   HEMATOCRIT % 36.5* 41.2   PLATELETS K/uL 144* 157               Results from last 7 days   Lab Units 09/10/23  1112   MAGNESIUM mg/dL 2.0          Diagnostic Tests/Procedures  Diagnostic Test/Procedure Reviewed: reviewed  Diagnostic Test/Procedures Comments: see note    Medications  Pertinent Medications Reviewed: reviewed  Pertinent Medications Comments: see note   ampicillin-sulbactam  3 g intravenous q12h INT    atorvastatin  20 mg oral Daily    cetirizine  5 mg oral Daily    ferrous sulfate  325 mg oral Daily with breakfast    gabapentin  300 mg oral BID    glimepiride  4 mg oral Daily with breakfast    insulin lispro protamine-lispro (75/25)  25 Units subcutaneous BID AC    insulin lispro U-100  3-5 Units subcutaneous TID with meals    pantoprazole  40 mg oral Daily    rivaroxaban  20 mg oral Daily with dinner      sodium chloride 0.9 %   40 mL/hr at 09/11/23 0949         Weights (last 7 days)     Date/Time Weight    09/10/23 1606 137 kg (302 lb 4.8 oz)    09/10/23 1100 142 kg (312 lb)        2/15/2023 320 lb Abnormally high   145.151 kg Abnormally high   -   1/31/2022 316 lb 12.8 oz Abnormally high   143.7 kg Abnormally high   -         Clinical Comments:    RN consult received for wounds. Pt is 73-year-old male with PMHx: GERD, HTN, HLD, atrial fibrillation, CHF, DM2 ( last A1c 7.4%) presenting to the emergency department after a fall from his bed. Pt is being treated for DAXA and R hand fx in addition to his chronic heel wound, awaiting podiatry  consult. Pt ordered Cardiac diet, [id number] added to restrictions for BG control.      Met with patient OOB in chair. He had just finished lunch. >75% of meal eaten. Pt reports he was very hungry d/t not eating breakfast. Provided pt with DM menu to look over for dinner. Pt reports at baseline he eats 2 meals/ day- brunch around noon and dinner around 6. His wife does the cooking, reports that they monitor carbs/ DM diet recommendations. Reports a balanced diet. Reviewed protein intakes as it relates to wound healing. Pt denies using ONS or protein supplementation at baseline. Pt reports UBW of 310 lbs and was surprised by wt measurement of 302 yesterday- noted 2 wts measured yesterday' 312 vs 302 lbs. Bed scale zero'd on visit, will monitor for updated weight. Reports that his heel wound is >7 years old. Has had hx of grafting. Labs/meds reviewed. Will continue to trend kidney fx. Nutrition recs as outlined above, will follow.     Date: 09/11/23  Signature: VEE Watters

## 2023-09-11 NOTE — CONSULTS
Wound Ostomy Continence Note    Subjective    HPI Patient is a 73 y.o. male who was admitted on 9/10/2023 with a diagnosis of Cardiac pacemaker [Z95.0]  DAXA (acute kidney injury) (CMS/[facility]) [N17.9]  Chronic systolic congestive heart failure (CMS/[facility]) [I50.22]  Open nondisplaced fracture of distal phalanx of right middle finger, initial encounter [S62.662B]  Fall, initial encounter [W19.XXXA].    Problem list Principal Problem:    DAXA (acute kidney injury) (CMS/[facility])  Active Problems:    Mixed hyperlipidemia    Benign essential hypertension    Diabetes mellitus (CMS/[facility])    Morbid obesity (CMS/[facility])    Sleep apnea    Persistent atrial fibrillation (CMS/[facility])    History of pulmonary embolism    Chronic systolic congestive heart failure (CMS/[facility])    Elevated troponin I level    Fall    Leukocytosis    Fracture of right hand     PMH/PSH Past Medical History:   Diagnosis Date    Arthritis     GERD (gastroesophageal reflux disease)     Hypertension     Lipid disorder     Neuropathy     Persistent atrial fibrillation (CMS/[facility]) 03/19/2019    Primary osteoarthritis involving multiple joints 05/15/2019    Pulmonary embolism (CMS/[facility])     Type 2 diabetes mellitus (CMS/[facility])     Type 2 diabetes mellitus with hypoglycemia (CMS/[facility]) 10/24/2013    Overview:     Wound healing, delayed      Past Surgical History:   Procedure Laterality Date    KNEE ARTHROSCOPY Bilateral     VEIN SURGERY Right       Assessment and Recommendation     Consult by nursing for wound care recs. Patient noted to have unstageable PI on left heel, had skin grafting and had been treated for OM in past. Patient has pending Podiatry team consult-will defer to their expertise for mgmt as to not duplicate care. Xerosis of B/L LEs and feet. Left groin noted to have linear, partial thickness tissue loss in crease of abdominal fold. B/L buttocks and perineum with chronic moisture stained tissue with intact scarring noted. Michael score at time of last bedside  "nursing assessment-15. Recs noted below. Wound care to sign off, please re consult if needed.     Maintain PI PREVENT bundle, where applicable    Utilize zinc barrier ointment to perineum/buttocks/adominal and groin skin folds BID and PRN  Keep skin clean and dry, avoid incontinence brief use if able.     Left heel (until seen by Podiatry team): apply silicone bordered foam dressing-change 2x/wk and PRN but assess under dressing q shift. Offload       Wound Prevention Bundle                                                             Positioning- If clinically able:  Reposition at a minimum of 2 hours.  Adjust to avoid lying on medical devices. Use positioning devices to offload bony prominences. When out of bed, use chair cushions. Monitor time OOB and encourage repositioning. Use transfer aids to reduce friction and shear. Use \"turn assist.\"                   Risk Assessment:              Utilize risk assessment scale per hospital guidelines.  Identify additional risk factors, for example, medications and comorbidities. Match interventions to subscales of hospital's Risk assessment scale.  communicate risk to interdisciplinary healthcare team.                   Evaluate Surface/Pressure Redistribution:              Consider specialty sleep surface according to patient need.   Utilize \"less is best\" with linen usage.                   Vigilant Skin Inspection:              Inspect skin from head to toe, including areas under removable medical devices and dressings. Communicate skin issues to healthcare team and consult resources as needed.                              Evaluate incontinence/moisture/temperature:              Offer toileting when appropriate. Keep skin clean and dry (microclimate). Use moisture barrier products for incontinence care. Diapers/briefs for out of bed or off unit. Use absorbent under pads that wick away and hold moisture. Intervene for fecal and/or urinary incontinence (consider external " containment devices. Use skin emollients (dry skin).                             Nutrition:              Consult dietician for at risk patients. Assist with menu preferences and feeding. Encourage snacks, fluid and supplements with rounding. Accurately record all intake where indicated.                       Teaching Patients and Families:              Encourage patients and families to partner with staff in preventing pressure injuries. Give patients and families pressure injury education, assess their understanding of education given, and document education.       Date: 09/11/23  Signature: Estelle Nettles RN

## 2023-09-11 NOTE — ASSESSMENT & PLAN NOTE
S/p Pacemaker  Continue xarelto, if creatinine remains elevated will need to decrease dose to 15 given lower CrCl

## 2023-09-11 NOTE — CONSULTS
Infectious Disease Consult Note    Patient Name: Mitchell Sauceda  MR#: [id number]  : 1950  Admission Date: 9/10/2023  Consult Date: 23 11:05 AM   Consultant: João Youssef MD    Reason for Consult: leucocytosis, low grade temperature, no obvious source of infection  Referring Provider: Yolie Box      History of Present Illness     Mitchell Sauceda is a 73 y.o. male with PMH of GERD, HTN, HLD, AF, DM who was admitted on 9/10/2023 s/p fall and sustained injury to R middle finger. He had has frequent falls and was admitted at [facility] in July for L heel chronic wound infection with wound being open for many years and underwent skin grafting along with receiving 21 day course of amox-clav for cx resulting in Enterococcus and mixed growth after initial recommendation was for IV therapy after MRI showed soft tissue infection extending to the edge of the calcaneous but no osteomyelitis noted. He followed up with wound care on Friday. He denies any fever, cough, dyspnea, chest pain, abdominal pain, nausea, vomiting, diarrhea, dysuria or skin rashes. On admission, he was afebrile with WBC count of 19K. CXR showed mild vascular congestion. He was started on ceftriaxone with blood cx in process.    Outside records were reviewed.    Allergies:   Allergies   Allergen Reactions    Vancomycin     Prednisone Hives     Other reaction(s): rash/hives       Medical History:   Past Medical History:   Diagnosis Date    Arthritis     GERD (gastroesophageal reflux disease)     Hypertension     Lipid disorder     Neuropathy     Persistent atrial fibrillation (CMS/[facility]) 2019    Primary osteoarthritis involving multiple joints 05/15/2019    Pulmonary embolism (CMS/[facility])     Type 2 diabetes mellitus (CMS/[facility])     Type 2 diabetes mellitus with hypoglycemia (CMS/[facility]) 10/24/2013    Overview:     Wound healing, delayed        Surgical History:   Past Surgical History:   Procedure Laterality Date    KNEE  ARTHROSCOPY Bilateral     VEIN SURGERY Right        Social History:   Social History     Socioeconomic History    Marital status:    Tobacco Use    Smoking status: Never    Smokeless tobacco: Never   Substance and Sexual Activity    Alcohol use: No     Social Determinants of Health     Food Insecurity: No Food Insecurity (9/10/2023)    Hunger Vital Sign     Worried About Running Out of Food in the Last Year: Never true     Ran Out of Food in the Last Year: Never true          Travel Exposure:   Travel and Exposure Screening    Flowsheet Row Most Recent Value   Travel Screening    Overnight hospitalization outside the [place] in the last year? No Filed On: 09/10/2023 1837   Exposure Screening    Symptoms         Family History:   Family History   Problem Relation Age of Onset    Factor V Leiden deficiency Biological Sister     Factor V Leiden deficiency Nephew        Review of Systems    All other systems reviewed and negative except as noted in the HPI.    Medications:    Current IP Meds (From admission, onward)        Frequency     sodium chloride 0.9 % infusion         Continuous     ferrous sulfate tablet 325 mg         Daily with breakfast     glimepiride (AMARYL) tablet 4 mg         Daily with breakfast     gabapentin (NEURONTIN) capsule 300 mg         2 times daily     rivaroxaban (XARELTO) tablet 15 mg  Status:  Discontinued         Daily with dinner     insulin lispro U-100 (HumaLOG) pen 3-5 Units  (Humalog Insulin Correction Factor for patient weight < 200 lb)         3 times daily with meals     rivaroxaban (XARELTO) tablet 20 mg         Daily with dinner     cefTRIAXone (ROCEPHIN) IVPB 1 g in 100 mL NSS vial in bag         Every 24 hours interval     oxyCODONE (ROXICODONE) immediate release tablet 5 mg         Every 6 hours PRN     acetaminophen (TYLENOL) tablet 650 mg         Every 6 hours PRN     insulin lispro protamine-lispro (75/25) (HumaLOG MIX 75/25) pen 25 Units         2 times daily  before breakfast and dinner     cetirizine (ZyrTEC) tablet 5 mg         Daily     glucose chewable tablet 16-32 g of dextrose  (Hypoglycemia Treatment Protocol and Hyperglycemia Validation Protocol)  Status:  Discontinued        See Hyperspace for full Linked Orders Report.    As needed     dextrose 40 % oral gel 15-30 g of dextrose  (Hypoglycemia Treatment Protocol and Hyperglycemia Validation Protocol)  Status:  Discontinued        Note to Pharmacy: Pharmacist: Health system is the depot location for this medication.   See Hyperspace for full Linked Orders Report.    As needed     glucagon (GLUCAGEN) injection 1 mg  (Hypoglycemia Treatment Protocol and Hyperglycemia Validation Protocol)  Status:  Discontinued        See Hyperspace for full Linked Orders Report.    As needed     dextrose 50 % in water (D50) injection 12.5 g  (Hypoglycemia Treatment Protocol and Hyperglycemia Validation Protocol)  Status:  Discontinued        See Hyperspace for full Linked Orders Report.    As needed     atorvastatin (LIPITOR) tablet 20 mg         Daily     pantoprazole (PROTONIX) tablet,delayed release (DR/EC) 40 mg         Daily     aspirin chewable tablet 324 mg         Once     oxyCODONE (ROXICODONE) immediate release tablet 5 mg  Status:  Discontinued         Every 6 hours PRN     nitroglycerin (NITROSTAT) SL tablet 0.4 mg         Every 5 min PRN     glucose chewable tablet 16-32 g of dextrose  (Hypoglycemia Treatment Protocol and Hyperglycemia Validation Protocol)        See Hyperspace for full Linked Orders Report.    As needed     dextrose 40 % oral gel 15-30 g of dextrose  (Hypoglycemia Treatment Protocol and Hyperglycemia Validation Protocol)        Note to Pharmacy: Pharmacist: Health system is the depot location for this medication.   See Hyperspace for full Linked Orders Report.    As needed     glucagon (GLUCAGEN) injection 1 mg  (Hypoglycemia Treatment Protocol and Hyperglycemia Validation Protocol)        See Hyperspace for full Linked  Orders Report.    As needed     dextrose 50 % in water (D50) injection 12.5 g  (Hypoglycemia Treatment Protocol and Hyperglycemia Validation Protocol)        See Hyperspace for full Linked Orders Report.    As needed     ceFAZolin (ANCEF) 1 g/50 mL NSS  (IV Antibiotics)         Once     tetanus,diphth,pertus(acell) (BOOSTRIX) vaccine 0.5 mL  (Wound / Trauma)         Once          Anti-infectives (From admission, onward)    Start     Dose/Rate Route Frequency Ordered Stop    09/10/23 1700  cefTRIAXone (ROCEPHIN) IVPB 1 g in 100 mL NSS vial in bag         1 g  200 mL/hr over 30 Minutes intravenous Every 24 hours interval 09/10/23 1633              Objective     Vital Signs:    Patient Vitals for the past 72 hrs:   BP Temp Temp src Pulse Resp SpO2 Height Weight   23 0728 (!) 138/56 36.8 °C (98.2 °F) Temporal 60 -- 95 % -- --   23 0600 125/67 36.9 °C (98.5 °F) Temporal 61 18 96 % -- --   23 0320 -- -- -- 61 -- -- -- --   09/10/23 2303 (!) 118/57 36.6 °C (97.9 °F) Temporal 60 -- 93 % -- --   09/10/23 2055 (!) 120/59 37.7 °C (99.9 °F) Temporal 60 18 98 % -- --   09/10/23 1906 -- -- -- 60 -- -- -- --   09/10/23 1617 -- -- -- 60 -- -- -- --   09/10/23 1606 (!) 155/68 37.9 °C (100.2 °F) Oral 60 18 96 % 1.829 m (6') (!) 137 kg (302 lb 4.8 oz)   09/10/23 1513 133/67 36.6 °C (97.8 °F) Temporal 60 18 95 % -- --   09/10/23 1405 (!) 110/55 37.4 °C (99.3 °F) Oral (!) 58 18 96 % -- --   09/10/23 1318 (!) 114/57 -- -- 61 16 98 % -- --   09/10/23 1209 (!) 111/58 -- -- 60 18 96 % -- --   09/10/23 1100 (!) 112/56 37.3 °C (99.2 °F) Temporal 67 16 94 % 1.829 m (6') (!) 142 kg (312 lb)       Temp (72hrs), Av.2 °C (98.9 °F), Min:36.6 °C (97.8 °F), Max:37.9 °C (100.2 °F)      Physical Exam:    General appearance: alert and cooperative  Head: normocephalic, without obvious abnormality, atraumatic  Eyes: anicteric sclera  Neck: supple  Lungs: clear to auscultation bilaterally  Heart: regular rate and rhythm  Abdomen:  soft, non-tender  Extremities: lymphedema b/l. L foot and R middle finger dressing intact  Joints: no swelling  Skin: chronic venous stasis b/l LE  Neurologic: Grossly normal    Lines, Drains, Airways, Wounds:  Peripheral IV (Adult) 09/10/23 Posterior;Right Hand (Active)   Number of days: 1       Peripheral IV (Adult) 09/10/23 Left;Posterior Hand (Active)   Number of days: 1       Wound Pressure Injury Right;Left Buttock (Active)   Number of days: 1       Wound Venous Ulcer Left Heel (Active)   Number of days: 1       Wound Anterior;Left Groin (Active)   Number of days: 1       Surgical Incision Finger (Middle) Posterior;Right (Active)   Number of days: 1       Labs:    CBC Results       09/11/23 09/10/23 01/31/22     0013 1112 1404    WBC 19.80 17.74 4.1    RBC 3.91 4.37 4.83    HGB 12.0 13.5 14.7    HCT 36.5 41.2 44.2    MCV 93.4 94.3 91.5    MCH 30.7 30.9 30.4    MCHC 32.9 32.8 33.3     157 167      BMP Results       09/11/23 09/10/23 01/31/22     0013 1112 1404     137 134    K 4.6 4.4 4.8    Cl 98 98 97    CO2 29 28 28    Glucose 201 194 387    BUN 31 27 17    Creatinine 2.8 2.5 1.26    Calcium 8.9 9.5 9.6    Anion Gap 10 11 --    EGFR 22.2 25.3 56       65         Comment for K at 1112 on 09/10/23: Results obtained on plasma. Plasma Potassium values may be up to 0.4 mEQ/L less than serum values. The differences may be greater for patients with high platelet or white cell counts.    Comment for Glucose at 1404 on 01/31/22:               Fasting reference interval     For someone without known diabetes, a glucose  value >125 mg/dL indicates that they may have  diabetes and this should be confirmed with a  follow-up test.         Comment for Creatinine at 1404 on 01/31/22: For patients >49 years of age, the reference limit  for Creatinine is approximately 13% higher for people  identified as -American.           PT/PTT Results       04/17/15 03/19/15 02/18/15           PT 31.0 31.3 25.1    INR  2.8 2.9 2.3         Comment for PT on 04/17/15: For more information on this test, go to: http://education.mPATH/faq/IOG554    Test performed at [facility] [address]  [id number] Director: YELITZA CUEVAS MD PHD    Comment for PT on 03/19/15: For more information on this test, go to: http://mokono.mPATH/faq/FCF408    Test performed at [facility] [address]  [id number] Director: YELITZA CUEVAS MD PHD    Comment for PT on 02/18/15: For more information on this test, go to: http://mokono.mPATH/faq/GYQ521    Test performed at [facility] [address]  [id number] Director: YELITZA CUEVAS MD PHD    Comment for INR on 04/17/15: Reference Range                     0.9-1.1 Moderate-intensity Warfarin Therapy 2.0-3.0 Higher-intensity Warfarin Therapy   3.0-4.0    Comment for INR on 03/19/15: Reference Range                     0.9-1.1 Moderate-intensity Warfarin Therapy 2.0-3.0 Higher-intensity Warfarin Therapy   3.0-4.0    Comment for INR on 02/18/15: Reference Range                     0.9-1.1 Moderate-intensity Warfarin Therapy 2.0-3.0 Higher-intensity Warfarin Therapy   3.0-4.0      UA Results    No lab values to display.     Lactate Results    No lab values to display.         Microbiology Results     ** No results found for the last 720 hours. **          Pathology Results     ** No results found for the last 720 hours. **          Echo:     Cardiac Imaging    ECHOCARDIOGRAM STRESS TEST     Narrative  Ordered by an unspecified provider.      Imaging:    Radiology Imaging    XR FINGER 2+ VW RIGHT    Narrative  CLINICAL HISTORY:  Postprocedure    --    Impression  Stable distraction of the fracture fragments at the base of the  third distal phalanx.    COMPARISON: Right hand and finger radiographs 9/10/2023.    COMMENT: AP and lateral views of  the right third digit are completed post  procedure.  The intra-articular fracture at the base of the third distal phalanx is in  stable position compared with earlier exams.  There is some radiopaque material in the right renal bed, and in an overlying  gauze dressing.  No soft tissue gas.  Normal alignment at the third DIP joint on the provided images.      Assessment     1. Leukocytosis - likely due to L foot chronic wound with possible osteomyelitis after recent infection in July and completion of 3 week course of amox-clav after initial recommendation was for IV therapy but pt refused it at that time. Blood cx are in process with pt remaining afebrile    2. L foot chronic wound - s/p recent skin grafting and mgmt per wound care    3. DM - mgmt per Primary team        Plan     1. D/C ceftriaxone and start amp-sulbactam while waiting for blood cx and obtain MRI L foot to assess for osteomyelitis. Maintain ongoing wound care while waiting for leukocytosis to improve.

## 2023-09-11 NOTE — PROGRESS NOTES
Hospital Medicine Service -  Daily Progress Note       SUBJECTIVE   Interval History: Patient seen and examined at bedside.  Vital signs and lab work reviewed. Reports he feels all right. Denies chest pain or SOB. Some oozing from his hand dressing.      OBJECTIVE      Vital signs in last 24 hours:  Temp:  [36.6 °C (97.8 °F)-37.9 °C (100.2 °F)] 36.9 °C (98.4 °F)  Heart Rate:  [58-61] 61  Resp:  [18] 18  BP: (110-155)/(55-68) 116/55    Intake/Output Summary (Last 24 hours) at 9/11/2023 1325  Last data filed at 9/11/2023 0000  Gross per 24 hour   Intake 753 ml   Output 300 ml   Net 453 ml       PHYSICAL EXAMINATION      Physical Exam    General exam : appears age stated,  frail appearing, obese   Head: atraumatic, normocephalic  Eyes : PERRLA, EOMI, no pallor, no icterus  ENT: no lesions, oropharynx pink, mucous membranes moist   Neck: supple, no Lymph nodes, no Thyromegaly, no JVD   CVS : Paced rhythm  Resp:normal accessory muscle usage, clear to auscultation Bilaterally  Abdomen : soft, Nt, BS +, no organomegaly   Extremities : +3 edema, no cyanosis  MSK: Right hand injury noted. Dressing insitu  Skin: intact, warm, no rash  Neuro: AAO x3, CN 2-12 intact,  motor strength in all extremities, global weakness.  Psych: normal mood.cooperative     LINES, CATHETERS, DRAINS, AIRWAYS, AND WOUNDS   Lines, Drains, and Airways:  Wounds (agree with documentation and present on admission):  Peripheral IV (Adult) 09/10/23 Posterior;Right Hand (Active)   Number of days: 1       Peripheral IV (Adult) 09/10/23 Left;Posterior Hand (Active)   Number of days: 1       Wound Pressure Injury Right;Left Buttock (Active)   Number of days: 1       Wound Venous Ulcer Left Heel (Active)   Number of days: 1       Wound Anterior;Left Groin (Active)   Number of days: 1       Surgical Incision Finger (Middle) Posterior;Right (Active)   Number of days: 1         Comments:      LABS / IMAGING / TELE      Labs  Lab Results   Component Value Date     WBC 19.80 (H) 09/11/2023    HGB 12.0 (L) 09/11/2023    HCT 36.5 (L) 09/11/2023    MCV 93.4 09/11/2023     (L) 09/11/2023     Lab Results   Component Value Date    GLUCOSE 201 (H) 09/11/2023    CALCIUM 8.9 09/11/2023     09/11/2023    K 4.6 09/11/2023    CO2 29 09/11/2023    CL 98 09/11/2023    BUN 31 (H) 09/11/2023    CREATININE 2.8 (H) 09/11/2023         Imaging  X-RAY FINGER RIGHT 2+ VIEWS [[id number]] Collected: 09/11/23 1008   Order Status: Completed Updated: 09/11/23 1040   Narrative:     CLINICAL HISTORY:  Postprocedure    Impression:     IMPRESSION:  Stable distraction of the fracture fragments at the base of the   third distal phalanx.     COMPARISON: Right hand and finger radiographs 9/10/2023.     COMMENT: AP and lateral views of the right third digit are completed post   procedure.   The intra-articular fracture at the base of the third distal phalanx is in   stable position compared with earlier exams.   There is some radiopaque material in the right renal bed, and in an overlying   gauze dressing.   No soft tissue gas.   Normal alignment at the third DIP joint on the provided images.   X-RAY FINGER RIGHT 2+ VIEWS [[id number]] Collected: 09/11/23 0746   Order Status: Completed Updated: 09/11/23 0751   Narrative:     CLINICAL HISTORY:  2nd post reduction    Impression:     IMPRESSION:  A displaced, closed, intra-articular fracture of the base of the   distal phalanx is confirmed. The middle phalanx appears intact on the provided   images.     COMPARISON: Right hand and wrist radiographs 9/10/2023 obtained at 1145, 1340,   1900 hours.     COMMENT:  3 images of the right third digit   Third finger splint removed in the interval.   There is an intra-articular fracture the base of the third distal phalanx.   The middle phalanx appears intact on the provided images.   Normal alignment maintained at the DIP joint.   Mild associated soft tissue swelling noted.   X-RAY HAND RIGHT 2 VIEWS [[id number]]  Collected: 09/10/23 2102   Order Status: Completed Updated: 09/10/23 2113   Narrative:     CLINICAL HISTORY:  Status post splinting right hand    Impression:     IMPRESSION:  Improved alignment of the distal third metacarpal fracture   fragments post splinting.   Persistent distraction of the fracture fragments at the base of the third distal   phalanx noted.     COMPARISON: Right hand radiographs 9/10/2023 11:47 AM.     COMMENT:  Three views of the right hand are submitted.     Interval splint placement third digit, with improved alignment of fracture   fragments of the distal aspect of the third middle phalanx.  There is also   fracture involving the base of the third distal phalanx which remains distracted   approximately 2 mm.  Overlying soft tissue swelling again noted.     Additional observations include generalized osseous demineralization,   osteoarthritic changes in the first CMC and triscaphe joints, mild scapholunate   dissociation, chondrocalcinosis at the wrist.   X-RAY SHOULDER RIGHT 2+ VIEWS [[id number]] Collected: 09/10/23 1412   Order Status: Completed Updated: 09/10/23 1415   Narrative:     CLINICAL HISTORY: fall    Impression:     IMPRESSION:   Severe osteoarthritic changes without evidence of acute fracture malalignment as   discussed below.     COMMENT:     Comparison: None.     3 views of the right shoulder. Large subacromial spur. Severe glenohumeral   osteoarthritis. AC joint degenerative disease. Enthesophyte formation and   calcification at the rotator cuff insertion. No glenohumeral dislocation.   Intra-articular body formation in the axillary recess of the glenohumeral joint.   No evidence of acute fracture or malalignment.   Limited assessment of the right hemithorax appear grossly within normal limits.          X-RAY WRIST RIGHT 3+ VIEWS [[id number]] Collected: 09/10/23 1410   Order Status: Completed Updated: 09/10/23 1414   Narrative:     CLINICAL HISTORY: fall    Impression:      IMPRESSION:   Demineralization, degenerative disease and other chronic findings as discussed   below with no evidence of acute fracture malalignment at the right wrist.     COMMENT:     4 views of the right wrist are performed and compared with 9/10/2023 x-rays of   the hands and 4/21/2014.   Demineralization limits assessment. Extensive atherosclerotic vascular   calcifications. Chondrocalcinosis of the TFCC. Diffuse carpal osteoarthritis   greatest at the base of the thumb. Chronic mild widening of the scapholunate   interval is again noted. This could be seen with scapholunate ligament tears.   This is also similar to older x-rays from 4/21/2014. Intra-articular bodies in   the proximal carpus suspected. No evidence of acute fracture. Peripheral IV is   noted in the right hand.   X-RAY HAND RIGHT 3+ VIEWS [[id number]] Collected: 09/10/23 1208   Order Status: Completed Updated: 09/10/23 1212   Narrative:     CLINICAL HISTORY: right 3rd finger injury r/o open fx or dislocation    Impression:     IMPRESSION:   Acute fracture along the dorsum of the left third finger at the level of the   distal interphalangeal joint. Other, it is uncertain if this fracture fragment   is emanating from the dorsal base of the distal phalanx or the distal portions   of the dorsal middle phalanx or if the fracture involves both phalanges. There   is evidence of overlying soft tissue injury/laceration. No evidence of   radiopaque foreign bodies.   Diffuse degenerative joint disease typical of osteoarthritis. Degenerative   disease has progressed since 2014.   Demineralization may limit assessment. Slight widening of scapholunate interval   could be related to chronic scapholunate ligament tearing.   No evidence of additional fracture or malalignment. Atherosclerotic vascular   calcifications.       COMMENT:     4 views of the right hand are performed and compared with 4/21/2014. See   impression.   X-RAY CHEST 1 VIEW [[id number]]  Collected: 09/10/23 1206   Order Status: Completed Updated: 09/10/23 1209   Narrative:     CLINICAL HISTORY: Fell out of bed. Rule out pneumonia.    Impression:     IMPRESSION:   Cannot exclude mild pulmonary vascular congestion.   Otherwise limited study reveals no definite acute disease.     COMMENT:     Comparison: Chest x-ray dated 5/22/2013.       AP erect view of the chest.   Body habitus and diminished lung volumes limit assessment significantly.     No evidence of a pneumothorax or sizable pleural effusion. No overt pulmonary   edema although vascular congestion cannot be excluded. Otherwise lungs appear   grossly clear within the limitations of the study. Left pacer is noted. Cardiac   silhouette appears somewhat enlarged. Atherosclerotic aorta. No gross bone or   upper abdominal findings of concern. Severe shoulder osteoarthritis bilaterally.   CT CERVICAL SPINE WITHOUT IV CONTRAST [[id number]] Collected: 09/10/23 1157   Order Status: Completed Updated: 09/10/23 1206   Narrative:     CLINICAL HISTORY: fall hitting head on thinners    Impression:     IMPRESSION:   No evidence of acute fracture or posttraumatic subluxation.   Image quality is mildly degraded by artifact mildly limiting assessment.   Advanced degenerative disease possibly with severe central spinal stenosis at   C6-C7 due to a disc osteophyte complex.     COMMENT:     Comparison: None.     Technique: Noncontrast CT of the C-spine   CT DOSE:  The kV and/or mA were adjusted according to the patient's size and   body part for CT dose reduction.     Findings:   Straightened cervical lordosis. No prevertebral soft tissue swelling. Please   note that assessment is slightly limited due to streak artifact from body   habitus.   Craniocervical junction is normally aligned. No widening atlantodental interval.   Ligamentous calcifications about the dens are nonspecific although can be seen   with CPPD. Small chronic appearing erosions are also noted in  the base of the   dens more so on the right.   No subluxation. No evidence of fracture within the confines the study. Advanced   degenerative disease. There may be severe central spinal stenosis at C6-7 due to   disc osteophyte complex. Otherwise varying degrees of central canal and neural   foraminal stenosis elsewhere. Limited assessment of central canal with no   obvious epidural fluid collection.   No gross findings of concern in the upper lung fields.   There are atherosclerotic changes. Left pacer leads partially imaged.   Limited assessment of the unenhanced soft tissues neck reveals no gross findings   of concern.    CT HEAD WITHOUT IV CONTRAST [[id number]] Collected: 09/10/23 1152   Order Status: Completed Updated: 09/10/23 1158   Narrative:     CLINICAL HISTORY: Head trauma, moderate-severe   fall on thinners    Impression:     IMPRESSION:   No evidence of acute intracranial hemorrhage, mass effect or large acute   territorial infarction by CT criteria..     COMMENT:     Comparison: None     TECHNIQUE: CT of the brain was performed without intravenous contrast.   CT DOSE:  One or more dose reduction techniques (e.g. automated exposure   control, adjustment of the mA and/or kV according to patient size, use of   iterative reconstruction technique) utilized for this examination.     FINDINGS:   Ventricles, sulci and basal cisterns: Prominent compatible with cerebral volume   loss.   Parenchyma: Patchy nonspecific moderate white matter disease probably   microangiopathic. No evidence of acute intracranial hemorrhage. No mass effect   or cerebral edema. No CT evidence of large acute territorial infarction.   Extra-axial spaces: No extra-axial fluid collection.   Imaged paranasal sinuses and mastoids: Clear.   Calvarium: No depressed calvarial fracture.   Extra calvarial structures:Atherosclerotic vascular calcifications. Mild soft   tissue swelling may be present in the anterior scalp.   On the  images,  there is severe right greater than left osteoarthritis of   the shoulders all left pacer visible.         ECG/Telemetry  Reviewed    ASSESSMENT AND PLAN      * DAXA (acute kidney injury) (CMS/[facility])  Assessment & Plan  Baseline creatinine 1.36.   DAXA on CKD stage III   Today creatinine noted 2.8 (Bumex, lisinopril and farxiga on hold)  Likely secondary to diuretic use.  We will hold diuretics.  CK-112  Gentle hydration  Avoid nephrotoxins.  USG kidneys, PVR  Nephrology consult.    Fracture of right hand  Assessment & Plan  Acute fracture along the dorsum of the Right third finger at the level of the   distal interphalangeal joint.    Ortho consult appreciated.      Diabetic leg ulcer (CMS/[facility])  Assessment & Plan  Leukocytosis, 19 K , no fever. UA negative,  Chest x-ray no obvious pneumonia.  Continue to monitor vital signs closely. Monitor off antibiotics  Could be related to infected left leg wound  Podiatry consulted  Blood culture pending  ID consult appreciated- he has been started on Unasyn and needs MRI left leg    Fall  Assessment & Plan  73-year-old male with past medical history of ambulatory dysfunction, morbid obesity, CHF, hypertension, diabetes presents emergency room after sustaining a fall this morning.  Fall appears to be mechanical.  CT head/cervical spine is negative.  Right hand middle finger fracture noted.    Elevated troponin I level  Assessment & Plan  Elevated troponin level noted 116.  Paced rhythm.  Patient denies chest pain.  Aspirin 324x1 dose given.  Continue to trend cardiac enzymes.  Cardiology consult.    Chronic systolic congestive heart failure (CMS/[facility])  Assessment & Plan  History of CHF.  We will hold Bumex due to worsening renal function.  Component of mild CHF clinically noted with bilateral leg swelling.  However patient denies any cardiac symptoms.  We will follow cardiology recommendations.    History of pulmonary embolism  Assessment & Plan  History of DVT and PE in the past.   On Xarelto.    Persistent atrial fibrillation (CMS/HCC)  Assessment & Plan  Paced rhythm.  Continue Xarelto.  Telemetry monitoring.    Sleep apnea  Assessment & Plan  CPAP at nighttime.    Morbid obesity (CMS/HCC)  Assessment & Plan  Advised on diet and lifestyle modifications    Diabetes mellitus (CMS/HCC)  Assessment & Plan  On long term insulin  Continue home dose  farxiga on hold given DAXA on CKD    Benign essential hypertension  Assessment & Plan  Blood pressure is stable.  At this point will hold ACE inhibitors and Bumex due to worsening renal function.  We will follow BMP closely.    Mixed hyperlipidemia  Assessment & Plan  Stable.  Continue statins.       VTE Assessment: Pad[place]    VTE Prophylaxis:  Current anticoagulants:  rivaroxaban (XARELTO) tablet 20 mg, oral, Daily with dinner      Code Status: Full Code      Estimated Discharge Date: 9/12/2023     Disposition Planning: pending progress, consults, monitor Cr & WBC, watch for fever     Yolie Box MD  9/11/2023

## 2023-09-11 NOTE — CONSULTS
Podiatric Surgery Consult Note    Subjective      Mitchell Sauceda is a 73 y.o. male who was admitted for Open nondisplaced fracture of distal phalanx of right middle finger, initial encounter    Fall, initial encounter    Cardiac pacemaker    Chronic systolic congestive heart failure (CMS/[facility]). Patient was consulted for wound at the left foot lateral heel. Patient states that the wound has been present for the last 7-8 years.  He says that he used to go to wound care at [facility] but the wound never resolved.  Patient then started seeing Dr. De Souza at [facility].  He states that in the last 7 weeks the wound has gotten considerably smaller.  He is being treated with Santyl DSD weekly and has wound care nursing coming to his home 2 days a week.  He has no other pedal complaints.  Pt denies any N/V/F/C/CP/SOB      Review of Systems:   Constitutional:  Negative for fevers, chills   Cardiovascular: Negative for chest pain, SOB   Gastrointestinal: Negative for nausea and vomiting.   Musculoskeletal: Negative for tenderness in any juan pablo prominences, joints, tendons, myalgias  Skin:  Wound at the lateral heel of left foot      Medical History:   Past Medical History:   Diagnosis Date    Arthritis     GERD (gastroesophageal reflux disease)     Hypertension     Lipid disorder     Neuropathy     Persistent atrial fibrillation (CMS/[facility]) 03/19/2019    Primary osteoarthritis involving multiple joints 05/15/2019    Pulmonary embolism (CMS/[facility])     Type 2 diabetes mellitus (CMS/[facility])     Type 2 diabetes mellitus with hypoglycemia (CMS/[facility]) 10/24/2013    Overview:     Wound healing, delayed        Surgical History:   Past Surgical History:   Procedure Laterality Date    KNEE ARTHROSCOPY Bilateral     VEIN SURGERY Right        Social History:   Social History     Socioeconomic History    Marital status:    Tobacco Use    Smoking status: Never    Smokeless tobacco: Never   Substance and  Sexual Activity    Alcohol use: No     Social Determinants of Health     Financial Resource Strain: Low Risk  (9/11/2023)    Overall Financial Resource Strain (CARDIA)     Difficulty of Paying Living Expenses: Not hard at all   Food Insecurity: No Food Insecurity (9/10/2023)    Hunger Vital Sign     Worried About Running Out of Food in the Last Year: Never true     Ran Out of Food in the Last Year: Never true   Transportation Needs: No Transportation Needs (9/11/2023)    PRAPARE - Transportation     Lack of Transportation (Medical): No     Lack of Transportation (Non-Medical): No   Housing Stability: Unknown (9/11/2023)    Housing Stability Vital Sign     Unable to Pay for Housing in the Last Year: No     Unstable Housing in the Last Year: No       Family History:   Family History   Problem Relation Age of Onset    Factor V Leiden deficiency Biological Sister     Factor V Leiden deficiency Nephew        Allergies:   Allergies   Allergen Reactions    Vancomycin     Prednisone Hives     Other reaction(s): rash/hives       Home Medications:    ascorbic acid, Take 1 tablet by mouth daily.    atorvastatin, Take 1 tablet (20 mg total) by mouth daily.    bumetanide, Take 1 mg by mouth 3 (three) times a day.    cyanocobalamin, Take 1,000 tablets by mouth daily.    dapagliflozin, Take 10 mg by mouth daily.    FREESTYLE BURT 2 READER, Check BG as directed. Change every 2 weeks. DX: Z79.4, E 13.40    FREESTYLE BURT 2 SENSOR, Check BG as directed DX: Z79.4, E 13.40    gabapentin, TAKE 1 CAPSULE BY MOUTH TWICE A DAY    glimepiride, Take 6 mg by mouth daily with breakfast. (1.5 Tablets PO QD)    insulin lispro protamin/lispro (HUMALOG MIX 75-25 KWIKPEN SUBQ), Inject 25 Units under the skin 2 (two) times a day.    KLOR-CON M20, TAKE 1 TABLET BY MOUTH 2 TIMES A DAY.    lancets, for blood glucose monitoring 3x day    lisinopriL, Take 1 tablet (2.5 mg total) by mouth daily.    pantoprazole, Take 1 tablet  (40 mg total) by mouth daily.    PEN NEEDLE, DIABETIC (BD ULTRA-FINE MICRO PEN NEEDLE MISC), once daily use with Levemir    rivaroxaban, Take 1 tablet (20 mg total) by mouth daily with dinner.    semaglutide, Inject 1 mg under the skin every (seven) 7 days.    Current Medications:    acetaminophen, 650 mg, oral, q6h PRN    ampicillin-sulbactam, 3 g, intravenous, q12h INT    atorvastatin, 20 mg, oral, Daily    cetirizine, 5 mg, oral, Daily    glucose, 16-32 g of dextrose, oral, PRN **OR** dextrose, 15-30 g of dextrose, oral, PRN **OR** glucagon, 1 mg, intramuscular, PRN **OR** dextrose 50 % in water (D50), 25 mL, intravenous, PRN    ferrous sulfate, 325 mg, oral, Daily with breakfast    gabapentin, 300 mg, oral, BID    glimepiride, 4 mg, oral, Daily with breakfast    insulin lispro protamine-lispro (75/25), 25 Units, subcutaneous, BID AC    insulin lispro U-100, 3-5 Units, subcutaneous, TID with meals    nitroglycerin, 0.4 mg, sublingual, q5 min PRN    oxyCODONE, 5 mg, oral, q6h PRN    pantoprazole, 40 mg, oral, Daily    rivaroxaban, 20 mg, oral, Daily with dinner    sodium chloride 0.9 %, , intravenous, Continuous      Last documented Vital Signs:  Vitals    09/11/23 0600 09/11/23 0728 09/11/23 1137 09/11/23 1449   BP: 125/67 (!) 138/56 (!) 116/55 (!) 116/55   Pulse: 61 60 61 61   Resp: 18   18   Temp: 36.9 °C (98.5 °F) 36.8 °C (98.2 °F) 36.9 °C (98.4 °F) 37 °C (98.6 °F)   SpO2: 96% 95% 95% 96%       Labs:  CBC Results       09/11/23 09/10/23 01/31/22     0013 1112 1404    WBC 19.80 17.74 4.1    RBC 3.91 4.37 4.83    HGB 12.0 13.5 14.7    HCT 36.5 41.2 44.2    MCV 93.4 94.3 91.5    MCH 30.7 30.9 30.4    MCHC 32.9 32.8 33.3     157 167        Microbiology Results     ** No results found for the last 720 hours. **            Imaging:  I have reviewed the imaging from the last 24 hours    Objective       -Vascular: DP/PT pulses are nonpalpable. B/L edema to the foot, ankle and leg. Capillary  refill time is less than 3 seconds B/L.  Skin temperature is warm to warm from leg to toes B/L.    -Ortho: + active df/pf of ankle. MMT 5/5 in all planes tested. No pain noted in the posterior calf upon palpation.   -Neuro:Light touch and protective sensation is diminished  -Derm: There are no open lesions B/L.  Moderate BLE edema   RLE: There are no open wounds or lesions.   LLE: Wound at the lateral heel.  Wound base is granular.  Wound edges are intact.  There is periwound erythema and maceration noted.  There is mild sanguinous drainage noted.  There is no lymphangitic streaking noted.  There is no malodor noted.  Wound does not probe to bone.  There is no clinical signs of infection noted.      ASSESSMENT/PLAN:  73 y.o. male being consulted for left foot lateral heel wound without concern for infection    -Patient was seen and examined with all questions and concerns addressed.  -Patient was dressed with adaptic, DSD, ABD, and Viki. Dressings will be changed while in house freq: DANIELITO  -Santyl ordered  -Patient may weight bear to B/L LE.   -Rest of care per primary team  --DVT prophylaxsis and pain control per primary team.  -Patient is to follow-up with Dr. DeS ouza at [facility] upon discharge.  -Please contact on call podiatry resident with any questions or concerns.       Flory Robins DPM PGY-2  Pager #[phone]

## 2023-09-12 ENCOUNTER — APPOINTMENT (INPATIENT)
Dept: RADIOLOGY | Facility: HOSPITAL | Age: 73
DRG: 683 | End: 2023-09-12
Payer: MEDICARE

## 2023-09-12 ENCOUNTER — APPOINTMENT (INPATIENT)
Dept: CARDIOLOGY | Facility: HOSPITAL | Age: 73
DRG: 683 | End: 2023-09-12
Attending: INTERNAL MEDICINE
Payer: MEDICARE

## 2023-09-12 PROBLEM — L89.301 PRESSURE INJURY OF BUTTOCK, STAGE 1: Status: ACTIVE | Noted: 2023-09-12

## 2023-09-12 LAB
ANION GAP SERPL CALC-SCNC: 9 MEQ/L (ref 3–15)
AORTIC ROOT ANNULUS: 3.3 CM
AORTIC VALVE MEAN VELOCITY: 0.9 M/S
AORTIC VALVE VELOCITY TIME INTEGRAL: 29.2 CM
ASCENDING AORTA: 3.4 CM
AV MEAN GRADIENT: 4 MMHG
AV PEAK GRADIENT: 6 MMHG
AV PEAK VELOCITY-S: 1.2 M/S
AV VALVE AREA INDEX: 0.9
AV VALVE AREA: 2.16 CM2
AV VELOCITY RATIO: 0.53
AVA (VTI): 2.39 CM2
BASOPHILS # BLD: 0.01 K/UL (ref 0.01–0.1)
BASOPHILS NFR BLD: 0.1 %
BSA FOR ECHO PROCEDURE: 2.64 M2
BUN SERPL-MCNC: 40 MG/DL (ref 7–25)
CALCIUM SERPL-MCNC: 8.4 MG/DL (ref 8.6–10.3)
CHLORIDE SERPL-SCNC: 100 MEQ/L (ref 98–107)
CO2 SERPL-SCNC: 28 MEQ/L (ref 21–31)
CREAT SERPL-MCNC: 2.5 MG/DL (ref 0.7–1.3)
DIFFERENTIAL METHOD BLD: ABNORMAL
DOP CALC LVOT STROKE VOLUME: 69.63 CM3
EOSINOPHIL # BLD: 0.21 K/UL (ref 0.04–0.54)
EOSINOPHIL NFR BLD: 2.8 %
ERYTHROCYTE [DISTWIDTH] IN BLOOD BY AUTOMATED COUNT: 13.9 % (ref 11.6–14.4)
ERYTHROCYTE [SEDIMENTATION RATE] IN BLOOD BY WESTERGREN METHOD: >119 MM/HR
EST RIGHT VENT SYSTOLIC PRESSURE BY TRICUSPID REGURGITATION JET: 27 MMHG
FRACTIONAL SHORTENING: 29.78 %
GFR SERPL CREATININE-BSD FRML MDRD: 25.3 ML/MIN/1.73M*2
GLUCOSE BLD-MCNC: 129 MG/DL (ref 70–99)
GLUCOSE BLD-MCNC: 138 MG/DL (ref 70–99)
GLUCOSE BLD-MCNC: 143 MG/DL (ref 70–99)
GLUCOSE BLD-MCNC: 85 MG/DL (ref 70–99)
GLUCOSE SERPL-MCNC: 123 MG/DL (ref 70–99)
HCT VFR BLDCO AUTO: 35.7 % (ref 40.1–51)
HGB BLD-MCNC: 11.7 G/DL (ref 13.7–17.5)
IMM GRANULOCYTES # BLD AUTO: 0.02 K/UL (ref 0–0.08)
IMM GRANULOCYTES NFR BLD AUTO: 0.3 %
INTERVENTRICULAR SEPTUM: 1.16 CM
LA ESV (BP): 85.1 CM3
LA ESV INDEX (A2C): 30.08 CM3/M2
LA ESV INDEX (BP): 32.23 CM3/M2
LA/AORTA RATIO: 1.45
LAAS-AP2: 24.8 CM2
LAAS-AP4: 26.4 CM2
LAD 2D: 4.8 CM
LALD A4C: 6.17 CM
LALD A4C: 6.8 CM
LAV-S: 79.4 CM3
LEFT ATRIUM VOLUME INDEX: 31.55 CM3/M2
LEFT ATRIUM VOLUME: 83.3 CM3
LEFT INTERNAL DIMENSION IN SYSTOLE: 3.75 CM (ref 7.44–11.29)
LEFT VENTRICULAR INTERNAL DIMENSION IN DIASTOLE: 5.34 CM (ref 13.16–18.29)
LEFT VENTRICULAR POSTERIOR WALL IN END DIASTOLE: 1.19 CM (ref 1.3–2.44)
LVOT 2D: 2.4 CM
LVOT A: 4.52 CM2
LVOT MG: 1 MMHG
LVOT MV: 0.53 M/S
LVOT PEAK VELOCITY: 0.73 M/S
LVOT PG: 2 MMHG
LVOT STROKE VOLUME INDEX: 26.38 ML/M2
LVOT VTI: 15.4 CM
LYMPHOCYTES # BLD: 0.45 K/UL (ref 1.2–3.5)
LYMPHOCYTES NFR BLD: 6.1 %
MCH RBC QN AUTO: 30.7 PG (ref 28–33.2)
MCHC RBC AUTO-ENTMCNC: 32.8 G/DL (ref 32.2–36.5)
MCV RBC AUTO: 93.7 FL (ref 83–98)
MLH CV ECHO AVA INDEX VELOCITY RATIO: 0.8
MONOCYTES # BLD: 0.54 K/UL (ref 0.3–1)
MONOCYTES NFR BLD: 7.3 %
MV E'TISSUE VEL-LAT: 0.07 M/S
MV E'TISSUE VEL-MED: 0.05 M/S
NEUTROPHILS # BLD: 6.16 K/UL (ref 1.7–7)
NEUTS SEG NFR BLD: 83.4 %
NRBC BLD-RTO: 0 %
PDW BLD AUTO: 10.5 FL (ref 9.4–12.4)
PLATELET # BLD AUTO: 119 K/UL (ref 150–350)
POCT TEST: ABNORMAL
POCT TEST: NORMAL
POSTERIOR WALL: 1.19 CM
POTASSIUM SERPL-SCNC: 3.8 MEQ/L (ref 3.5–5.1)
RAP: 3 MMHG
RBC # BLD AUTO: 3.81 M/UL (ref 4.5–5.8)
SEPTAL TISSUE DOPPLER FREE WALL LATE DIA VELOCITY (APICAL 4 CHAMBER VIEW): 0.11 M/S
SODIUM SERPL-SCNC: 137 MEQ/L (ref 136–145)
TAPSE: 1.75 CM
TR MAX PG: 23.81 MMHG
TRICUSPID VALVE PEAK REGURGITATION VELOCITY: 2.44 M/S
WBC # BLD AUTO: 7.39 K/UL (ref 3.8–10.5)
Z-SCORE OF LEFT VENTRICULAR DIMENSION IN END DIASTOLE: -10.66
Z-SCORE OF LEFT VENTRICULAR DIMENSION IN END SYSTOLE: -7.06
Z-SCORE OF LEFT VENTRICULAR POSTERIOR WALL IN END DIASTOLE: -2.09

## 2023-09-12 PROCEDURE — 4B02XSZ MEASUREMENT OF CARDIAC PACEMAKER, EXTERNAL APPROACH: ICD-10-PCS | Performed by: INTERNAL MEDICINE

## 2023-09-12 PROCEDURE — 63600000 HC DRUGS/DETAIL CODE: Mod: JZ | Performed by: INTERNAL MEDICINE

## 2023-09-12 PROCEDURE — 25500000 HC DRUGS/INCIDENT RAD: Mod: JZ | Performed by: STUDENT IN AN ORGANIZED HEALTH CARE EDUCATION/TRAINING PROGRAM

## 2023-09-12 PROCEDURE — 25800000 HC PHARMACY IV SOLUTIONS: Performed by: INTERNAL MEDICINE

## 2023-09-12 PROCEDURE — 94660 CPAP INITIATION&MGMT: CPT

## 2023-09-12 PROCEDURE — C8929 TTE W OR WO FOL WCON,DOPPLER: HCPCS

## 2023-09-12 PROCEDURE — 99232 SBSQ HOSP IP/OBS MODERATE 35: CPT | Performed by: STUDENT IN AN ORGANIZED HEALTH CARE EDUCATION/TRAINING PROGRAM

## 2023-09-12 PROCEDURE — 85652 RBC SED RATE AUTOMATED: CPT | Performed by: STUDENT IN AN ORGANIZED HEALTH CARE EDUCATION/TRAINING PROGRAM

## 2023-09-12 PROCEDURE — 73721 MRI JNT OF LWR EXTRE W/O DYE: CPT | Mod: LT

## 2023-09-12 PROCEDURE — 36415 COLL VENOUS BLD VENIPUNCTURE: CPT | Performed by: STUDENT IN AN ORGANIZED HEALTH CARE EDUCATION/TRAINING PROGRAM

## 2023-09-12 PROCEDURE — 25000000 HC PHARMACY GENERAL: Performed by: STUDENT IN AN ORGANIZED HEALTH CARE EDUCATION/TRAINING PROGRAM

## 2023-09-12 PROCEDURE — 82310 ASSAY OF CALCIUM: CPT | Performed by: STUDENT IN AN ORGANIZED HEALTH CARE EDUCATION/TRAINING PROGRAM

## 2023-09-12 PROCEDURE — 63700000 HC SELF-ADMINISTRABLE DRUG: Performed by: INTERNAL MEDICINE

## 2023-09-12 PROCEDURE — 93306 TTE W/DOPPLER COMPLETE: CPT | Mod: 26 | Performed by: STUDENT IN AN ORGANIZED HEALTH CARE EDUCATION/TRAINING PROGRAM

## 2023-09-12 PROCEDURE — 20600000 HC ROOM AND CARE INTERMEDIATE/TELEMETRY

## 2023-09-12 PROCEDURE — 85025 COMPLETE CBC W/AUTO DIFF WBC: CPT | Performed by: STUDENT IN AN ORGANIZED HEALTH CARE EDUCATION/TRAINING PROGRAM

## 2023-09-12 RX ADMIN — GLIMEPIRIDE 4 MG: 2 TABLET ORAL at 08:58

## 2023-09-12 RX ADMIN — GABAPENTIN 300 MG: 300 CAPSULE ORAL at 20:21

## 2023-09-12 RX ADMIN — ATORVASTATIN CALCIUM 20 MG: 20 TABLET, FILM COATED ORAL at 08:58

## 2023-09-12 RX ADMIN — SODIUM CHLORIDE: 9 INJECTION INTRAMUSCULAR; INTRAVENOUS; SUBCUTANEOUS at 10:40

## 2023-09-12 RX ADMIN — INSULIN LISPRO 25 UNITS: 100 INJECTION, SUSPENSION SUBCUTANEOUS at 09:00

## 2023-09-12 RX ADMIN — CETIRIZINE HYDROCHLORIDE 5 MG: 5 TABLET ORAL at 08:58

## 2023-09-12 RX ADMIN — AMPICILLIN AND SULBACTAM 3 G: 2; 1 INJECTION, POWDER, FOR SOLUTION INTRAMUSCULAR; INTRAVENOUS at 11:49

## 2023-09-12 RX ADMIN — ACETAMINOPHEN 650 MG: 325 TABLET ORAL at 16:19

## 2023-09-12 RX ADMIN — RIVAROXABAN 20 MG: 20 TABLET, FILM COATED ORAL at 16:19

## 2023-09-12 RX ADMIN — GABAPENTIN 300 MG: 300 CAPSULE ORAL at 08:59

## 2023-09-12 RX ADMIN — FERROUS SULFATE TAB 325 MG (65 MG ELEMENTAL FE) 325 MG: 325 (65 FE) TAB at 08:58

## 2023-09-12 RX ADMIN — PANTOPRAZOLE SODIUM 40 MG: 40 TABLET, DELAYED RELEASE ORAL at 08:58

## 2023-09-12 RX ADMIN — INSULIN LISPRO 25 UNITS: 100 INJECTION, SUSPENSION SUBCUTANEOUS at 17:39

## 2023-09-12 ASSESSMENT — COGNITIVE AND FUNCTIONAL STATUS - GENERAL
STANDING UP FROM CHAIR USING ARMS: 3 - A LITTLE
CLIMB 3 TO 5 STEPS WITH RAILING: 2 - A LOT
STANDING UP FROM CHAIR USING ARMS: 3 - A LITTLE
CLIMB 3 TO 5 STEPS WITH RAILING: 2 - A LOT
MOVING TO AND FROM BED TO CHAIR: 3 - A LITTLE
WALKING IN HOSPITAL ROOM: 3 - A LITTLE
MOVING TO AND FROM BED TO CHAIR: 3 - A LITTLE
WALKING IN HOSPITAL ROOM: 3 - A LITTLE

## 2023-09-12 ASSESSMENT — ENCOUNTER SYMPTOMS
ACTIVITY CHANGE: 0
ARTHRALGIAS: 1

## 2023-09-12 NOTE — PROGRESS NOTES
Hospital Medicine Service -  Daily Progress Note       SUBJECTIVE   Interval History: Patient seen and examined at bedside.  Vital signs and lab work reviewed. Reports he feels all right. Anxious to be discharged. Agreed to complete the test today. Worried about his wife and missing his dog.   OBJECTIVE      Vital signs in last 24 hours:  Temp:  [36.2 °C (97.1 °F)-36.9 °C (98.4 °F)] 36.7 °C (98 °F)  Heart Rate:  [59-77] 62  Resp:  [18-20] 20  BP: (116-136)/(63-80) 125/80  No intake or output data in the 24 hours ending 09/12/23 2214    PHYSICAL EXAMINATION      Physical Exam    General exam : appears age stated,  frail appearing, obese   Head: atraumatic, normocephalic  Eyes : PERRLA, EOMI, no pallor, no icterus  ENT: no lesions, oropharynx pink, mucous membranes moist   Neck: supple, no Lymph nodes, no Thyromegaly, no JVD   CVS : Paced rhythm  Resp:normal accessory muscle usage, clear to auscultation Bilaterally  Abdomen : soft, Nt, BS +, no organomegaly   Extremities : +3 edema, no cyanosis  MSK: Right hand injury noted. Dressing insitu  Skin: intact, warm, no rash  Neuro: AAO x3, CN 2-12 intact,  motor strength in all extremities, global weakness.  Psych: normal mood.cooperative     LINES, CATHETERS, DRAINS, AIRWAYS, AND WOUNDS   Lines, Drains, and Airways:  Wounds (agree with documentation and present on admission):  Peripheral IV (Adult) 09/10/23 Posterior;Right Hand (Active)   Number of days: 1       Peripheral IV (Adult) 09/10/23 Left;Posterior Hand (Active)   Number of days: 1       Wound Pressure Injury Right;Left Buttock (Active)   Number of days: 1       Wound Venous Ulcer Left Heel (Active)   Number of days: 1       Wound Anterior;Left Groin (Active)   Number of days: 1       Surgical Incision Finger (Middle) Posterior;Right (Active)   Number of days: 1         Comments:      LABS / IMAGING / TELE      Labs  Lab Results   Component Value Date    WBC 7.39 09/12/2023    HGB 11.7 (L) 09/12/2023    HCT  35.7 (L) 09/12/2023    MCV 93.7 09/12/2023     (L) 09/12/2023     Lab Results   Component Value Date    GLUCOSE 123 (H) 09/12/2023    CALCIUM 8.4 (L) 09/12/2023     09/12/2023    K 3.8 09/12/2023    CO2 28 09/12/2023     09/12/2023    BUN 40 (H) 09/12/2023    CREATININE 2.5 (H) 09/12/2023         Imaging  X-RAY FINGER RIGHT 2+ VIEWS [[id number]] Collected: 09/11/23 1008   Order Status: Completed Updated: 09/11/23 1040   Narrative:     CLINICAL HISTORY:  Postprocedure    Impression:     IMPRESSION:  Stable distraction of the fracture fragments at the base of the   third distal phalanx.     COMPARISON: Right hand and finger radiographs 9/10/2023.     COMMENT: AP and lateral views of the right third digit are completed post   procedure.   The intra-articular fracture at the base of the third distal phalanx is in   stable position compared with earlier exams.   There is some radiopaque material in the right renal bed, and in an overlying   gauze dressing.   No soft tissue gas.   Normal alignment at the third DIP joint on the provided images.   X-RAY FINGER RIGHT 2+ VIEWS [[id number]] Collected: 09/11/23 0746   Order Status: Completed Updated: 09/11/23 0751   Narrative:     CLINICAL HISTORY:  2nd post reduction    Impression:     IMPRESSION:  A displaced, closed, intra-articular fracture of the base of the   distal phalanx is confirmed. The middle phalanx appears intact on the provided   images.     COMPARISON: Right hand and wrist radiographs 9/10/2023 obtained at 1145, 1340,   1900 hours.     COMMENT:  3 images of the right third digit   Third finger splint removed in the interval.   There is an intra-articular fracture the base of the third distal phalanx.   The middle phalanx appears intact on the provided images.   Normal alignment maintained at the DIP joint.   Mild associated soft tissue swelling noted.   X-RAY HAND RIGHT 2 VIEWS [[id number]] Collected: 09/10/23 2102   Order Status: Completed  Updated: 09/10/23 2113   Narrative:     CLINICAL HISTORY:  Status post splinting right hand    Impression:     IMPRESSION:  Improved alignment of the distal third metacarpal fracture   fragments post splinting.   Persistent distraction of the fracture fragments at the base of the third distal   phalanx noted.     COMPARISON: Right hand radiographs 9/10/2023 11:47 AM.     COMMENT:  Three views of the right hand are submitted.     Interval splint placement third digit, with improved alignment of fracture   fragments of the distal aspect of the third middle phalanx.  There is also   fracture involving the base of the third distal phalanx which remains distracted   approximately 2 mm.  Overlying soft tissue swelling again noted.     Additional observations include generalized osseous demineralization,   osteoarthritic changes in the first CMC and triscaphe joints, mild scapholunate   dissociation, chondrocalcinosis at the wrist.   X-RAY SHOULDER RIGHT 2+ VIEWS [[id number]] Collected: 09/10/23 1412   Order Status: Completed Updated: 09/10/23 1415   Narrative:     CLINICAL HISTORY: fall    Impression:     IMPRESSION:   Severe osteoarthritic changes without evidence of acute fracture malalignment as   discussed below.     COMMENT:     Comparison: None.     3 views of the right shoulder. Large subacromial spur. Severe glenohumeral   osteoarthritis. AC joint degenerative disease. Enthesophyte formation and   calcification at the rotator cuff insertion. No glenohumeral dislocation.   Intra-articular body formation in the axillary recess of the glenohumeral joint.   No evidence of acute fracture or malalignment.   Limited assessment of the right hemithorax appear grossly within normal limits.          X-RAY WRIST RIGHT 3+ VIEWS [[id number]] Collected: 09/10/23 1410   Order Status: Completed Updated: 09/10/23 1414   Narrative:     CLINICAL HISTORY: fall    Impression:     IMPRESSION:   Demineralization, degenerative disease and  other chronic findings as discussed   below with no evidence of acute fracture malalignment at the right wrist.     COMMENT:     4 views of the right wrist are performed and compared with 9/10/2023 x-rays of   the hands and 4/21/2014.   Demineralization limits assessment. Extensive atherosclerotic vascular   calcifications. Chondrocalcinosis of the TFCC. Diffuse carpal osteoarthritis   greatest at the base of the thumb. Chronic mild widening of the scapholunate   interval is again noted. This could be seen with scapholunate ligament tears.   This is also similar to older x-rays from 4/21/2014. Intra-articular bodies in   the proximal carpus suspected. No evidence of acute fracture. Peripheral IV is   noted in the right hand.   X-RAY HAND RIGHT 3+ VIEWS [[id number]] Collected: 09/10/23 1208   Order Status: Completed Updated: 09/10/23 1212   Narrative:     CLINICAL HISTORY: right 3rd finger injury r/o open fx or dislocation    Impression:     IMPRESSION:   Acute fracture along the dorsum of the left third finger at the level of the   distal interphalangeal joint. Other, it is uncertain if this fracture fragment   is emanating from the dorsal base of the distal phalanx or the distal portions   of the dorsal middle phalanx or if the fracture involves both phalanges. There   is evidence of overlying soft tissue injury/laceration. No evidence of   radiopaque foreign bodies.   Diffuse degenerative joint disease typical of osteoarthritis. Degenerative   disease has progressed since 2014.   Demineralization may limit assessment. Slight widening of scapholunate interval   could be related to chronic scapholunate ligament tearing.   No evidence of additional fracture or malalignment. Atherosclerotic vascular   calcifications.       COMMENT:     4 views of the right hand are performed and compared with 4/21/2014. See   impression.   X-RAY CHEST 1 VIEW [[id number]] Collected: 09/10/23 1206   Order Status: Completed Updated:  09/10/23 1209   Narrative:     CLINICAL HISTORY: Fell out of bed. Rule out pneumonia.    Impression:     IMPRESSION:   Cannot exclude mild pulmonary vascular congestion.   Otherwise limited study reveals no definite acute disease.     COMMENT:     Comparison: Chest x-ray dated 5/22/2013.       AP erect view of the chest.   Body habitus and diminished lung volumes limit assessment significantly.     No evidence of a pneumothorax or sizable pleural effusion. No overt pulmonary   edema although vascular congestion cannot be excluded. Otherwise lungs appear   grossly clear within the limitations of the study. Left pacer is noted. Cardiac   silhouette appears somewhat enlarged. Atherosclerotic aorta. No gross bone or   upper abdominal findings of concern. Severe shoulder osteoarthritis bilaterally.   CT CERVICAL SPINE WITHOUT IV CONTRAST [[id number]] Collected: 09/10/23 1157   Order Status: Completed Updated: 09/10/23 1206   Narrative:     CLINICAL HISTORY: fall hitting head on thinners    Impression:     IMPRESSION:   No evidence of acute fracture or posttraumatic subluxation.   Image quality is mildly degraded by artifact mildly limiting assessment.   Advanced degenerative disease possibly with severe central spinal stenosis at   C6-C7 due to a disc osteophyte complex.     COMMENT:     Comparison: None.     Technique: Noncontrast CT of the C-spine   CT DOSE:  The kV and/or mA were adjusted according to the patient's size and   body part for CT dose reduction.     Findings:   Straightened cervical lordosis. No prevertebral soft tissue swelling. Please   note that assessment is slightly limited due to streak artifact from body   habitus.   Craniocervical junction is normally aligned. No widening atlantodental interval.   Ligamentous calcifications about the dens are nonspecific although can be seen   with CPPD. Small chronic appearing erosions are also noted in the base of the   dens more so on the right.   No  subluxation. No evidence of fracture within the confines the study. Advanced   degenerative disease. There may be severe central spinal stenosis at C6-7 due to   disc osteophyte complex. Otherwise varying degrees of central canal and neural   foraminal stenosis elsewhere. Limited assessment of central canal with no   obvious epidural fluid collection.   No gross findings of concern in the upper lung fields.   There are atherosclerotic changes. Left pacer leads partially imaged.   Limited assessment of the unenhanced soft tissues neck reveals no gross findings   of concern.    CT HEAD WITHOUT IV CONTRAST [[id number]] Collected: 09/10/23 1152   Order Status: Completed Updated: 09/10/23 1158   Narrative:     CLINICAL HISTORY: Head trauma, moderate-severe   fall on thinners    Impression:     IMPRESSION:   No evidence of acute intracranial hemorrhage, mass effect or large acute   territorial infarction by CT criteria..     COMMENT:     Comparison: None     TECHNIQUE: CT of the brain was performed without intravenous contrast.   CT DOSE:  One or more dose reduction techniques (e.g. automated exposure   control, adjustment of the mA and/or kV according to patient size, use of   iterative reconstruction technique) utilized for this examination.     FINDINGS:   Ventricles, sulci and basal cisterns: Prominent compatible with cerebral volume   loss.   Parenchyma: Patchy nonspecific moderate white matter disease probably   microangiopathic. No evidence of acute intracranial hemorrhage. No mass effect   or cerebral edema. No CT evidence of large acute territorial infarction.   Extra-axial spaces: No extra-axial fluid collection.   Imaged paranasal sinuses and mastoids: Clear.   Calvarium: No depressed calvarial fracture.   Extra calvarial structures:Atherosclerotic vascular calcifications. Mild soft   tissue swelling may be present in the anterior scalp.   On the  images, there is severe right greater than left  osteoarthritis of   the shoulders all left pacer visible.         ECG/Telemetry  Reviewed    ASSESSMENT AND PLAN      * DAXA (acute kidney injury) (CMS/[facility])  Assessment & Plan  Baseline creatinine 1.9 in 7/2023  DAXA on CKD stage III   Today creatinine noted 2.5 (Bumex, lisinopril and farxiga on hold)  Likely secondary to diuretic use.  We will hold diuretics.  CK-112  Gentle hydration  Avoid nephrotoxins.  USG kidneys- unremarkable, PVR- no retention  Nephrology consult appreciated    Pressure injury of buttock, stage 1  Assessment & Plan  Unstageable pressure injury to left heel  and pressure injury to right and left buttock Stage 1 established and present on admission  Encourage ambulation, skin care, wound care per protocol  PT/OT    Fracture of right hand  Assessment & Plan  Acute fracture along the dorsum of the Right third finger at the level of the   distal interphalangeal joint.    Ortho consult appreciated.  Clean bandage      Diabetic leg ulcer (CMS/[facility])  Assessment & Plan  Leukocytosis-normalized , no fever. UA negative,  Chest x-ray no obvious pneumonia.  Continue to monitor vital signs closely. Monitor off antibiotics  Could be related to infected left leg wound  Podiatry consult appreciated  Blood culture pending  ID consult appreciated- he has been started on Unasyn and CRP markedly elevated, needs MRI left leg today (pacemaker compatible)    Fall  Assessment & Plan  73-year-old male with past medical history of ambulatory dysfunction, morbid obesity, CHF, hypertension, diabetes presents emergency room after sustaining a fall this morning.  Fall appears to be mechanical.  CT head/cervical spine is negative.  Right hand middle finger fracture noted. Clean bandage. Appreciate ortho  PT/OT    Elevated troponin I level  Assessment & Plan  Elevated troponin level noted 116.  Paced rhythm.  Patient denies chest pain.  Aspirin 324x1 dose given.  Continue to trend cardiac enzymes.  Cardiology  consult.    Chronic systolic congestive heart failure (CMS/[facility])  Assessment & Plan  History of CHF.  We will hold Bumex due to worsening renal function.    Denies SOB.  However patient denies any cardiac symptoms.  We will follow cardiology recommendations.  Echo today    History of pulmonary embolism  Assessment & Plan  History of DVT and PE in the past.  On Xarelto.    Persistent atrial fibrillation (CMS/[facility])  Assessment & Plan  Paced rhythm.  Continue Xarelto.  Telemetry monitoring.    Sleep apnea  Assessment & Plan  CPAP at nighttime.    Morbid obesity (CMS/[facility])  Assessment & Plan  Advised on diet and lifestyle modifications    Diabetes mellitus (CMS/[facility])  Assessment & Plan  On long term insulin  Continue home dose  farxiga on hold given DAXA on CKD    Benign essential hypertension  Assessment & Plan  Blood pressure is stable.  At this point will hold ACE inhibitors and Bumex due to worsening renal function.  We will follow BMP closely.    Mixed hyperlipidemia  Assessment & Plan  Stable.  Continue statins.       VTE Assessment: Padua    VTE Prophylaxis:  Current anticoagulants:  rivaroxaban (XARELTO) tablet 20 mg, oral, Daily with dinner      Code Status: Full Code      Estimated Discharge Date: 9/14/2023     Disposition Planning: pending progress, MRI     Yolie Box MD  9/12/2023

## 2023-09-12 NOTE — PROGRESS NOTES
Infectious Disease Progress Note    Patient Name: Mitchell Sauceda  MR#: [id number]  : 1950  Admission Date: 9/10/2023  Date: 23   Time: 9:44 AM   Author: João Youssef MD    Major Events: none    Antibiotics:    Anti-infectives (From admission, onward)    Start     Dose/Rate Route Frequency Ordered Stop    23 1230  ampicillin-sulbactam (UNASYN) IVPB 3 g/100 mL NSS         3 g  200 mL/hr over 30 Minutes intravenous Every 12 hours interval 23 1120            Subjective     Review of Systems    Remains afebrile and denies any pain. Tolerating PO intake well and remainder of 12 ROS is unchanged     Objective     Vital Signs:    Patient Vitals for the past 72 hrs:   BP Temp Temp src Pulse Resp SpO2 Height Weight   23 0718 116/72 36.7 °C (98.1 °F) Axillary 69 18 97 % -- --   23 0703 -- -- -- 63 -- -- -- --   23 0345 136/72 36.6 °C (97.8 °F) Temporal 62 18 95 % -- --   23 0313 -- -- -- (!) 59 -- -- -- --   23 2315 135/74 36.2 °C (97.1 °F) Temporal 62 18 96 % -- --   23 122/63 36.9 °C (98.4 °F) Temporal 63 18 96 % -- --   23 1913 -- -- -- 64 -- -- -- --   23 1514 -- -- -- 62 -- -- -- --   23 1449 (!) 116/55 37 °C (98.6 °F) Oral 61 18 96 % -- --   23 1137 (!) 116/55 36.9 °C (98.4 °F) Temporal 61 -- 95 % -- --   23 0728 (!) 138/56 36.8 °C (98.2 °F) Temporal 60 -- 95 % -- --   23 0704 -- -- -- 60 -- -- -- --   23 0600 125/67 36.9 °C (98.5 °F) Temporal 61 18 96 % -- --   23 0320 -- -- -- 61 -- -- -- --   09/10/23 2303 (!) 118/57 36.6 °C (97.9 °F) Temporal 60 -- 93 % -- --   09/10/23 2055 (!) 120/59 37.7 °C (99.9 °F) Temporal 60 18 98 % -- --   09/10/23 1906 -- -- -- 60 -- -- -- --   09/10/23 1617 -- -- -- 60 -- -- -- --   09/10/23 1606 (!) 155/68 37.9 °C (100.2 °F) Oral 60 18 96 % 1.829 m (6') (!) 137 kg (302 lb 4.8 oz)   09/10/23 1513 133/67 36.6 °C (97.8 °F) Temporal 60 18 95 % -- --   09/10/23 1405 (!)  110/55 37.4 °C (99.3 °F) Oral (!) 58 18 96 % -- --   09/10/23 1318 (!) 114/57 -- -- 61 16 98 % -- --   09/10/23 1209 (!) 111/58 -- -- 60 18 96 % -- --   09/10/23 1100 (!) 112/56 37.3 °C (99.2 °F) Temporal 67 16 94 % 1.829 m (6') (!) 142 kg (312 lb)       Temp (72hrs), Av.9 °C (98.5 °F), Min:36.2 °C (97.1 °F), Max:37.9 °C (100.2 °F)      Physical Exam:  General appearance: NAD  Head: normocephalic, without obvious abnormality, atraumatic  Eyes: anicteric sclera  Neck: supple  Lungs: clear to auscultation bilaterally  Heart: regular rate and rhythm  Abdomen: soft, non-tender  Extremities: lymphedema b/l. L foot and R middle finger dressing intact  Joints: no swelling  Skin: chronic venous stasis b/l LE  Neurologic: following commands    Lines, Drains, Airways, Wounds:  Peripheral IV (Adult) 09/10/23 Posterior;Right Hand (Active)   Number of days: 2       Peripheral IV (Adult) 09/10/23 Left;Posterior Hand (Active)   Number of days: 2       Wound Pressure Injury Right;Left Buttock (Active)   Number of days: 2       Wound Venous Ulcer Left Heel (Active)   Number of days: 2       Wound Anterior;Left Groin (Active)   Number of days: 2       Surgical Incision Finger (Middle) Posterior;Right (Active)   Number of days: 2       Labs:    CBC Results       09/12/23 09/11/23 09/10/23     0434 0013 1112    WBC 7.39 19.80 17.74    RBC 3.81 3.91 4.37    HGB 11.7 12.0 13.5    HCT 35.7 36.5 41.2    MCV 93.7 93.4 94.3    MCH 30.7 30.7 30.9    MCHC 32.8 32.9 32.8     144 157      BMP Results       09/12/23 09/11/23 09/10/23     0434 0013 1112     137 137    K 3.8 4.6 4.4    Cl 100 98 98    CO2 28 29 28    Glucose 123 201 194    BUN 40 31 27    Creatinine 2.5 2.8 2.5    Calcium 8.4 8.9 9.5    Anion Gap 9 10 11    EGFR 25.3 22.2 25.3         Comment for K at 1112 on 09/10/23: Results obtained on plasma. Plasma Potassium values may be up to 0.4 mEQ/L less than serum values. The differences may be greater for patients with  high platelet or white cell counts.      PT/PTT Results       04/17/15 03/19/15 02/18/15           PT 31.0 31.3 25.1    INR 2.8 2.9 2.3         Comment for PT on 04/17/15: For more information on this test, go to: http://education.[employer]/faq/ADY152    Test performed at [facility] [address]  [id number] Director: YELITZA CUEVAS MD PHD    Comment for PT on 03/19/15: For more information on this test, go to: http://education.[employer]/faq/IFC799    Test performed at [facility] [address]  [id number] Director: YELITZA CUEVAS MD PHD    Comment for PT on 02/18/15: For more information on this test, go to: http://education.[employer]/faq/YZT253    Test performed at [facility] [address]  [id number] Director: YELITZA CUEVAS MD PHD    Comment for INR on 04/17/15: Reference Range                     0.9-1.1 Moderate-intensity Warfarin Therapy 2.0-3.0 Higher-intensity Warfarin Therapy   3.0-4.0    Comment for INR on 03/19/15: Reference Range                     0.9-1.1 Moderate-intensity Warfarin Therapy 2.0-3.0 Higher-intensity Warfarin Therapy   3.0-4.0    Comment for INR on 02/18/15: Reference Range                     0.9-1.1 Moderate-intensity Warfarin Therapy 2.0-3.0 Higher-intensity Warfarin Therapy   3.0-4.0      UA Results       09/11/23     1156    Color Yellow     Yellow    Clarity Clear     Clear    Glucose 250 (MODERATE)     250 (MODERATE)    Bilirubin Negative     Negative    Ketones Trace     Negative    Sp Grav >1.030     >1.030    Blood Negative     Negative    Ph 6.0     6.0    Protein +1     +1    Urobilinogen 0.2     0.2    Nitrite Negative     Negative    Leuk Est Negative     Negative    WBC 0 TO 3     0 TO 3    RBC 0 TO 4     0 TO 4    Bacteria Rare     Rare         Comment for Ketones at 1156 on 09/11/23: Free sulfhydryl drugs such as  Mesna, Capoten, and Acetylcysteine (Mucomyst) may cause false positive ketonuria.    Comment for Blood at 1156 on 09/11/23: The sensitivity of the occult blood test is equivalent to approximately 4 intact RBC/HPF.    Comment for Leuk Est at 1156 on 09/11/23: Results can be falsely negative due to high specific gravity, some antibiotics, glucose >3 g/dl, or WBC other than neutrophils.    Comment for Leuk Est at 1156 on 09/11/23: Results can be falsely negative due to high specific gravity, some antibiotics, glucose >3 g/dl, or WBC other than neutrophils.      Lactate Results    No lab values to display.         Microbiology Results     Procedure Component Value Units Date/Time    Blood Culture Blood, Venous [[id number]]  (Normal) Collected: 09/10/23 1302    Specimen: Blood, Venous Updated: 09/11/23 1801     Culture No growth at 18-24 hours    Blood Culture Blood, Venous [[id number]]  (Normal) Collected: 09/10/23 1302    Specimen: Blood, Venous Updated: 09/11/23 1801     Culture No growth at 18-24 hours          Pathology Results     ** No results found for the last 720 hours. **          Echo:     Cardiac Imaging    ECHOCARDIOGRAM STRESS TEST     Narrative  Ordered by an unspecified provider.      Imaging:    Radiology Imaging    XR FINGER 2+ VW RIGHT    Narrative  CLINICAL HISTORY:  Postprocedure    --    Impression  Stable distraction of the fracture fragments at the base of the  third distal phalanx.    COMPARISON: Right hand and finger radiographs 9/10/2023.    COMMENT: AP and lateral views of the right third digit are completed post  procedure.  The intra-articular fracture at the base of the third distal phalanx is in  stable position compared with earlier exams.  There is some radiopaque material in the right renal bed, and in an overlying  gauze dressing.  No soft tissue gas.  Normal alignment at the third DIP joint on the provided images.      Assessment     1. Leukocytosis - likely due to L foot wound  infection with possible osteomyelitis after recent infection in July and completion of 3 week course of amox-clav. WBC count now down to wnl and blood cx are showing no growth to date     2. L foot chronic wound - s/p recent skin grafting and mgmt per wound care     3. DM - mgmt per Primary team    Plan      1. Continue amp-sulbactam with pt remaining afebrile without leukocytosis while waiting for MRI.

## 2023-09-12 NOTE — PLAN OF CARE
Care Coordination Discharge Plan Note     Discharge Needs Assessment  Concerns to be Addressed: discharge planning  Current Discharge Risk:      Anticipated Discharge Plan  Anticipated Discharge Disposition: skilled nursing facility, home with assistance, home with home health  Type of Home Care Services: home OT, home PT, nursing    Type of Skilled Nursing Care Services: OT, PT, nursing        Patient Choice  Offered/Gave Vendor List: no  Patient's Choice of Community Agency(s):           ---------------------------------------------------------------------------------------------------------------------    Interdisciplinary Discharge Plan Review:  Participants:     Concerns Comments: Patient is r/o osteo and is pending MRI to confirm - IV ab - may need long term abx/PICC - homecare with home infusion?    Discharge Plan:   Disposition/Destination:   /    Discharge Facility:    Community Resources:      Discharge Transportation:  Is Out of Hospital DNR needed at Discharge: no  Does patient need discharge transport?        RNCC patient is scheduled for MRI of left foot and remains on IV abx Unasyn until MRI is resulted.  Creat is also elevated and on IVF's. PT/OT pending.  RNCC will continue to follow and support dc.

## 2023-09-12 NOTE — PROGRESS NOTES
CIED (Cardiac Implantable Electronic Device) Interrogation    Indication: Atrial Fibrillation / MRI compatibility  Device: Single Chamber Pacemaker  Implant Date: 9/2019  Model: [employer]  Date of Last Interrogation: 7/12/23  Battery: approximately 5.5 years  Underlying Rhythm: Atrial Fibrillation with ventricular response rate in the 50s.  Presenting Rhythm: Atrial Fibrillation with intermittent RV pacing    Generator:  [employer] Accolade MRI, implanted 9/2019  RV lead:  [employer] [id number], serial #[id number], implanted 9/2019    Settings  Mode: VVIR  Lower Rate Limit: 60 bpm  Upper Tracking Rate: 120 bpm       RV   Capture/Threshold 1.2 V @ 0.4 msec   Intrinsic Amplitude 3.1 mV   Impedance 590 Ohms     Data/Diagnostics:  Ventricular paced: 99%    Events:   Ventricular Arrhythmias: none  MRI protection mode 7/12/23     Setting Changes: None    Summary: This represents a successful [employer] Single Chamber Permanent Pacemaker Device interrogation.  There is excellent battery longevity.  There is adequate sensing, stable lead impedance, and stable threshold.  There are no abandoned leads or capped generator ports.  The system is MRI compatible.  The device is stable and functioning appropriately.

## 2023-09-12 NOTE — PROGRESS NOTES
Patient: Mitchell Sauceda  Location: [facility] 3A 3032  MRN:  [id number]  Today's date:  9/12/2023    Attempted to see patient for therapy. Unable due to medical hold (pending MRI r/o osteo, PT following).

## 2023-09-12 NOTE — PROGRESS NOTES
NEPHROLOGY PROGRESS NOTE    Subjective:   Seen in followup for Acute kidney injury, chronic kidney disease stage IIIb, proteinuria, diabetic nephropathy, status post fall.  Feeling ok. Able to walk with walker. Good po intake. Blames fall on bad knees.       Review of Systems   Constitutional: Negative for activity change.   Musculoskeletal: Positive for arthralgias.   All other systems reviewed and are negative.      Vitals:    09/12/23 0703 09/12/23 0718 09/12/23 0951 09/12/23 1108   BP:  116/72 116/72 129/63   BP Location:  Right upper arm  Right upper arm   Patient Position:  Lying  Sitting   Pulse: 63 69  70   Resp:  18  18   Temp:  36.7 °C (98.1 °F)  36.7 °C (98.1 °F)   TempSrc:  Axillary  Oral   SpO2:  97%  97%   Weight:   (!) 137 kg (302 lb)    Height:   1.829 m (6')        No intake or output data in the 24 hours ending 09/12/23 1304    Physical Exam  Vitals reviewed.   Constitutional:       General: He is not in acute distress.     Appearance: He is obese.   HENT:      Head: Normocephalic.      Right Ear: External ear normal.      Left Ear: External ear normal.      Mouth/Throat:      Mouth: Mucous membranes are moist.   Eyes:      General: No scleral icterus.  Cardiovascular:      Heart sounds:      No friction rub.   Pulmonary:      Effort: No respiratory distress.   Abdominal:      General: There is no distension.   Musculoskeletal:      Cervical back: No rigidity.      Right lower leg: Edema present.      Left lower leg: Edema present.   Skin:     Coloration: Skin is not jaundiced.   Neurological:      Mental Status: He is oriented to person, place, and time.   Psychiatric:         Behavior: Behavior normal.         LABS:  Results from last 7 days   Lab Units 09/12/23  0434 09/11/23  0013 09/10/23  1112   SODIUM mEQ/L 137 137 137   POTASSIUM mEQ/L 3.8 4.6 4.4   CHLORIDE mEQ/L 100 98 98   CO2 mEQ/L 28 29 28   BUN mg/dL 40* 31* 27*   CREATININE mg/dL 2.5* 2.8* 2.5*   EGFR mL/min/1.73m*2 25.3* 22.2* 25.3*    GLUCOSE mg/dL 123* 201* 194*   CALCIUM mg/dL 8.4* 8.9 9.5   ALBUMIN g/dL  --   --  3.9   PHOSPHORUS mg/dL  --  3.0  --    WBC K/uL 7.39 19.80* 17.74*   HEMOGLOBIN g/dL 11.7* 12.0* 13.5*   HEMATOCRIT % 35.7* 36.5* 41.2   PLATELETS K/uL 119* 144* 157       Meds:    Current Facility-Administered Medications:     acetaminophen (TYLENOL) tablet 650 mg, 650 mg, oral, q6h PRN, Fransico Stratton MD, 650 mg at 09/11/23 2041    ampicillin-sulbactam (UNASYN) IVPB 3 g/100 mL NSS, 3 g, intravenous, q12h INT, João Youssef MD, Stopped at 09/12/23 1226    atorvastatin (LIPITOR) tablet 20 mg, 20 mg, oral, Daily, Fransico Stratton MD, 20 mg at 09/12/23 0858    cetirizine (ZyrTEC) tablet 5 mg, 5 mg, oral, Daily, Fransico Stratton MD, 5 mg at 09/12/23 0858    collagenase (SANTYL) 250 unit/gram ointment 1 Application, 1 Application, Topical, Daily, Flory Robins, DPM, 1 Application at 09/11/23 1755    glucose chewable tablet 16-32 g of dextrose, 16-32 g of dextrose, oral, PRN **OR** dextrose 40 % oral gel 15-30 g of dextrose, 15-30 g of dextrose, oral, PRN **OR** glucagon (GLUCAGEN) injection 1 mg, 1 mg, intramuscular, PRN **OR** dextrose 50 % in water (D50) injection 12.5 g, 25 mL, intravenous, PRN, Fransico Stratton MD    ferrous sulfate tablet 325 mg, 325 mg, oral, Daily with breakfast, Fransico Stratton MD, 325 mg at 09/12/23 0858    gabapentin (NEURONTIN) capsule 300 mg, 300 mg, oral, BID, Fransico Stratton MD, 300 mg at 09/12/23 0859    glimepiride (AMARYL) tablet 4 mg, 4 mg, oral, Daily with breakfast, Fransico Stratton MD, 4 mg at 09/12/23 0858    insulin lispro protamine-lispro (75/25) (HumaLOG MIX 75/25) pen 25 Units, 25 Units, subcutaneous, BID AC, Fransico Stratton MD, 25 Units at 09/12/23 0900    insulin lispro U-100 (HumaLOG) pen 3-5 Units, 3-5 Units, subcutaneous, TID with meals, Fransico Stratton MD, 3 Units at 09/10/23 1814    nitroglycerin (NITROSTAT) SL tablet 0.4 mg, 0.4 mg, sublingual, q5 min  PRN, Fransico Stratton MD    oxyCODONE (ROXICODONE) immediate release tablet 5 mg, 5 mg, oral, q6h PRN, Fransico Stratton MD    pantoprazole (PROTONIX) tablet,delayed release (DR/EC) 40 mg, 40 mg, oral, Daily, Fransico Stratton MD, 40 mg at 09/12/23 0858    rivaroxaban (XARELTO) tablet 20 mg, 20 mg, oral, Daily with dinner, Fransico Stratton MD, 20 mg at 09/11/23 1753    sodium chloride 0.9 % infusion, , intravenous, Continuous, Jan, Dominga RODRIGUEZ MD, Last Rate: 80 mL/hr at 09/12/23 1112, Rate Change at 09/12/23 1112    ASSESSMENT:  Principal Problem:    DAXA (acute kidney injury) (CMS/[facility])  Active Problems:    Mixed hyperlipidemia    Benign essential hypertension    Diabetes mellitus (CMS/HCC)    Morbid obesity (CMS/[facility])    Sleep apnea    Persistent atrial fibrillation (CMS/[facility])    History of pulmonary embolism    Chronic systolic congestive heart failure (CMS/[facility])    Elevated troponin I level    Fall    Diabetic leg ulcer (CMS/[facility])    Fracture of right hand      PLAN:  1. Chronic kidney disease stage IIIb-creatinine 1.98 on 7/16/23 per our office records.  Continue to follow-up with Dr. Mcmullen. Urine protein:Cr ratio 512 mg/g on 7/15. He should be on a low dose ARB ie losartan 25 mg daily or valsartan 40 mg daily.   2. Diabetic nephropathy with low-grade proteinuria-Farxiga 10 mg orally daily started last office visit.  Would hold while in acute renal failure.  Should be restarted at lower dose 5 mg daily when Cr returns to baseline.   3. Acute kidney injury- assessment in progress.  Status post fall.  kidney ultrasound was normal on 9/11, he is on rivaroxaban.  PVRs ok, has not needed straight cath for PVR > 350cc.  4. Third middle phalanx intra-articular fracture-per orthopedic surgery    Daryl Pack MD

## 2023-09-12 NOTE — PROGRESS NOTES
Patient: Mitchell Sauceda  Location: [facility] 3A 3032  MRN:  [id number]  Today's date:  9/12/2023    Attempted to see patient for therapy. Unable due to medical hold (pending MRI r/o osteo).

## 2023-09-13 VITALS
SYSTOLIC BLOOD PRESSURE: 120 MMHG | HEIGHT: 72 IN | TEMPERATURE: 98 F | BODY MASS INDEX: 40.9 KG/M2 | OXYGEN SATURATION: 94 % | DIASTOLIC BLOOD PRESSURE: 58 MMHG | HEART RATE: 60 BPM | WEIGHT: 302 LBS | RESPIRATION RATE: 18 BRPM

## 2023-09-13 LAB
ANION GAP SERPL CALC-SCNC: 8 MEQ/L (ref 3–15)
BACTERIA UR CULT: NORMAL
BASOPHILS # BLD: 0.01 K/UL (ref 0.01–0.1)
BASOPHILS NFR BLD: 0.2 %
BUN SERPL-MCNC: 35 MG/DL (ref 7–25)
CALCIUM SERPL-MCNC: 8.4 MG/DL (ref 8.6–10.3)
CHLORIDE SERPL-SCNC: 103 MEQ/L (ref 98–107)
CO2 SERPL-SCNC: 27 MEQ/L (ref 21–31)
CREAT SERPL-MCNC: 2 MG/DL (ref 0.7–1.3)
DIFFERENTIAL METHOD BLD: ABNORMAL
EOSINOPHIL # BLD: 0.13 K/UL (ref 0.04–0.54)
EOSINOPHIL NFR BLD: 2.7 %
ERYTHROCYTE [DISTWIDTH] IN BLOOD BY AUTOMATED COUNT: 14 % (ref 11.6–14.4)
GFR SERPL CREATININE-BSD FRML MDRD: 32.7 ML/MIN/1.73M*2
GLUCOSE BLD-MCNC: 142 MG/DL (ref 70–99)
GLUCOSE BLD-MCNC: 148 MG/DL (ref 70–99)
GLUCOSE BLD-MCNC: 58 MG/DL (ref 70–99)
GLUCOSE SERPL-MCNC: 116 MG/DL (ref 70–99)
HCT VFR BLDCO AUTO: 36.1 % (ref 40.1–51)
HGB BLD-MCNC: 11.7 G/DL (ref 13.7–17.5)
IMM GRANULOCYTES # BLD AUTO: 0.02 K/UL (ref 0–0.08)
IMM GRANULOCYTES NFR BLD AUTO: 0.4 %
LYMPHOCYTES # BLD: 0.46 K/UL (ref 1.2–3.5)
LYMPHOCYTES NFR BLD: 9.5 %
MCH RBC QN AUTO: 30.5 PG (ref 28–33.2)
MCHC RBC AUTO-ENTMCNC: 32.4 G/DL (ref 32.2–36.5)
MCV RBC AUTO: 94 FL (ref 83–98)
MONOCYTES # BLD: 0.37 K/UL (ref 0.3–1)
MONOCYTES NFR BLD: 7.6 %
NEUTROPHILS # BLD: 3.86 K/UL (ref 1.7–7)
NEUTS SEG NFR BLD: 79.6 %
NRBC BLD-RTO: 0 %
PDW BLD AUTO: 10.4 FL (ref 9.4–12.4)
PLATELET # BLD AUTO: 137 K/UL (ref 150–350)
POCT TEST: ABNORMAL
POTASSIUM SERPL-SCNC: 4 MEQ/L (ref 3.5–5.1)
RBC # BLD AUTO: 3.84 M/UL (ref 4.5–5.8)
SODIUM SERPL-SCNC: 138 MEQ/L (ref 136–145)
WBC # BLD AUTO: 4.85 K/UL (ref 3.8–10.5)

## 2023-09-13 PROCEDURE — 94660 CPAP INITIATION&MGMT: CPT

## 2023-09-13 PROCEDURE — 63700000 HC SELF-ADMINISTRABLE DRUG: Performed by: INTERNAL MEDICINE

## 2023-09-13 PROCEDURE — 36415 COLL VENOUS BLD VENIPUNCTURE: CPT | Performed by: STUDENT IN AN ORGANIZED HEALTH CARE EDUCATION/TRAINING PROGRAM

## 2023-09-13 PROCEDURE — 85025 COMPLETE CBC W/AUTO DIFF WBC: CPT | Performed by: STUDENT IN AN ORGANIZED HEALTH CARE EDUCATION/TRAINING PROGRAM

## 2023-09-13 PROCEDURE — 63600000 HC DRUGS/DETAIL CODE: Mod: JZ | Performed by: INTERNAL MEDICINE

## 2023-09-13 PROCEDURE — 99239 HOSP IP/OBS DSCHRG MGMT >30: CPT | Performed by: STUDENT IN AN ORGANIZED HEALTH CARE EDUCATION/TRAINING PROGRAM

## 2023-09-13 PROCEDURE — 25800000 HC PHARMACY IV SOLUTIONS: Performed by: INTERNAL MEDICINE

## 2023-09-13 PROCEDURE — 97162 PT EVAL MOD COMPLEX 30 MIN: CPT | Mod: GP

## 2023-09-13 PROCEDURE — 97166 OT EVAL MOD COMPLEX 45 MIN: CPT | Mod: GO

## 2023-09-13 PROCEDURE — 80048 BASIC METABOLIC PNL TOTAL CA: CPT | Performed by: STUDENT IN AN ORGANIZED HEALTH CARE EDUCATION/TRAINING PROGRAM

## 2023-09-13 RX ORDER — INSULIN LISPRO 100 [IU]/ML
20 INJECTION, SUSPENSION SUBCUTANEOUS 2 TIMES DAILY
Qty: 12 ML | Refills: 0
Start: 2023-09-13 | End: 2023-10-13

## 2023-09-13 RX ORDER — LINEZOLID 600 MG/1
600 TABLET, FILM COATED ORAL 2 TIMES DAILY
Qty: 22 TABLET | Refills: 0 | Status: SHIPPED | OUTPATIENT
Start: 2023-09-13 | End: 2023-09-24

## 2023-09-13 RX ORDER — LINEZOLID 600 MG/1
600 TABLET, FILM COATED ORAL 2 TIMES DAILY
Status: DISCONTINUED | OUTPATIENT
Start: 2023-09-13 | End: 2023-09-13 | Stop reason: HOSPADM

## 2023-09-13 RX ORDER — LANOLIN ALCOHOL/MO/W.PET/CERES
1000 CREAM (GRAM) TOPICAL DAILY
Qty: 30 TABLET | Refills: 0
Start: 2023-09-13 | End: 2023-10-13

## 2023-09-13 RX ORDER — INSULIN LISPRO 100 [IU]/ML
20 INJECTION, SUSPENSION SUBCUTANEOUS
Status: DISCONTINUED | OUTPATIENT
Start: 2023-09-13 | End: 2023-09-13 | Stop reason: HOSPADM

## 2023-09-13 RX ADMIN — GLIMEPIRIDE 4 MG: 2 TABLET ORAL at 09:21

## 2023-09-13 RX ADMIN — GABAPENTIN 300 MG: 300 CAPSULE ORAL at 09:20

## 2023-09-13 RX ADMIN — FERROUS SULFATE TAB 325 MG (65 MG ELEMENTAL FE) 325 MG: 325 (65 FE) TAB at 09:21

## 2023-09-13 RX ADMIN — SODIUM CHLORIDE: 9 INJECTION, SOLUTION INTRAVENOUS at 09:28

## 2023-09-13 RX ADMIN — CETIRIZINE HYDROCHLORIDE 5 MG: 5 TABLET ORAL at 09:20

## 2023-09-13 RX ADMIN — COLLAGENASE SANTYL 1 APPLICATION: 250 OINTMENT TOPICAL at 09:21

## 2023-09-13 RX ADMIN — INSULIN LISPRO 25 UNITS: 100 INJECTION, SUSPENSION SUBCUTANEOUS at 09:19

## 2023-09-13 RX ADMIN — ACETAMINOPHEN 650 MG: 325 TABLET ORAL at 09:27

## 2023-09-13 RX ADMIN — PANTOPRAZOLE SODIUM 40 MG: 40 TABLET, DELAYED RELEASE ORAL at 09:21

## 2023-09-13 RX ADMIN — LINEZOLID 600 MG: 600 TABLET, FILM COATED ORAL at 12:56

## 2023-09-13 RX ADMIN — AMPICILLIN AND SULBACTAM 3 G: 2; 1 INJECTION, POWDER, FOR SOLUTION INTRAMUSCULAR; INTRAVENOUS at 00:48

## 2023-09-13 RX ADMIN — ATORVASTATIN CALCIUM 20 MG: 20 TABLET, FILM COATED ORAL at 09:21

## 2023-09-13 ASSESSMENT — COGNITIVE AND FUNCTIONAL STATUS - GENERAL
DRESSING REGULAR LOWER BODY CLOTHING: 2 - A LOT
MOVING TO AND FROM BED TO CHAIR: 3 - A LITTLE
AFFECT: WFL
CLIMB 3 TO 5 STEPS WITH RAILING: 2 - A LOT
WALKING IN HOSPITAL ROOM: 3 - A LITTLE
WALKING IN HOSPITAL ROOM: 3 - A LITTLE
AFFECT: WFL
DRESSING REGULAR UPPER BODY CLOTHING: 3 - A LITTLE
EATING MEALS: 4 - NONE
STANDING UP FROM CHAIR USING ARMS: 3 - A LITTLE
TOILETING: 4 - NONE
MOVING TO AND FROM BED TO CHAIR: 3 - A LITTLE
STANDING UP FROM CHAIR USING ARMS: 3 - A LITTLE
HELP NEEDED FOR BATHING: 2 - A LOT
CLIMB 3 TO 5 STEPS WITH RAILING: 2 - A LOT
HELP NEEDED FOR PERSONAL GROOMING: 4 - NONE

## 2023-09-13 ASSESSMENT — ENCOUNTER SYMPTOMS
ACTIVITY CHANGE: 0
ARTHRALGIAS: 1

## 2023-09-13 NOTE — PROGRESS NOTES
Physical Therapy -  Initial Evaluation     Patient: Mitchell Sauceda  Location: [facility] 3A 3032  MRN: [id number]  Today's date: 9/13/2023    HISTORY OF PRESENT ILLNESS     Mitchell is a 73 y.o. male admitted on 9/10/2023 with Cardiac pacemaker [Z95.0]  DAXA (acute kidney injury) (CMS/[facility]) [N17.9]  Chronic systolic congestive heart failure (CMS/[facility]) [I50.22]  Open nondisplaced fracture of distal phalanx of right middle finger, initial encounter [S62.662B]  Fall, initial encounter [W19.XXXA]. Principal problem is DAXA (acute kidney injury) (CMS/[facility]).    Past Medical History  Mitchell has a past medical history of Arthritis, GERD (gastroesophageal reflux disease), Hypertension, Lipid disorder, Neuropathy, Persistent atrial fibrillation (CMS/[facility]) (03/19/2019), Primary osteoarthritis involving multiple joints (05/15/2019), Pulmonary embolism (CMS/[facility]), Type 2 diabetes mellitus (CMS/HCC), Type 2 diabetes mellitus with hypoglycemia (CMS/[facility]) (10/24/2013), and Wound healing, delayed.    History of Present Illness   1. L foot wound infection - recent admission at OSH in July and completion of 3 week course of amox-clav for wound cx growth of MSSA, Strep anginosus, Enterococcus, Candida at that time. MRI on independent review now shows no evidence of osteomyelitis and pt remains afebrile without leukocytosis.     2. Leukocytosis - due to #1 and resolved now     3. DM - mgmt per Primary team       PRIOR LEVEL OF FUNCTION AND LIVING ENVIRONMENT     Prior Level of Function    Flowsheet Row Most Recent Value   Dominant Hand right   Ambulation assistive equipment   Transferring assistive equipment   Toileting independent   Bathing assistive person   Dressing assistive person   Eating independent   IADLs other (see comments)   Driving/Transportation friends/family   Prior Level of Function Comment pt states he walks short distances c RW, long distance c electric w/c, wife assists ADLs   Assistive Device Currently Used at Home  walker, front-wheeled, shower chair, wheelchair        Prior Living Environment    Flowsheet Row Most Recent Value   People in Home spouse   Current Living Arrangements home   Living Environment Comment ramp to enter, 1st floor set up, stall shower c chair        VITALS AND PAIN     PT Vitals    Date/Time Pulse SpO2 Pt Activity O2 Therapy BP BP Location BP Method Pt Position [facility]   09/13/23 1146 64 95 % At rest None (Room air) -- -- -- -- JLD   09/13/23 1158 84 95 % At rest None (Room air) 138/67 Left upper arm Automatic Sitting JLD      PT Pain    Date/Time Pain Type Rating: Rest Rating: Activity [facility]   09/13/23 1146 Pain Assessment 0 0 JLD           Objective   OBJECTIVE     Start time:  1143  End time:  1200  Session Length: 17 min  Mode of Treatment: physical therapy, co-treatment (facilitate d/c)    General Observations  Patient received upright, in chair. He was agreeable to therapy, no issues or concerns identified by nurse prior to session. awake, NAD, eager for d/c    Precautions: fall, weight bearing (WBAT BLEs per podiatry note, activity as tolerated)              PT Eval and Treat - 09/13/23 1143        Cognition    Orientation Status oriented x 3     Affect/Mental Status WFL     Follows Commands WFL;follows one-step commands;over 90% accuracy     Cognitive Function WFL     Comment, Cognition pleasant and cooperative        Vision Assessment/Intervention    Vision Assessment corrective lenses for reading        Hearing Assessment    Hearing Status WFL        Sensory Assessment    Sensory Assessment sensation intact, lower extremities        Edema Symptoms/Interventions    Location lower extremity, right;lower extremity, left        Lower Extremity Assessment    LE Assessment ROM and Strength WFL except     General Observations BLE edema grossly limiting ROM        Lower Extremity Range of Motion    Knee, Left (ROM) decreasded TKE with genu varu deformity     Knee, Right (ROM) decreasded TKE with genu varu  deformity        Lower Extremity Strength    Hip, Left (Strength) grossly 4-/5 seated flexion     Hip, Right (Strength) grossly 4-/5 seated flexion        Motor Skills    Motor Skills postural deviations     Coordination WFL;bilateral;lower extremity     Muscle Tone WNL        Postural Deviations    Postural Deviations head and neck     Head and Neck forward head        Bed Mobility    [place] not tested     Comment remained OOB t/o encounter, pt reporting no concerns with bed mobility        Mobility Belt    Mobility Belt Used for All Out of Bed Activity yes        Sit/Stand Transfer    Surface chair with arm rests     [place] modified independence     Safety/Cues minimal;verbal cues;hand placement     Assistive Device bariatric;walker, front-wheeled     Transfer Comments educated pt on safe hand positioning with sit-stand transfers and pt places RUE on RW to stand, fair eccentric control upond return to chair        Gait Training    [place], Gait supervision;modified independence     Assistive Device bariatric;walker, front-wheeled     Distance in Feet 30 feet     Pattern step-through     Deviations/Abnormal Patterns bilateral deviations;step length decreased;gait speed decreased;weight shifting decreased     Bilateral Gait Deviations heel strike decreased     Comment (Gait/Stairs) short HHD ambulation per baseline, minimal foot floor clearance b/l, no LOB, SOB or pain c/o with task, appropriately manuevering RW within confined space of room        Stairs Training    [place], Stairs not tested     Comment not a barrier to d/c, pt reports ramped entry and FFSU        Balance    Static Sitting Balance WFL;sitting in chair     Dynamic Sitting Balance WFL;supported;sitting in chair     Sit to Stand Dynamic Balance WFL;supported   self    Static Standing Balance WFL;supported     Dynamic Standing Balance WFL;supported     Comment, Balance support of RW in stance, flexed fwd trunk with heavy BUE wt  bearing through device        Impairments/Safety Issues    Impairments Affecting Function balance;endurance/activity tolerance;strength;range of motion (ROM);other (see comments)   edema    Functional Endurance appears at baseline, deconditioned     Safety Issues Affecting Function safety precautions follow-through/compliance                               Education Documentation  Activity, taught by Ilana Bender, PT at 9/13/2023 12:43 PM.  Learner: Patient  Readiness: Acceptance  Method: Explanation, Demonstration  Response: Verbalizes Understanding, Needs Reinforcement  Comment: role of PT, safety with RW, LE elevation for edema management, d/c recs        Session Outcome  Patient upright, in chair at end of session, chair alarm on, all needs met, call light in reach, personal items in reach. Nursing notified about patient's performance.    AM-PAC - Mobility (Current Function)     Turning form your back to your side while in flat bed without using bedrails 4 - None   Moving from lying on your back to sitting on the side of a flat bed without using bedrails 3 - A Little   Moving to and from a bed to a chair 3 - A Little   Standing up from a chair using your arms 3 - A Little   To walk in a hospital room 3 - A Little   Climbing 3-5 steps with a railing 2 - A Lot   AM-PAC Mobility Score 18      ASSESSMENT AND PLAN     Progress Summary  PT eval completed, pt admit from home s/p fall.  pt reporting baseline mobility impairments, primarily using power w/c but able to transfers and amb short distances with RW, spouse assists with ADLs.  on PT eval, pt reporting no pain and demonstrating mod I for transfers and short distance ambulation with RW in room.  VSS with activity.  pt appears at his functional baseline, rec home with baseline supports in place and HH PT when medically cleared.  no further acute PT needs identified, will sign off.    Patient/Family Therapy Goals Statement: home today    PT Plan     Flowsheet Row Most Recent Value   Therapy Frequency evaluation only at 09/13/2023 1143          PT Discharge Recommendations    Flowsheet Row Most Recent Value   PT Recommended Discharge Disposition home with home health, home with assistance at 09/13/2023 1143   Anticipated Equipment Needs at Discharge (PT) none  [pt reports owning appropriate DME] at 09/13/2023 1143

## 2023-09-13 NOTE — PLAN OF CARE
Problem: Adult Inpatient Plan of Care  Goal: Plan of Care Review  Outcome: Progressing  Flowsheets (Taken 9/13/2023 1242)  Progress: improving  Outcome Evaluation: PT eval completed, SUP with RW for transfers and short distance ambulation, appears at baseline, rec home with assist and  PT when medically cleared  Plan of Care Reviewed With: patient

## 2023-09-13 NOTE — PLAN OF CARE
Care Coordination Discharge Plan Summary    Admission Assessment Summary    General Information  Readmission Within the last 30 days: no previous admission in last 30 days  Does patient have a :    Patient-Specific Goals (include timeframe): Patient wants to go home    Living Arrangements  Arrived From: home  Current Living Arrangements: home  People in Home: spouse  Home Accessibility:    Living Arrangement Comments: Patient lives with spouse in a 2sh home with first floor set up - handicapped equipped BR.  Patient does not drive    Social Determinants of Health - Screenings  Housing Stability  In the last 12 months, was there a time when you were not able to pay the mortgage or rent on time?: No  In the last 12 months, was there a time when you did not have a steady place to sleep or slept in a shelter (including now)?: No  Financial Resource Strain  How hard is it for you to pay for the very basics like food, housing, medical care, and heating?: Not hard at all  Transportation Needs  In the past 12 months, has lack of transportation kept you from medical appointments or from getting medications?: No  In the past 12 months, has lack of transportation kept you from meetings, work, or from getting things needed for daily living?: No    Functional Status Prior to Admission  Assistive Device/Animal Currently Used at Home: walker, front-wheeled  Functional Status Comments:    IADL Comments:      Discharge Needs Assessment    Concerns to be Addressed: discharge planning  Current Discharge Risk:    Anticipated Changes Related to Illness: inability to care for someone else    Discharge Plan Summary    Patient Choice  Offered/Gave Vendor List: no       Concerns / Comments: Patient is r/o osteo and is pending MRI to confirm - IV ab - may need long term abx/PICC - homecare with home infusion?    Discharge Plan:  Disposition/Destination: Home  / Home       Connection to Community  Not applicable    Community  Resources:      Discharge Transportation:  Is Out of Hospital DNR needed at Discharge: no  Does patient need discharge transport?  (family will provide)      Discharge order noted, RNCC met with patient at bedside to review dispo plan for today. PT/OT pending.  RNCC offered home care and patient has declined stating that he sees outpt wound care clinic and has a RN visit once a week for wound checks.  zyvox priced for $4.15 for 22 tablets.   RNCC also LV for spouse to review dispo plan.  RNCC explained IMM letter to patient and he is in agreement with the d/c by signing the IMM letter.  RNCC will continue to follow and support dc.     Add: 11:58 patient's spouse returned CCM's call and is in agreement with dispo and will transport patient home.  Spouse is resuming all wound care and notified their wound care RN Lizy of pt's discharge.  Patient does visit the wound care clinic and has daily wound care changes.  RNCC will continue to follow and support dc.

## 2023-09-13 NOTE — PROGRESS NOTES
Infectious Disease Progress Note    Patient Name: Mitchell Sauceda  MR#: [id number]  : 1950  Admission Date: 9/10/2023  Date: 23   Time: 9:55 AM   Author: João Youssef MD    Major Events: none     Antibiotics:    Anti-infectives (From admission, onward)    Start     Dose/Rate Route Frequency Ordered Stop    23 1230  ampicillin-sulbactam (UNASYN) IVPB 3 g/100 mL NSS         3 g  200 mL/hr over 30 Minutes intravenous Every 12 hours interval 23 1120            Subjective     Review of Systems    Feels well and no nausea, vomiting, diarrhea being reported. No acute events noted overnight and remainder of 12 ROS is unchanged.     Objective     Vital Signs:    Patient Vitals for the past 72 hrs:   BP Temp Temp src Pulse Resp SpO2 Height Weight   23 0700 128/88 (!) 36 °C (96.8 °F) Axillary 62 16 97 % -- --   23 0400 (!) 161/73 36.7 °C (98.1 °F) Oral 60 18 97 % -- --   23 0320 -- -- -- 60 -- -- -- --   23 2302 140/75 36.8 °C (98.3 °F) Oral 60 18 97 % -- --   23 2000 125/80 36.7 °C (98 °F) Oral 62 20 96 % -- --   23 1908 -- -- -- 63 -- -- -- --   23 1645 124/70 36.9 °C (98.4 °F) Oral 77 20 96 % -- --   23 1634 -- -- -- 77 -- -- -- --   23 1108 129/63 36.7 °C (98.1 °F) Oral 70 18 97 % -- --   23 0951 116/72 -- -- -- -- -- 1.829 m (6') (!) 137 kg (302 lb)   23 0718 116/72 36.7 °C (98.1 °F) Axillary 69 18 97 % -- --   23 0703 -- -- -- 63 -- -- -- --   23 0345 136/72 36.6 °C (97.8 °F) Temporal 62 18 95 % -- --   23 0313 -- -- -- (!) 59 -- -- -- --   23 2315 135/74 36.2 °C (97.1 °F) Temporal 62 18 96 % -- --   23 122/63 36.9 °C (98.4 °F) Temporal 63 18 96 % -- --   23 1913 -- -- -- 64 -- -- -- --   23 1514 -- -- -- 62 -- -- -- --   23 1449 (!) 116/55 37 °C (98.6 °F) Oral 61 18 96 % -- --   23 1137 (!) 116/55 36.9 °C (98.4 °F) Temporal 61 -- 95 % -- --   23 0728 (!)  138/56 36.8 °C (98.2 °F) Temporal 60 -- 95 % -- --   23 0704 -- -- -- 60 -- -- -- --   23 0600 125/67 36.9 °C (98.5 °F) Temporal 61 18 96 % -- --   23 0320 -- -- -- 61 -- -- -- --   09/10/23 2303 (!) 118/57 36.6 °C (97.9 °F) Temporal 60 -- 93 % -- --   09/10/23 2055 (!) 120/59 37.7 °C (99.9 °F) Temporal 60 18 98 % -- --   09/10/23 1906 -- -- -- 60 -- -- -- --   09/10/23 1617 -- -- -- 60 -- -- -- --   09/10/23 1606 (!) 155/68 37.9 °C (100.2 °F) Oral 60 18 96 % 1.829 m (6') (!) 137 kg (302 lb 4.8 oz)   09/10/23 1513 133/67 36.6 °C (97.8 °F) Temporal 60 18 95 % -- --   09/10/23 1405 (!) 110/55 37.4 °C (99.3 °F) Oral (!) 58 18 96 % -- --   09/10/23 1318 (!) 114/57 -- -- 61 16 98 % -- --   09/10/23 1209 (!) 111/58 -- -- 60 18 96 % -- --   09/10/23 1100 (!) 112/56 37.3 °C (99.2 °F) Temporal 67 16 94 % 1.829 m (6') (!) 142 kg (312 lb)       Temp (72hrs), Av.9 °C (98.4 °F), Min:36 °C (96.8 °F), Max:37.9 °C (100.2 °F)      Physical Exam:    General appearance: alert and cooperative  Head: normocephalic, without obvious abnormality, atraumatic  Eyes: anicteric sclera  Neck: supple  Lungs: clear to auscultation bilaterally  Heart: regular rate and rhythm  Abdomen: soft, non-tender  Extremities: lymphedema b/l LE. L foot dressing intact  Joints: no swelling  Skin: chronic venous stasis b/l LE  Neurologic: grossly normal    Lines, Drains, Airways, Wounds:  Peripheral IV (Adult) 09/10/23 Posterior;Right Hand (Active)   Number of days: 3       Peripheral IV (Adult) 09/10/23 Left;Posterior Hand (Active)   Number of days: 3       Wound Pressure Injury Right;Left Buttock (Active)   Number of days: 3       Wound Venous Ulcer Left Heel (Active)   Number of days: 3       Wound Anterior;Left Groin (Active)   Number of days: 3       Surgical Incision Finger (Middle) Posterior;Right (Active)   Number of days: 3       Labs:    CBC Results       23     0858 0434 0013    WBC 4.85 7.39 19.80    RBC  3.84 3.81 3.91    HGB 11.7 11.7 12.0    HCT 36.1 35.7 36.5    MCV 94.0 93.7 93.4    MCH 30.5 30.7 30.7    MCHC 32.4 32.8 32.9     119 144      BMP Results       09/13/23 09/12/23 09/11/23     0858 0434 0013     137 137    K 4.0 3.8 4.6    Cl 103 100 98    CO2 27 28 29    Glucose 116 123 201    BUN 35 40 31    Creatinine 2.0 2.5 2.8    Calcium 8.4 8.4 8.9    Anion Gap 8 9 10    EGFR 32.7 25.3 22.2      PT/PTT Results       04/17/15 03/19/15 02/18/15           PT 31.0 31.3 25.1    INR 2.8 2.9 2.3         Comment for PT on 04/17/15: For more information on this test, go to: [email or web address]/[email or web address]    Test performed at [facility] [address]  [address] Director: YELITZA CUEVAS MD PHD    Comment for PT on 03/19/15: For more information on this test, go to: [email or web address]/[email or web address]    Test performed at [facility] [address]  [address] Director: YELITZA CUEVAS MD PHD    Comment for PT on 02/18/15: For more information on this test, go to: [email or web address]/[email or web address]    Test performed at [facility] [address]  [address] Director: YELITZA CUEVAS MD PHD    Comment for INR on 04/17/15: Reference Range                     0.9-1.1 Moderate-intensity Warfarin Therapy 2.0-3.0 Higher-intensity Warfarin Therapy   3.0-4.0    Comment for INR on 03/19/15: Reference Range                     0.9-1.1 Moderate-intensity Warfarin Therapy 2.0-3.0 Higher-intensity Warfarin Therapy   3.0-4.0    Comment for INR on 02/18/15: Reference Range                     0.9-1.1 Moderate-intensity Warfarin Therapy 2.0-3.0 Higher-intensity Warfarin Therapy   3.0-4.0      UA Results       09/11/23     1156    Color Yellow     Yellow    Clarity Clear     Clear    Glucose 250 (MODERATE)     250 (MODERATE)    Bilirubin Negative     Negative     Ketones Trace     Negative    Sp Grav >1.030     >1.030    Blood Negative     Negative    Ph 6.0     6.0    Protein +1     +1    Urobilinogen 0.2     0.2    Nitrite Negative     Negative    Leuk Est Negative     Negative    WBC 0 TO 3     0 TO 3    RBC 0 TO 4     0 TO 4    Bacteria Rare     Rare         Comment for Ketones at 1156 on 09/11/23: Free sulfhydryl drugs such as Mesna, Capoten, and Acetylcysteine (Mucomyst) may cause false positive ketonuria.    Comment for Blood at 1156 on 09/11/23: The sensitivity of the occult blood test is equivalent to approximately 4 intact RBC/HPF.    Comment for Leuk Est at 1156 on 09/11/23: Results can be falsely negative due to high specific gravity, some antibiotics, glucose >3 g/dl, or WBC other than neutrophils.    Comment for Leuk Est at 1156 on 09/11/23: Results can be falsely negative due to high specific gravity, some antibiotics, glucose >3 g/dl, or WBC other than neutrophils.      Lactate Results    No lab values to display.         Microbiology Results     Procedure Component Value Units Date/Time    Urine culture Urine, Clean Catch [[id number]]  (Normal) Collected: 09/11/23 1156    Specimen: Urine, Clean Catch Updated: 09/13/23 0733     Urine Culture <10,000 cfu/mL    Blood Culture Blood, Venous [[id number]]  (Normal) Collected: 09/10/23 1302    Specimen: Blood, Venous Updated: 09/12/23 1801     Culture No growth at 48 hours    Blood Culture Blood, Venous [[id number]]  (Normal) Collected: 09/10/23 1302    Specimen: Blood, Venous Updated: 09/12/23 1801     Culture No growth at 48 hours          Pathology Results     ** No results found for the last 720 hours. **          Echo:     Cardiac Imaging    TRANSTHORACIC ECHO (TTE) COMPLETE 09/12/2023    Interpretation Summary    Left Ventricle: Mildly dilated ventricle size. Left ventricular hypertrophy. Muscle mass is increased. Mildly decreased systolic function. Estimated EF 45-50%. Diffuse mild hypokinesis. Diastolic  function: patient in a-fib.    Right Ventricle: Normal ventricle size. Mildly reduced systolic function.    Mitral Valve: Normal leaflet structure. Normal leaflet motion. Mild regurgitation. No stenosis.    Tricuspid Valve: Normal structure. Mild regurgitation. Estimated RVSP = 27 mmHg. No significant stenosis.    Left Atrium: Normal sized atrium. Left atrial volume index (BEHZAD A4C) is 31.55 cm3/m2.      Imaging:    Radiology Imaging    XR FINGER 2+ VW RIGHT    Narrative  CLINICAL HISTORY:  Postprocedure    --    Impression  Stable distraction of the fracture fragments at the base of the  third distal phalanx.    COMPARISON: Right hand and finger radiographs 9/10/2023.    COMMENT: AP and lateral views of the right third digit are completed post  procedure.  The intra-articular fracture at the base of the third distal phalanx is in  stable position compared with earlier exams.  There is some radiopaque material in the right renal bed, and in an overlying  gauze dressing.  No soft tissue gas.  Normal alignment at the third DIP joint on the provided images.      Assessment     1. L foot wound infection - recent admission at OSH in July and completion of 3 week course of amox-clav for wound cx growth of MSSA, Strep anginosus, Enterococcus, Candida at that time. MRI on independent review now shows no evidence of osteomyelitis and pt remains afebrile without leukocytosis.     2. Leukocytosis - due to #1 and resolved now     3. DM - mgmt per Primary team     Plan       1. D/C amp-sulbactam and start linezolid for 11 more days to complete 14 day course and maintain ongoing wound care.

## 2023-09-13 NOTE — PROGRESS NOTES
09/13/23 1207   Hospital to Home   Patient Enrolled in [facility]? Yes   [facility] Coordinator Comment Spoke w/ pt's spouse (Renu) to discuss Hospital to Home program. She is agreeable to a phone visit. Telemed appt. with [facility] provider scheduled for Friday, Sept. 15, 2023 at 2pm. She declined assistance making other follow-up appt's.

## 2023-09-13 NOTE — PROGRESS NOTES
Occupational Therapy -  Initial Evaluation     Patient: Mitchell Sauceda  Location: [facility] 3A 3032  MRN: [id number]  Today's date: 9/13/2023    HISTORY OF PRESENT ILLNESS     Mitchell is a 73 y.o. male admitted on 9/10/2023 with Cardiac pacemaker [Z95.0]  DAXA (acute kidney injury) (CMS/[facility]) [N17.9]  Chronic systolic congestive heart failure (CMS/[facility]) [I50.22]  Open nondisplaced fracture of distal phalanx of right middle finger, initial encounter [S62.662B]  Fall, initial encounter [W19.XXXA]. Principal problem is DAXA (acute kidney injury) (CMS/[facility]).    Past Medical History  Mitchell has a past medical history of Arthritis, GERD (gastroesophageal reflux disease), Hypertension, Lipid disorder, Neuropathy, Persistent atrial fibrillation (CMS/[facility]) (03/19/2019), Primary osteoarthritis involving multiple joints (05/15/2019), Pulmonary embolism (CMS/[facility]), Type 2 diabetes mellitus (CMS/HCC), Type 2 diabetes mellitus with hypoglycemia (CMS/[facility]) (10/24/2013), and Wound healing, delayed.    History of Present Illness   1. L foot wound infection - recent admission at OSH in July and completion of 3 week course of amox-clav for wound cx growth of MSSA, Strep anginosus, Enterococcus, Candida at that time. MRI on independent review now shows no evidence of osteomyelitis and pt remains afebrile without leukocytosis.     2. Leukocytosis - due to #1 and resolved now     3. DM - mgmt per Primary team       PRIOR LEVEL OF FUNCTION AND LIVING ENVIRONMENT     Prior Level of Function    Flowsheet Row Most Recent Value   Dominant Hand right   Ambulation assistive equipment   Transferring assistive equipment   Toileting independent   Bathing assistive person   Dressing assistive person   Eating independent   IADLs other (see comments)   Driving/Transportation friends/family   Prior Level of Function Comment pt states he walks short distances c RW, long distance c electric w/c, wife assists ADLs   Assistive Device Currently Used at  Home walker, front-wheeled, shower chair, wheelchair        Prior Living Environment    Flowsheet Row Most Recent Value   People in Home spouse   Current Living Arrangements home   Living Environment Comment ramp to enter, 1st floor set up, stall shower c chair          VITALS AND PAIN     OT Vitals    Date/Time Pulse SpO2 Pt Activity O2 Therapy BP BP Location BP Method Pt Position [facility]   09/13/23 1146 64 95 % At rest None (Room air) -- -- -- -- JLD   09/13/23 1158 84 95 % At rest None (Room air) 138/67 Left upper arm Automatic Sitting JLD      OT Pain    Date/Time Pain Type Rating: Rest Rating: Activity [facility]   09/13/23 1146 Pain Assessment 0 0 JLD           Objective   OBJECTIVE     Start time:  1144  End time:  1200  Session Length: 16 min  Mode of Treatment: occupational therapy, co-treatment (expedite d/c planning)    General Observations  Patient received in chair, upright. He was agreeable to therapy. pt agreeable to therapy    Precautions: fall, weight bearing  Extremity Weight-Bearing Status: left lower extremity, right lower extremity  Left LE Weight-Bearing Status: weight-bearing as tolerated (WBAT)  Right LE Weight-Bearing Status: weight-bearing as tolerated (WBAT)     Services  Do You Speak a Language Other Than English at Home?: no      OT Eval and Treat - 09/13/23 1144        Cognition    Orientation Status oriented x 3     Affect/Mental Status WFL     Follows Commands WFL     Cognitive Function WFL        Vision Assessment/Intervention    Vision Assessment WFL        Hearing Assessment    Hearing Status WFL        Sensory Assessment    Sensory Assessment sensation intact, upper extremities        Upper Extremity Assessment    UE Assessment ROM and Strength WFL        Sit/Stand Transfer    Surface chair with arm rests     [place] modified independence     Assistive Device walker, front-wheeled        Toilet Transfer    Comment pt declines transfer, stating he has done it and no concerns         Shower/Tub Transfer    Comment pt educated on safe stall shower transfer        Functional Mobility    Distance in room/bathroom     Functional Mobility [place] modified independence     Assistive Device walker, front-wheeled        Lower Body Dressing    Tasks don;socks     [place] maximum assist (25-49% patient effort)     Position supported sitting     Adaptive Equipment none     Comment pt states wife to assist        Self-Feeding    Tasks liquids to mouth     [place] independent     Position supported sitting     Adaptive Equipment none        Balance    Static Sitting Balance WFL;sitting in chair        Impairments/Safety Issues    Impairments Affecting Function strength;endurance/activity tolerance                               Education Documentation  Self-Care, taught by Magy Conde OT at 9/13/2023 12:32 PM.  Learner: Patient  Readiness: Acceptance  Method: Explanation  Response: Verbalizes Understanding  Comment: role of OT, ADLs, safe transfers        Session Outcome  Patient in chair, upright at end of session, chair alarm on, call light in reach, personal items in reach, all needs met. Nursing notified about patient's response to therapy/activity and patient's performance.    AM-PAC - ADL (Current Function)     Putting on/taking off regular lower body clothing 2 - A Lot   Bathing 2 - A Lot   Toileting 4 - None   Putting on/taking off regular upper body clothing 3 - A Little   Help for taking care of personal grooming 4 - None   Eating meals 4 - None   AM-PAC ADL Score 19      ASSESSMENT AND PLAN     Progress Summary  OT gerhardal, pt mod ind func transfers sit<>stand, maxA LB dressing (spouse assists at home), mod ind func mobility c RW, pt states he has indl'y been toileting/performing toilet transfers but declines this session, pt appears at/close to functional baseline, with no further skilled OT needs, d/c OT services    Patient/Family Therapy Goal Statement: to go home    OT  Plan    Flowsheet Row Most Recent Value   Therapy Frequency evaluation only at 09/13/2023 1144          OT Discharge Recommendations    Flowsheet Row Most Recent Value   OT Recommended Discharge Disposition home with assistance at 09/13/2023 1144   Anticipated Equipment Needs At Discharge (OT) none at 09/13/2023 1144

## 2023-09-13 NOTE — PROGRESS NOTES
NEPHROLOGY PROGRESS NOTE    Subjective: Seen in followup for Acute kidney injury, chronic kidney disease stage IIIb, proteinuria, diabetic nephropathy, status post fall.  Feeling ok. Able to walk with walker. Good po intake. Blames fall on bad knees.       Review of Systems   Constitutional: Negative for activity change.   Musculoskeletal: Positive for arthralgias.   All other systems reviewed and are negative.      Vitals:    09/13/23 0700 09/13/23 1106 09/13/23 1146 09/13/23 1158   BP: 128/88 130/63  138/67   BP Location: Right upper arm Right upper arm  Left upper arm   Patient Position: Lying Lying  Sitting   Pulse: 62 61 64 84   Resp: 16      Temp: (!) 36 °C (96.8 °F) 36.5 °C (97.7 °F)     TempSrc: Axillary Oral     SpO2: 97% 95% 95% 95%   Weight:       Height:             Intake/Output Summary (Last 24 hours) at 9/13/2023 1356  Last data filed at 9/13/2023 0900  Gross per 24 hour   Intake 120 ml   Output --   Net 120 ml       Physical Exam  Vitals reviewed.   Constitutional:       General: He is not in acute distress.     Appearance: He is obese.   HENT:      Head: Normocephalic.      Right Ear: External ear normal.      Left Ear: External ear normal.      Mouth/Throat:      Mouth: Mucous membranes are moist.   Eyes:      General: No scleral icterus.  Cardiovascular:      Heart sounds:      No friction rub.   Pulmonary:      Effort: No respiratory distress.   Abdominal:      General: There is no distension.   Musculoskeletal:      Cervical back: No rigidity.      Right lower leg: Edema present.      Left lower leg: Edema present.   Skin:     Coloration: Skin is not jaundiced.   Neurological:      Mental Status: He is oriented to person, place, and time.   Psychiatric:         Behavior: Behavior normal.         LABS:  Results from last 7 days   Lab Units 09/13/23  0858 09/12/23  0434 09/11/23  0013 09/10/23  1112   SODIUM mEQ/L 138   < > 137 137   POTASSIUM mEQ/L 4.0   < > 4.6 4.4   CHLORIDE mEQ/L 103   < > 98  98   CO2 mEQ/L 27   < > 29 28   BUN mg/dL 35*   < > 31* 27*   CREATININE mg/dL 2.0*   < > 2.8* 2.5*   EGFR mL/min/1.73m*2 32.7*   < > 22.2* 25.3*   GLUCOSE mg/dL 116*   < > 201* 194*   CALCIUM mg/dL 8.4*   < > 8.9 9.5   ALBUMIN g/dL  --   --   --  3.9   PHOSPHORUS mg/dL  --   --  3.0  --    WBC K/uL 4.85   < > 19.80* 17.74*   HEMOGLOBIN g/dL 11.7*   < > 12.0* 13.5*   HEMATOCRIT % 36.1*   < > 36.5* 41.2   PLATELETS K/uL 137*   < > 144* 157    < > = values in this interval not displayed.       Meds:    Current Facility-Administered Medications:     acetaminophen (TYLENOL) tablet 650 mg, 650 mg, oral, q6h PRN, Fransico Stratton MD, 650 mg at 09/13/23 0927    atorvastatin (LIPITOR) tablet 20 mg, 20 mg, oral, Daily, Fransico Stratton MD, 20 mg at 09/13/23 0921    cetirizine (ZyrTEC) tablet 5 mg, 5 mg, oral, Daily, Fransico Stratton MD, 5 mg at 09/13/23 0920    collagenase (SANTYL) 250 unit/gram ointment 1 Application, 1 Application, Topical, Daily, Julienne, Flory Dewey, DPM, 1 Application at 09/13/23 0921    glucose chewable tablet 16-32 g of dextrose, 16-32 g of dextrose, oral, PRN **OR** dextrose 40 % oral gel 15-30 g of dextrose, 15-30 g of dextrose, oral, PRN **OR** glucagon (GLUCAGEN) injection 1 mg, 1 mg, intramuscular, PRN **OR** dextrose 50 % in water (D50) injection 12.5 g, 25 mL, intravenous, PRN, Fransico Stratton MD    ferrous sulfate tablet 325 mg, 325 mg, oral, Daily with breakfast, Fransico Stratton MD, 325 mg at 09/13/23 0921    gabapentin (NEURONTIN) capsule 300 mg, 300 mg, oral, BID, Fransico Stratton MD, 300 mg at 09/13/23 0920    [Provider Managed Hold] glimepiride (AMARYL) tablet 4 mg, 4 mg, oral, Daily with breakfast, Fransico Stratton MD, 4 mg at 09/13/23 0921    insulin lispro protamine-lispro (75/25) (HumaLOG MIX 75/25) pen 25 Units, 25 Units, subcutaneous, BID AC, Fransico Stratton MD, 25 Units at 09/13/23 0919    insulin lispro U-100 (HumaLOG) pen 3-5 Units, 3-5 Units, subcutaneous,  TID with meals, Fransico Stratton MD, 3 Units at 09/10/23 1814    linezolid (ZYVOX) tablet 600 mg, 600 mg, oral, BID, João Youssef MD, 600 mg at 09/13/23 1256    nitroglycerin (NITROSTAT) SL tablet 0.4 mg, 0.4 mg, sublingual, q5 min PRN, Fransico Stratton MD    oxyCODONE (ROXICODONE) immediate release tablet 5 mg, 5 mg, oral, q6h PRN, Fransico Stratton MD    pantoprazole (PROTONIX) tablet,delayed release (DR/EC) 40 mg, 40 mg, oral, Daily, Fransico Stratton MD, 40 mg at 09/13/23 0921    rivaroxaban (XARELTO) tablet 20 mg, 20 mg, oral, Daily with dinner, Chyna Panda CRNP    sodium chloride 0.9 % infusion, , intravenous, Continuous, Jan, Dominga RODRIGUEZ MD, Last Rate: 80 mL/hr at 09/13/23 0928, New Bag at 09/13/23 0928    ASSESSMENT:  Principal Problem:    DAXA (acute kidney injury) (CMS/HCC)  Active Problems:    Mixed hyperlipidemia    Benign essential hypertension    Diabetes mellitus (CMS/HCC)    Morbid obesity (CMS/HCC)    Sleep apnea    Persistent atrial fibrillation (CMS/HCC)    History of pulmonary embolism    Chronic systolic congestive heart failure (CMS/HCC)    Elevated troponin I level    Fall    Diabetic leg ulcer (CMS/HCC)    Fracture of right hand    Pressure injury of buttock, stage 1      PLAN:  1. Chronic kidney disease stage IIIb-creatinine 1.98 on 7/16/23 per our office records.  Continue to follow-up with Dr. Mcmullen. Urine protein:Cr ratio 512 mg/g on 7/15. He should be on a low dose ARB ie losartan 25 mg daily or valsartan 40 mg daily.    2. Diabetic nephropathy with low-grade proteinuria-Farxiga 10 mg orally daily started last office visit.  Would hold while in acute renal failure.  Should be restarted at lower dose 5 mg daily when Cr returns to baseline.   3. Acute kidney injury- assessment in progress.  Status post fall.  kidney ultrasound was normal on 9/11, he is on rivaroxaban.    Creatinine is 2.0.  It peaked at 2.8 from 9/11.  Improving.  4. Third middle phalanx intra-articular  fracture-per orthopedic surgery    James Brown MD

## 2023-09-13 NOTE — PLAN OF CARE
Plan of Care Review  Plan of Care Reviewed With: patient  Progress: no change  Outcome Evaluation: Pt got ECHO and MRI of foot. Metal part of splint was removed for MRI and pt refused to have it put back. C/o pain in finger that was managed with PRN tylenol. BUN and Cr was elevated.

## 2023-09-13 NOTE — PLAN OF CARE
Problem: Adult Inpatient Plan of Care  Goal: Plan of Care Review  Outcome: Progressing  Flowsheets (Taken 9/13/2023 0415)  Progress: no change  Outcome Evaluation: Pt AAOx4. VSS. Afib with pacing on tele. CPAP on overnight. OOB to the bathroom x1 assist with walker. IV team for labs. Bed alarm on and call bell within reach.  Plan of Care Reviewed With: patient   Plan of Care Review  Plan of Care Reviewed With: patient  Progress: no change  Outcome Evaluation: Pt AAOx4. VSS. Afib with pacing on tele. CPAP on overnight. OOB to the bathroom x1 assist with walker. IV team for labs. Bed alarm on and call bell within reach.

## 2023-09-13 NOTE — DISCHARGE SUMMARY
Hospital Medicine Service -  Inpatient Discharge Summary        BRIEF OVERVIEW   Patient: Mitchell Sauceda (1950)    Admitting Provider: Fransico Stratton MD  Attending Provider: Yolie Box MD Attending phys phone: [phone]    PCP: Richard Ojeda [phone]    Admission Date: 9/10/2023  Discharge Date: 9/13/2023     DISCHARGE DIAGNOSES      Primary Discharge Diagnosis  DAXA (acute kidney injury) (CMS/[facility])    Secondary Discharge Diagnoses  Active Hospital Problems    Diagnosis Date Noted    DAXA (acute kidney injury) (CMS/[facility]) 09/10/2023     Priority: High    Pressure injury of buttock, stage 1 09/12/2023    Elevated troponin I level 09/10/2023    Fall 09/10/2023    Diabetic leg ulcer (CMS/[facility]) 09/10/2023    Fracture of right hand 09/10/2023    Chronic systolic congestive heart failure (CMS/[facility]) 05/30/2023    Persistent atrial fibrillation (CMS/HCC) 03/19/2019    Diabetes mellitus (CMS/[facility]) 10/02/2018    Morbid obesity (CMS/[facility]) 08/08/2017    Sleep apnea 08/07/2017    History of pulmonary embolism 06/06/2017    Mixed hyperlipidemia 10/24/2013    Benign essential hypertension 10/24/2013      Resolved Hospital Problems    Diagnosis Date Noted Date Resolved    Hand fracture, left 09/10/2023 09/10/2023    Type 2 diabetes mellitus with hypoglycemia (CMS/[facility]) 10/24/2013 09/10/2023       Problem List on Day of Discharge  * DAXA (acute kidney injury) (CMS/[facility])  Assessment & Plan  Baseline creatinine 1.9 in 7/2023  DAXA on CKD stage III   Today creatinine noted 2 (Bumex, lisinopril and farxiga on hold)  Likely secondary to diuretic use.  We will hold diuretics.  Gentle hydration  Avoid nephrotoxins.  USG kidneys- unremarkable, PVR- no retention  Nephrology consult appreciated    Pressure injury of buttock, stage 1  Assessment & Plan  Unstageable pressure injury to left heel  and pressure injury to right and left buttock Stage 1 established and present on admission  Encourage ambulation, skin  care, wound care per protocol  PT/OT    Fracture of right hand  Assessment & Plan  Acute fracture along the dorsum of the Right third finger at the level of the   distal interphalangeal joint.    Ortho consult appreciated. treated with closed reduction and AlumaFoam splint, urgent operative fixation is not indicated at this time   -Laceration repair over dorsal distal phalanx on lateral and medial aspect   -Closed reduced, AlumaFoam splint placed, ed taped 2nd and 3rd right digits  -Plan is to Maintain splint until follow-up outpatient  -Nonweightbearing R hand   -Clean bandage      Diabetic leg ulcer (CMS/HCC)  Assessment & Plan  Leukocytosis-normalized , no fever. UA negative,  Chest x-ray no obvious pneumonia.  Continue to monitor vital signs closely. Monitor off antibiotics  Could be related to infected left leg wound  Outpatient follow up with podiatry  Blood culture no growth  No evidence of osteo  Going home on linezolid for 11 more days    Chronic systolic congestive heart failure (CMS/HCC)  Assessment & Plan  Stable. bumex on holdd    History of pulmonary embolism  Assessment & Plan  History of DVT and PE in the past.  On Xarelto 20mg daily.    Persistent atrial fibrillation (CMS/HCC)  Assessment & Plan  Paced rhythm.  Continue Xarelto.     Sleep apnea  Assessment & Plan  CPAP at nighttime.    Morbid obesity (CMS/HCC)  Assessment & Plan  Advised on diet and lifestyle modifications    Diabetes mellitus (CMS/HCC)  Assessment & Plan  On long term insulin  Stopped amaryl  farxiga on hold given DAXA on CKD    Benign essential hypertension  Assessment & Plan  Resumed home  Medication except bumex          SUMMARY OF HOSPITALIZATION      Presenting Problem/History of Present Illness  This is a 73 y.o. year-old male admitted on 9/10/2023 with DAXA (acute kidney injury) (CMS/HCC).  presenting to the emergency department today after a fall.  Patient is on blood thinners. ED patient evaluated by orthopedic team for  hand injury. In ED Laboratory revealed worsening renal function elevated troponin level and leukocytosis.  Recent admission in July and completion of 3 week course of amox-clav for wound cx growth of MSSA, Strep anginosus, Enterococcus, Candida at that time.    Hospital Course  Patient was admitted for DAXA on CKD and evaluation of HS troponin elevation. Patient was found to have L foot wound infection,  MRI on independent review now shows no evidence of osteomyelitis.  Leukocytosis resolved. Evaluated by ID, was on Unasyn, switched to linezolid for 11 more days to complete 14 day course and maintain ongoing wound care. Linezolid was priced prior to discharge. Patient has wound care RN coming to his home twice a week and patient is to follow-up with Dr. De Souza at [place] [facility] upon discharge.  Patient's DAXA on CKD improved with gentle hydration, his Cr is close to his baseline. We have continued to hold his Bumex. Needs to see Dr. Mcmullen in 1 week. Lisinopril resumed, held Bumex and Farxiga at discharge. He needs BMP in 1 week. Cleared for dc by nephrology.    Troponin elevation thought to be myocardial injury secondary to CKD, no respiratory or cardiac symptoms, patient appears euvolemic.   ECHO Mildly dilated ventricle size. Left ventricular hypertrophy. Muscle mass is increased. Mildly decreased systolic function. Estimated EF 45-50%. Diffuse mild hypokinesis. No concern for ACS or HF. Cleared for discharge by cardiology. Continued Xarelto at 20mg daily for Afib to decrease risk of stroke.     1 Episode of hypoglycemia while in the hospital, stopped his Amaryl & farxiga, consulted endocrinologist, recommended to reduce Humalog mix to 20 twice a day.   Advised to check blood glucose twice a day at home. Advised to follow up with Dr. Nettles as outpatient with blood glucose readings  Medically stable to be discharged hoe with close follow up with PCP, cardiology, nephrology, podiatry and endocrinologist &  ortho as outpatient.    Exam on Day of Discharge    Physical Exam  General exam : appears age stated,  frail appearing, obese   Head: atraumatic, normocephalic  Eyes : PERRLA, EOMI, no pallor, no icterus  ENT: no lesions, oropharynx pink, mucous membranes moist   Neck: supple, no Lymph nodes, no Thyromegaly, no JVD   CVS : Paced rhythm  Resp:normal accessory muscle usage, clear to auscultation Bilaterally  Abdomen : soft, Nt, BS +, no organomegaly   Extremities : +3 edema, no cyanosis, dressing insitu on left lower leg.  MSK: Right hand injury noted. Dressing insitu  Skin: intact, warm, no rash  Neuro: AAO x3, CN 2-12 intact,  motor strength in all extremities, global weakness.  Psych: normal mood.cooperative    Consults During Admission  IP CONSULT TO NEPHROLOGY  IP CONSULT TO CARDIOLOGY  IP CONSULT TO ORTHOPEDIC SURGERY  IP CONSULT TO WOUND OSTOMY CONTINENCE  IP CONSULT TO NUTRITION SERVICES  IP CONSULT TO INFECTIOUS DISEASE  IP CONSULT TO PODIATRY  IP CONSULT TO ENDOCRINOLOGY    DISCHARGE MEDICATIONS               Medication List      START taking these medications    collagenase ointment  Commonly known as: SANTYL  Start taking on: September 14, 2023  Apply 1 Application topically daily.  Dose: 1 Application     linezolid 600 mg tablet  Commonly known as: ZYVOX  Take 1 tablet (600 mg total) by mouth 2 (two) times a day for 11 days Indications: a bacterial skin infection.  Dose: 600 mg        CHANGE how you take these medications    cyanocobalamin 1,000 mcg tablet  Commonly known as: VITAMIN B12  Take 1 tablet (1,000 mcg total) by mouth daily.  Dose: 1,000 mcg  What changed: how much to take     insulin lispro protamine-lispro (75/25) 100 unit/mL (75-25) pen  Commonly known as: HumaLOG Mix 75-25 KwikPen  Inject 20 Units under the skin 2 (two) times a day.  Dose: 20 Units  What changed:   · medication strength  · how much to take        CONTINUE taking these medications    ascorbic acid 500 mg tablet  Commonly  known as: VITAMIN C  Take 1 tablet by mouth daily.  Dose: 1 tablet     atorvastatin 20 mg tablet  Commonly known as: LIPITOR  Take 1 tablet (20 mg total) by mouth daily.  Dose: 20 mg     BD ULTRA-FINE MICRO PEN NEEDLE MISC  once daily use with Levemir     FREESTYLE BURT 2 READER misc  Check BG as directed. Change every 2 weeks. DX: Z79.4, E 13.40  Generic drug: flash glucose scanning reader     FREESTYLE BURT 2 SENSOR kit  Check BG as directed DX: Z79.4, E 13.40  Generic drug: flash glucose sensor     gabapentin 300 mg capsule  Commonly known as: NEURONTIN  TAKE 1 CAPSULE BY MOUTH TWICE A DAY  Dose: 300 mg     KLOR-CON M20 20 mEq CR tablet  TAKE 1 TABLET BY MOUTH 2 TIMES A DAY.  Generic drug: potassium chloride     lancets misc  for blood glucose monitoring 3x day     lisinopriL 2.5 mg tablet  Commonly known as: PRINIVIL  Take 1 tablet (2.5 mg total) by mouth daily.  Dose: 2.5 mg     pantoprazole 40 mg EC tablet  Commonly known as: PROTONIX  Take 1 tablet (40 mg total) by mouth daily.  Dose: 40 mg     rivaroxaban 20 mg tablet  Commonly known as: XARELTO  Take 1 tablet (20 mg total) by mouth daily with dinner.  Dose: 20 mg     semaglutide 1 mg/dose (2 mg/1.5 mL) subcutaneous injection  Commonly known as: OZEMPIC  Inject 1 mg under the skin every (seven) 7 days.  Dose: 1 mg        STOP taking these medications    bumetanide 1 mg tablet  Commonly known as: BUMEX     FARXIGA 10 mg tablet tablet  Generic drug: dapagliflozin     glimepiride 4 mg tablet  Commonly known as: AMARYL                    PROCEDURES / LABS / IMAGING          Pertinent Labs  Lab Results   Component Value Date    WBC 4.85 09/13/2023    HGB 11.7 (L) 09/13/2023    HCT 36.1 (L) 09/13/2023    MCV 94.0 09/13/2023     (L) 09/13/2023     Lab Results   Component Value Date    GLUCOSE 116 (H) 09/13/2023    CALCIUM 8.4 (L) 09/13/2023     09/13/2023    K 4.0 09/13/2023    CO2 27 09/13/2023     09/13/2023    BUN 35 (H) 09/13/2023     CREATININE 2.0 (H) 09/13/2023         Pertinent Imaging  MRI ANKLE LEFT WITHOUT CONTRAST    Result Date: 9/12/2023  IMPRESSION: No evidence of osteomyelitis. No soft tissue fluid collection seen on this noncontrast exam. Generalized nonspecific subcutaneous edema which could reflect cellulitis or bland edema such as from an underlying hydrostatic etiology. Additional chronic and incidental findings as below. COMMENT: Comparison: None. Technique: Noncontrast MR examination of the left ankle was performed in three imaging planes following standard protocol. SOFT TISSUES: There is superficial soft tissue irregularity at the lateral aspect of the heel likely reflecting the patient's reported open wound. There is no underlying soft tissue fluid collection. There is generalized cutaneous edema. JOINTS: Satisfactory alignment. No midfoot fault or hindfoot valgus. No evidence of acute fracture. The talar dome is unremarkable. No significant tibiotalar or subtalar joint effusion. There is no T1 hypointense bone marrow placement or bone marrow edema like signal to suggest osteomyelitis. There is moderate osteoarthritis at the second through fourth tarsometatarsal joints with articular cartilage loss and marginal osseous spurring and mild subchondral bone marrow edema like signal. There is mild to moderate osteoarthritis with cartilage thinning and marginal osseous spurring at the ankle joint. There is mild cartilage thinning and marginal spurring throughout the remainder of the mid foot articulations. LIGAMENTS Lisfranc: Intact Syndesmosis: Intact. Anterior talofibular (ATFL): Intact. Posterior talofibular (PTFL): Intact. Calcaneofibular (CFL): Intact. Deltoid: Intact. Spring: Intact. TENDONS Achilles: There is moderate fusiform thickening of the mid Achilles tendon consistent with tendinopathy. There is no discrete Achilles tendon tear. Medial (PTT, FDL, FHL): The posterior tibialis tendon is intact. The flexor digitorum  longus and flexor hallucis longus tendons are intact. Lateral (peroneus longus and brevis): The peroneus longus and peroneus brevis tendons are intact. Anterior (ATT, EHL, EDL): The visualized extensor tendons are intact. PLANTAR FASCIA: The visualized plantar fascia is unremarkable. TARSAL TUNNEL: Unremarkable. SINUS TARSI: Unremarkable. There is diffuse severe fatty atrophy of the musculature as well as intramuscular edema likely on the basis of a diabetic myopathy and or neuropathy.     ULTRASOUND KIDNEYS / BLADDER    Result Date: 9/11/2023  IMPRESSION: 1. Symmetric renal sizes, without hydronephrosis. Technically limited visualization of kidneys. 2. Unremarkable sonographic appearance of bladder. 3. Mild prostatic enlargement.    X-RAY FINGER RIGHT 2+ VIEWS    Result Date: 9/11/2023  IMPRESSION:  Stable distraction of the fracture fragments at the base of the third distal phalanx. COMPARISON: Right hand and finger radiographs 9/10/2023. COMMENT: AP and lateral views of the right third digit are completed post procedure. The intra-articular fracture at the base of the third distal phalanx is in stable position compared with earlier exams. There is some radiopaque material in the right renal bed, and in an overlying gauze dressing. No soft tissue gas. Normal alignment at the third DIP joint on the provided images.    X-RAY FINGER RIGHT 2+ VIEWS    Result Date: 9/11/2023  IMPRESSION:  A displaced, closed, intra-articular fracture of the base of the distal phalanx is confirmed. The middle phalanx appears intact on the provided images. COMPARISON: Right hand and wrist radiographs 9/10/2023 obtained at 1145, 1340, 1900 hours. COMMENT:  3 images of the right third digit Third finger splint removed in the interval. There is an intra-articular fracture the base of the third distal phalanx. The middle phalanx appears intact on the provided images. Normal alignment maintained at the DIP joint. Mild associated soft tissue  swelling noted.    X-RAY HAND RIGHT 2 VIEWS    Result Date: 9/10/2023  IMPRESSION:  Improved alignment of the distal third metacarpal fracture fragments post splinting. Persistent distraction of the fracture fragments at the base of the third distal phalanx noted. COMPARISON: Right hand radiographs 9/10/2023 11:47 AM. COMMENT:  Three views of the right hand are submitted. Interval splint placement third digit, with improved alignment of fracture fragments of the distal aspect of the third middle phalanx.  There is also fracture involving the base of the third distal phalanx which remains distracted approximately 2 mm.  Overlying soft tissue swelling again noted. Additional observations include generalized osseous demineralization, osteoarthritic changes in the first CMC and triscaphe joints, mild scapholunate dissociation, chondrocalcinosis at the wrist.    X-RAY SHOULDER RIGHT 2+ VIEWS    Result Date: 9/10/2023  IMPRESSION: Severe osteoarthritic changes without evidence of acute fracture malalignment as discussed below. COMMENT: Comparison: None. 3 views of the right shoulder. Large subacromial spur. Severe glenohumeral osteoarthritis. AC joint degenerative disease. Enthesophyte formation and calcification at the rotator cuff insertion. No glenohumeral dislocation. Intra-articular body formation in the axillary recess of the glenohumeral joint. No evidence of acute fracture or malalignment. Limited assessment of the right hemithorax appear grossly within normal limits.     X-RAY WRIST RIGHT 3+ VIEWS    Result Date: 9/10/2023  IMPRESSION: Demineralization, degenerative disease and other chronic findings as discussed below with no evidence of acute fracture malalignment at the right wrist. COMMENT: 4 views of the right wrist are performed and compared with 9/10/2023 x-rays of the hands and 4/21/2014. Demineralization limits assessment. Extensive atherosclerotic vascular calcifications. Chondrocalcinosis of the TFCC.  Diffuse carpal osteoarthritis greatest at the base of the thumb. Chronic mild widening of the scapholunate interval is again noted. This could be seen with scapholunate ligament tears. This is also similar to older x-rays from 4/21/2014. Intra-articular bodies in the proximal carpus suspected. No evidence of acute fracture. Peripheral IV is noted in the right hand.    X-RAY HAND RIGHT 3+ VIEWS    Result Date: 9/10/2023  IMPRESSION: Acute fracture along the dorsum of the left third finger at the level of the distal interphalangeal joint. Other, it is uncertain if this fracture fragment is emanating from the dorsal base of the distal phalanx or the distal portions of the dorsal middle phalanx or if the fracture involves both phalanges. There is evidence of overlying soft tissue injury/laceration. No evidence of radiopaque foreign bodies. Diffuse degenerative joint disease typical of osteoarthritis. Degenerative disease has progressed since 2014. Demineralization may limit assessment. Slight widening of scapholunate interval could be related to chronic scapholunate ligament tearing. No evidence of additional fracture or malalignment. Atherosclerotic vascular calcifications. COMMENT: 4 views of the right hand are performed and compared with 4/21/2014. See impression.    X-RAY CHEST 1 VIEW    Result Date: 9/10/2023  IMPRESSION: Cannot exclude mild pulmonary vascular congestion. Otherwise limited study reveals no definite acute disease. COMMENT: Comparison: Chest x-ray dated 5/22/2013. AP erect view of the chest. Body habitus and diminished lung volumes limit assessment significantly. No evidence of a pneumothorax or sizable pleural effusion. No overt pulmonary edema although vascular congestion cannot be excluded. Otherwise lungs appear grossly clear within the limitations of the study. Left pacer is noted. Cardiac silhouette appears somewhat enlarged. Atherosclerotic aorta. No gross bone or upper abdominal findings of  concern. Severe shoulder osteoarthritis bilaterally.    CT CERVICAL SPINE WITHOUT IV CONTRAST    Result Date: 9/10/2023  IMPRESSION: No evidence of acute fracture or posttraumatic subluxation. Image quality is mildly degraded by artifact mildly limiting assessment. Advanced degenerative disease possibly with severe central spinal stenosis at C6-C7 due to a disc osteophyte complex. COMMENT: Comparison: None. Technique: Noncontrast CT of the C-spine CT DOSE:  The kV and/or mA were adjusted according to the patient's size and body part for CT dose reduction. Findings: Straightened cervical lordosis. No prevertebral soft tissue swelling. Please note that assessment is slightly limited due to streak artifact from body habitus. Craniocervical junction is normally aligned. No widening atlantodental interval. Ligamentous calcifications about the dens are nonspecific although can be seen with CPPD. Small chronic appearing erosions are also noted in the base of the dens more so on the right. No subluxation. No evidence of fracture within the confines the study. Advanced degenerative disease. There may be severe central spinal stenosis at C6-7 due to disc osteophyte complex. Otherwise varying degrees of central canal and neural foraminal stenosis elsewhere. Limited assessment of central canal with no obvious epidural fluid collection. No gross findings of concern in the upper lung fields. There are atherosclerotic changes. Left pacer leads partially imaged. Limited assessment of the unenhanced soft tissues neck reveals no gross findings of concern.     CT HEAD WITHOUT IV CONTRAST    Result Date: 9/10/2023  IMPRESSION: No evidence of acute intracranial hemorrhage, mass effect or large acute territorial infarction by CT criteria.. COMMENT: Comparison: None TECHNIQUE: CT of the brain was performed without intravenous contrast. CT DOSE:  One or more dose reduction techniques (e.g. automated exposure control, adjustment of the mA  and/or kV according to patient size, use of iterative reconstruction technique) utilized for this examination. FINDINGS: Ventricles, sulci and basal cisterns: Prominent compatible with cerebral volume loss. Parenchyma: Patchy nonspecific moderate white matter disease probably microangiopathic. No evidence of acute intracranial hemorrhage. No mass effect or cerebral edema. No CT evidence of large acute territorial infarction. Extra-axial spaces: No extra-axial fluid collection. Imaged paranasal sinuses and mastoids: Clear. Calvarium: No depressed calvarial fracture. Extra calvarial structures:Atherosclerotic vascular calcifications. Mild soft tissue swelling may be present in the anterior scalp. On the  images, there is severe right greater than left osteoarthritis of the shoulders all left pacer visible.    Patient's clinical condition improved with medical management and nursing care.   Patient is medically stable for discharge.  Patient's condition at discharge- awake, alert and oriented x 3  Not in any apparent cardiorespiratory distress.     Advise to take all medications as prescribed.  Advise to call 911 or visit nearest Emergency room if you have worsening symptoms or any medical emergencies.   Advise to follow up with your primary medical doctor within 1 week of discharge.    Labs, diagnosis and further plan of care was explained to patient and the patient verbalized understanding.   Time spent 38 minutes in med review, chart review, discharge planning & discussion with patient, SW, specialists.  OUTPATIENT  FOLLOW-UP / REFERRALS / PENDING TESTS        Outpatient Follow-Up Appointments            In 2 days Summer BRENDAN [facility] to Home    In 1 week Richard Ojeda DO [facility] in [place]          Referrals  No orders of the defined types were placed in this encounter.      Test Results Pending at Discharge  Unresulted Labs (From admission,  onward)     Start     Ordered    09/12/23 0600  CBC and Differential  Daily      Question:  Release to patient  Answer:  Immediate   Start Status   09/14/23 0600 Scheduled   09/15/23 0600 Scheduled   09/16/23 0600 Scheduled   09/17/23 0600 Scheduled   09/18/23 0600 Scheduled       09/11/23 1111    09/12/23 0600  Basic metabolic panel  Daily      Question:  Release to patient  Answer:  Immediate   Start Status   09/14/23 0600 Scheduled   09/15/23 0600 Scheduled   09/16/23 0600 Scheduled   09/17/23 0600 Scheduled   09/18/23 0600 Scheduled       09/11/23 1111                Important Issues to Address in Follow-Up  Repeat BMP in 1 week and follow up with Dr. Mcmullen, mook & farxiga remain on hold  -Follow-up outpatient within 1 week with Dr. Solis for right finger injury  Check blood glucose twice a day, F/u with Dr. Nettles in 1 week for diabetes mgt, if hypoglycemia event call Dr. Nettles's office      DISCHARGE DISPOSITION AND DESTINATION      Disposition: Home   Destination: Home                            Code Status At Discharge: Full Code    Physician Order for Life-Sustaining Treatment Document Status      No documents found

## 2023-09-13 NOTE — PROGRESS NOTES
Subjective:   Patient seen at bedside for continued management of lateral heel wound at the left foot. No events overnight, NAD. Denies any N/V/F/C/CP/SOB.       Past Medical/Surgical/Family/Social history reviewed in detail as charted  Allergies and Medications reviewed in detail as charted        Review of Systems:  Head and Neck: No complaints  Chest: No complaints  Abdomen: No Complaints  Constitutional: Unremarkable       Objective:      Vitals: have reviewed the patient's pertinent vital signs.   Vitals:    09/13/23 0700   BP: 128/88   Pulse: 62   Resp: 16   Temp: (!) 36 °C (96.8 °F)   SpO2: 97%       Radiology: Reviewed    Labs:     CBC Results       09/13/23 09/12/23 09/11/23     0858 0434 0013    WBC 4.85 7.39 19.80    RBC 3.84 3.81 3.91    HGB 11.7 11.7 12.0    HCT 36.1 35.7 36.5    MCV 94.0 93.7 93.4    MCH 30.5 30.7 30.7    MCHC 32.4 32.8 32.9     119 144         BMP Results       09/13/23 09/12/23 09/11/23     0858 0434 0013     137 137    K 4.0 3.8 4.6    Cl 103 100 98    CO2 27 28 29    Glucose 116 123 201    BUN 35 40 31    Creatinine 2.0 2.5 2.8    Calcium 8.4 8.4 8.9    Anion Gap 8 9 10    EGFR 32.7 25.3 22.2              Lower Extremity Physical Exam:     -Vascular: DP/PT pulses are nonpalpable. B/L edema to the foot, ankle and leg. Capillary refill time is less than 3 seconds B/L.  Skin temperature is warm to warm from leg to toes B/L.               -Ortho: + active df/pf of ankle. MMT 5/5 in all planes tested. No pain noted in the posterior calf upon palpation.   -Neuro:Light touch and protective sensation is diminished  -Derm: There are no open lesions B/L.  Moderate BLE edema              RLE: There are no open wounds or lesions.              LLE: Wound at the lateral heel.  Wound base is granular.  Wound edges are intact.  There is periwound erythema and maceration noted.  There is mild sanguinous drainage noted.  There is no lymphangitic streaking noted.  There is no malodor  noted.  Wound does not probe to bone.  There is no clinical signs of infection noted.          Assessment: 73 y.o. male being seen at bedside for continued management of left foot lateral heel wound without concern for infection        Plan:  -Patient was seen and examined with all questions and concerns addressed.  -Patient was dressed with Santyl, Adaptic, DSD, ABD, and Viki. Dressings will be changed while in house freq: MWF  -Patient may weight bear to B/L LE.   -Rest of care per primary team  --DVT prophylaxsis and pain control per primary team.  -Patient is to follow-up with Dr. De Souza at [place] [facility] upon discharge.  -Please contact the on call podiatry resident with any questions or concerns        Flory Robins DPM,  PGY-2  Pager #[phone]

## 2023-09-13 NOTE — HOSPITAL COURSE
Mitchell is a 73 y.o. male admitted on 9/10/2023 with Cardiac pacemaker [Z95.0]  DAXA (acute kidney injury) (CMS/[facility]) [N17.9]  Chronic systolic congestive heart failure (CMS/[facility]) [I50.22]  Open nondisplaced fracture of distal phalanx of right middle finger, initial encounter [S62.662B]  Fall, initial encounter [W19.XXXA]. Principal problem is DAXA (acute kidney injury) (CMS/[facility]).    Past Medical History  Mitchell has a past medical history of Arthritis, GERD (gastroesophageal reflux disease), Hypertension, Lipid disorder, Neuropathy, Persistent atrial fibrillation (CMS/[facility]) (03/19/2019), Primary osteoarthritis involving multiple joints (05/15/2019), Pulmonary embolism (CMS/[facility]), Type 2 diabetes mellitus (CMS/[facility]), Type 2 diabetes mellitus with hypoglycemia (CMS/[facility]) (10/24/2013), and Wound healing, delayed.    History of Present Illness   1. L foot wound infection - recent admission at OSH in July and completion of 3 week course of amox-clav for wound cx growth of MSSA, Strep anginosus, Enterococcus, Candida at that time. MRI on independent review now shows no evidence of osteomyelitis and pt remains afebrile without leukocytosis.     2. Leukocytosis - due to #1 and resolved now     3. DM - mgmt per Primary team

## 2023-09-13 NOTE — PLAN OF CARE
Problem: Self-Care Deficit  Goal: Improved Ability to Complete Activities of Daily Living  9/13/2023 1232 by Magy Conde, OT  Outcome: Met     Problem: Adult Inpatient Plan of Care  Goal: Plan of Care Review  9/13/2023 1232 by Magy Conde, OT  Outcome: Progressing

## 2023-09-13 NOTE — DISCHARGE INSTRUCTIONS
Follow up with Dr. Kemi Calderon in 1 week to follow up on BMP on your chronic kidney disease and resumption of bumex  Check blood glucose twice a day, F/u with Dr. Nettles in 1 week for diabetes mgt  Plan is to Maintain splint until follow-up with orthopedics, non weight bearing right hand  Encourage right wrist and elbow motion to avoid stiffness  -Follow-up outpatient within 1 week with Dr. Solis

## 2023-09-13 NOTE — CONSULTS
REPORT TYPE: Consult Note    DATE OF CONSULTATION: 09/13/2023    ENDOCRINOLOGY CONSULTATION    HISTORY OF PRESENT ILLNESS:  Mr. Sauceda is a 73-year-old male with a 25-year history of type 2 diabetes mellitus.  The patient had had an episode of hypoglycemia earlier this year and was admitted to [facility] in May.  At that time,   GLIMEPIRIDE was discontinued and his standing dose of insulin, HUMALOG 75/25 was reduced from 25 units to 10 units twice daily.  Also at that time, his creatinine was 1.26; however, during the hospitalization, it went up as high as 2.5.  The patient was   in his usual state of health until he suffered a fall at home and was brought to [facility] and admitted with a hand injury and possible head injury.  The patient had an episode of hypoglycemia this morning and we are consulted for management   recommendations.  The patient is on FARXIGA 10 mg daily and OZEMPIC 1 mg weekly.  His present laboratory studies include sodium 138, potassium 4.0, chloride 103, CO2 of 27, creatinine 2.0, BUN 35, calcium 8.4. Hemoglobin 11.7, hematocrit 36.1, white   count 4.85.  Urine with 1+ protein.  AST 17, ALT 13, total protein 8.2, pH 7.36, pCO2 of 54, pO2 of 43 on a venous blood sample.  An ultrasound of the kidneys reveals normal sized kidneys.  The patient does have target organ damage from diabetes,   including neuropathy.    PAST MEDICAL HISTORY, SOCIAL HISTORY, FAMILY HISTORY, REVIEW OF SYSTEMS, MEDICATIONS AND ALLERGIES:  See H and P.    PHYSICAL EXAMINATION:  VITAL SIGNS:  BMI is 40.96.  Temperature is 98, pulse 60, blood pressure 120/58.  GENERAL:  The patient is in no acute distress.  SKIN:  Shows some photo damage with several sun related lesions.  HEENT:  Without inflammation.  He is anicteric.  Teeth appear to be in fair repair.  NECK:  Thyroid is not visible.  HEART:  Regular by monitor.  LUNGS:  He is not tachypneic.  ABDOMEN:  Centripetally obese.  EXTREMITIES:  Appear to  be edematous.  They are wrapped.  NEUROLOGIC:  Awake, alert, oriented.  No focal neurologic deficits.    IMPRESSION:  Type 2 diabetes mellitus with hypoglycemia, most likely a combination of dehydration, chronic kidney disease and the use of a sulfonylurea.  Comorbidities including hypertension, hyperlipidemia, diabetic leg ulcer, morbid obesity, sleep   apnea.    RECOMMENDATIONS:  Given that this is the patient's second episode of hypoglycemia.  I believe it is best to discontinue GLIMEPIRIDE, which has been on hold for the last 24 hours here at [place].  I would also reduce the patient's HUMALOG 75/25 insulin to   20 units twice daily.  The patient will follow up with his endocrinologist as an outpatient.      Wilson Painting MD    DD: 09/13/2023 15:30  DT: 09/13/2023 15:57  Voice ID: [id number]/Report ID: [id number]  am

## 2023-09-13 NOTE — ASSESSMENT & PLAN NOTE
Unstageable pressure injury to left heel  and pressure injury to right and left buttock Stage 1 established and present on admission  Encourage ambulation, skin care, wound care per protocol  PT/OT

## 2023-09-14 ENCOUNTER — PATIENT OUTREACH (OUTPATIENT)
Dept: CASE MANAGEMENT | Facility: CLINIC | Age: 73
End: 2023-09-14
Payer: MEDICARE

## 2023-09-14 ASSESSMENT — ENCOUNTER SYMPTOMS
ACTIVITY CHANGE: 1
FATIGUE: 1
ARTHRALGIAS: 1
WOUND: 1
RESPIRATORY NEGATIVE: 1

## 2023-09-14 NOTE — PROGRESS NOTES
NAME: Mitchell Sauceda    MRN: [id number]    YOB: 1950    Event Review:    Initial TCM Patient Outreach Date: 09/14/23    Assessment completed with: Spouse or Significant Other     Discharge Diagnosis: DAXA    Patient readmitted in the last 30 days: No  Discharging Facility: [facility]  Date of Last Admission: 09/10/23  Date of Last Discharge: 09/13/23    Discharging Facilty SNF/Rehab: no        Patient's perception of their health status since discharge: Improving    HPI:  Spoke with spouse today regarding patients hospitalization.  EMS brought patient to [facility] post fall at home.  Xray revealed fracture of right middle finger. Not surgical candidate. Splint was placed to finger.  Patient was gently hydrated during the course of the hospitalization for DAXA.  BUN 27, Creatinine 2.5 on admission and BUN 35 and Creatinine 2.0 on discharge  Antibiotics provided in hospital for left heel wound. Patient discharged on Zyvox. Wound care comes to home once a week.  Glucose is stable per spouse.  They do not have a scale at home.  Wife states she will purchase today .  Reviewed the importance of daily weight monitoring for management of CHF.  CHF care plan initiated.  Wife will be calling nephrology for hospital follow up appointment.  Spouse uses electric wheelchair for mobility.  Hospital follow up with Dr. Ojeda on 9/25.    Review of Systems   Constitutional: Positive for activity change and fatigue.   Respiratory: Negative.    Cardiovascular: Positive for leg swelling (improving).   Musculoskeletal: Positive for arthralgias.        Right middle finger fracture   Skin: Positive for wound (left heel).       Medication Review:    Medication Review: Yes     Reported by: Spouse  Any new medications prescribed at discharge?: Yes  Is the patient having any side effects they believe may be caused by any medication additions or changes?: No  New prescriptions filled?: Yes     Do you have enough of  your regularly prescribed medications?: No  If No, please explain:  (needs refill of Ozempic)    Medication adherence problem?: No  Was a medication discrepancy indentified?: No       Nursing Interventions: No intervention needed  Reconciled the current and discharge medications: Yes  Reviewed AVS (Discharge Instructions)?: Yes       Acute Pain:       Chronic Pain:    Chronic pain: Yes        Chronic pain timing: intermittent       Diet/Nutrition:    Type of Diet: Cardiac 2mg sodium, Diabetic  Diet Adherence: Adherent with diet        Home Care Services:          Home Care Interventions: No intervention required     Post-Discharge Durable Medical Equipment::    Durable Medical Equipment: Front wheeled walker, Electric wheelchair  Does patient's condition require a scale?: Yes  Working scale at home?: No  Oxygen Use: No        DME Interventions: No intervention required    Home Management:    Living Arrangement: Spouse  Support System:: Spouse, Friends  Type of Residence: 2 [place] house  Home Monitoring: Blood Sugars  Any patient reported falls in the last 3 months?: Yes  Advent or spiritual beliefs that impact treatment?: No    Appointment Scheduling:    PCP appointment scheduled: Yes  TCM Appointment Type: Hospital Follow-Up  Appointment Date: 09/25/23     Appointment Provider: Dr. Ojeda        Follow-Ups:     Relevant Labs & Tests: WBC 17.74 ,  , BUN 27, Cr 2.5, GFR 25.3  Relevant Specialist Follow-ups: Dr. Mcmullen    Interventions/ Care Coordination:    Interventions/ Care Coordination: Encouraged patient to call PCP/Specialist  General Education: CHF, Diabetes, Falls/Home safety       Reviewed signs/symptoms of worsening condition or complication that necessitate a call to the Physician's office.  Educated patient on access to care.  RN phone number given for future care management.    Yecenia Bess MSN, RN  Ambulatory Care Manager  [phone]

## 2023-09-15 ENCOUNTER — TELEMEDICINE (OUTPATIENT)
Dept: HOSPITALIST | Facility: CLINIC | Age: 73
End: 2023-09-15
Payer: MEDICARE

## 2023-09-15 DIAGNOSIS — S62.91XA CLOSED FRACTURE OF RIGHT HAND, INITIAL ENCOUNTER: ICD-10-CM

## 2023-09-15 DIAGNOSIS — I50.22 CHRONIC SYSTOLIC CHF (CONGESTIVE HEART FAILURE) (CMS/HCC): ICD-10-CM

## 2023-09-15 DIAGNOSIS — L08.9 WOUND INFECTION: ICD-10-CM

## 2023-09-15 DIAGNOSIS — N17.9 ACUTE KIDNEY INJURY SUPERIMPOSED ON CKD (CMS/HCC)  (CMS/HCC): Primary | ICD-10-CM

## 2023-09-15 DIAGNOSIS — T14.8XXA WOUND INFECTION: ICD-10-CM

## 2023-09-15 DIAGNOSIS — N18.9 ACUTE KIDNEY INJURY SUPERIMPOSED ON CKD (CMS/HCC)  (CMS/HCC): Primary | ICD-10-CM
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
## 2023-09-15 LAB
BACTERIA BLD CULT: NORMAL
BACTERIA BLD CULT: NORMAL

## 2023-09-15 NOTE — PATIENT INSTRUCTIONS
Follow up with your PCP, Dr. Ojeda, at your appointment on 9/25/23.    Follow up with nephrology, Dr. Mcmullen, at your appointment on 10/23/23.    Follow up with endocrinology, Dr. Nettles, to schedule a follow up appointment.    Follow up with orthopedics, Dr. Solis, to schedule a follow up appointment.    Follow up with your podiatrist, Dr. Sotelo, at your appointment on 9/19/23.    Obtain repeat labs (BMP) on/around 9/20/23 and follow up with your doctors to review the results.    Monitor for signs and symptoms of possible infection: fever, change in mental status, chills, rigors, and return to the hospital or contact your doctor if you experience these.    Monitor your foot wound for signs/symptoms of infection and contact your provider if you develop: increased pain, redness, warmth, drainage, or fevers.     Please follow CHF protocol and weigh yourself daily (same time, after your first morning void) and keep a record of your weights to discuss with your doctors.  Please contact your doctor if you gain more than 2-3 lbs in 1 day or more than 5 lbs in 1 week.  Please follow a heart healthy, low fat, low sodium, carb controlled, diabetic diet with a fluid restriction if indicated by your doctor (generally less than 2 liters per day).    Weight on discharge from the hospital (9/13/23): 302 lbs  They do not currently have a scale at home    Return to the hospital for new/persistent/severe shortness of breath or chest pain.    Monitor your blood sugars before meals and at bedtime and keep a log to review with your doctors.  Contact your doctor if your readings are consistently <70 or >300.    It was a pleasure participating in your care today!

## 2023-09-15 NOTE — PROGRESS NOTES
Hospital to Home Program Visit       Hospital to Home Program Visit:  Audio Only Encounter       DISCHARGE INFORMATION   Assessment completed with: Spouse or Significant Other (Wife: Renu with patient contributing in background)  Discharge Diagnosis: Fall, R hand fracture, L foot wound infection, DAXA on CKD IIIb, chronic systolic CHF  Discharging Facility: [facility]  Date of Last Discharge: 09/13/23  Discharge Disposition: Home  Patient's perception of their health status since discharge: Returned to baseline/stable (Denies R hand pain, L foot wound.)       PCP: Richard Ojeda DO       HPI / OVERVIEW OF VISIT      Mitchell Sauceda is a 73 y.o. male with a past medical history of HTN, HLD, GERD, DM2, CKD IIIb, afib (on xarelto), sleep apnea (CPAP), DVT/PE (on xarelto), and systolic CHF who was admitted to [facility] 9/10-9/13/23 with a fall. He presented after a fall when getting out of bed and sustained a R hand fracture and orthopedics performed a closed reduction and splinting with recommendations for outpatient follow up. He was also found to have a L foot wound infection and was started on IV antibiotics and was transitioned to oral linezolid to complete a 14 day course of antibiotics on discharge. He had an DAXA/CKD with a peak creatinine 2.8, which improved to 2.0 on discharge and his bumex and farxiga were held and his diabetic regimen was modified for hypoglycemia.     Of note, he was recent admitted to [facility] 7/8-7/16/23 with sepsis 2/2 a heel wound and [facility] 5/17-5/21/23 with a CHF exacerbation.    I spoke with his wife, Renu, for an [facility] visit for an DAXA/CKD and history of CHF. She reports he is essentially back to his baseline and denies any R hand pain or signs of infection of his L foot wound. He has follow up appointments scheduled with his PCP, cardiologist, and nephrology, but has not yet scheduled with endocrinology or orthopedics. I ordered repeat labs (BMP) to be drawn by [facility] and  "e-faxed the orders for the labs to be drawn at a home care visit approximately one week after discharge.    We reviewed his medications, which he is taking as prescribed. She reports his blood sugars have been \"excellent.\" He had been checking his weight daily, but their scale broke about a week ago and his wife is planning on going out to buy a new one today. She reports he drinks \"a lot of water\" and we discussed his recommended fluid restriction, but she denies excessive salt intake and we reviewed foods to avoid due to their high salt content. We reviewed to monitor for signs and symptoms of infection of his foot and when to return to the hospital versus contact his doctors with a concern or change. She offers no further questions or concerns today regarding her 's care.    Weight on discharge from the hospital (9/13/23): 302 lbs  They do not currently have a scale at home    Endocrinologist = Dr. Nettles  Cardiologist = Dr. Quintana  Podiatrist = Dr. Sotelo    What would cause you to go back to the hospital?   \"Falling out of bed.\"     Who would you call if you had a problem?  \"Dr. Nettles, Dr. Ojeda.\"     Overall, how was your care that was provided and do you have any suggestions for improvement?  Above Average - \"He liked it. He was very friendly with everyone there and wanted me to meet them all.\"     PROBLEMS      Patient Active Problem List   Diagnosis    Neuropathy    Mixed hyperlipidemia    Hiatal hernia    Gastroesophageal reflux disease    Edema    Diabetic retinopathy (CMS/HCC)    Cataracts, bilateral    Benign essential hypertension    Chronic fatigue    Diabetes mellitus (CMS/HCC)    Anemia    Chronic pain    Morbid obesity (CMS/HCC)    Sleep apnea    Persistent atrial fibrillation (CMS/HCC)    Ambulatory dysfunction    Primary osteoarthritis involving multiple joints    Cardiac pacemaker    Chronic edema    Chronotropic incompetence with sinus node dysfunction (CMS/HCC)    " Family history of hypercoagulable state    History of DVT (deep vein thrombosis)    History of pulmonary embolism    Hypokalemia    Shortness of breath    Chronic systolic congestive heart failure (CMS/[facility])    Diabetic ulcer of left heel associated with type 2 diabetes mellitus (CMS/[facility])    Chronic kidney disease, stage 4 (severe) (CMS/[facility])    Long-term insulin use (CMS/[facility])    DAXA (acute kidney injury) (CMS/[facility])    Elevated troponin I level    Fall    Diabetic leg ulcer (CMS/[facility])    Fracture of right hand    Pressure injury of buttock, stage 1     MEDICATIONS      Reconciled the current and discharge medications: Yes      Current Outpatient Medications:     ascorbic acid (VITAMIN C) 500 mg tablet, Take 1 tablet by mouth daily., Disp: , Rfl:     atorvastatin (LIPITOR) 20 mg tablet, Take 1 tablet (20 mg total) by mouth daily., Disp: 90 tablet, Rfl: 3    collagenase (SANTYL) ointment, Apply 1 Application topically daily., Disp: 30 g, Rfl: 0    cyanocobalamin (VITAMIN B12) 1,000 mcg tablet, Take 1 tablet (1,000 mcg total) by mouth daily., Disp: 30 tablet, Rfl: 0    flash glucose scanning reader (FREESTYLE BURT 2 READER) [facility], Check BG as directed. Change every 2 weeks. DX: Z79.4, E 13.40, Disp: 1 each, Rfl: 0    flash glucose sensor (FREESTYLE BURT 2 SENSOR) kit, Check BG as directed DX: Z79.4, E 13.40, Disp: 6 kit, Rfl: 1    gabapentin (NEURONTIN) 300 mg capsule, TAKE 1 CAPSULE BY MOUTH TWICE A DAY, Disp: 180 capsule, Rfl: 0    insulin lispro protamine-lispro, 75/25, (HumaLOG Mix 75-25 KwikPen) 100 unit/mL (75-25) pen, Inject 20 Units under the skin 2 (two) times a day., Disp: 12 mL, Rfl: 0    KLOR-CON M20 20 mEq CR tablet, TAKE 1 TABLET BY MOUTH 2 TIMES A DAY., Disp: 180 tablet, Rfl: 0    lancets misc, for blood glucose monitoring 3x day, Disp: , Rfl:     linezolid (ZYVOX) 600 mg tablet, Take 1 tablet (600 mg total) by mouth 2 (two) times a day for 11 days Indications: a bacterial skin  infection., Disp: 22 tablet, Rfl: 0    lisinopril (PRINIVIL) 2.5 mg tablet, Take 1 tablet (2.5 mg total) by mouth daily., Disp: 90 tablet, Rfl: 3    pantoprazole (PROTONIX) 40 mg EC tablet, Take 1 tablet (40 mg total) by mouth daily., Disp: 90 tablet, Rfl: 0    PEN NEEDLE, DIABETIC (BD ULTRA-FINE MICRO PEN NEEDLE MISC), once daily use with Levemir, Disp: , Rfl:     rivaroxaban (XARELTO) 20 mg tablet, Take 1 tablet (20 mg total) by mouth daily with dinner., Disp: 90 tablet, Rfl: 3    semaglutide (OZEMPIC) 1 mg/dose (2 mg/1.5 mL) subcutaneous injection, Inject 1 mg under the skin every (seven) 7 days., Disp: , Rfl:     I am having Mitchell DAILEYNorman Sohail maintain his (PEN NEEDLE, DIABETIC (BD ULTRA-FINE MICRO PEN NEEDLE MISC)), lancets, ascorbic acid, rivaroxaban, lisinopriL, atorvastatin, KLOR-CON M20, semaglutide, FREESTYLE BURT 2 SENSOR, FREESTYLE BUTR 2 READER, pantoprazole, gabapentin, linezolid, collagenase, cyanocobalamin, and insulin lispro protamine-lispro (75/25).    REVIEW OF SYSTEMS      All other systems reviewed and negative except as noted in HPI.      POST DISCHARGE MEDICAL EQUIPMENT      DME Interventions: No intervention required  Oxygen Use: No     Has all Durable Medical Equipment (DME) been delivered?: Other (N/A)    FOLLOW-UP APPOINTMENTS      Appointment Provider: Dr. Richard Ojeda  Appointment Date: 09/25/23    Appointment Provider: Dr. Kvng Mcmullen  Appointment Date: 10/23/23    Appointment Provider: Dr. Lisandro Nettles  Appointment Date: not yet scheduled    Appointment Provider: Dr. Basil Solis  Appointment Date: not yet scheduled    Appointment Provider: Dr. Sean Sotelo  Appointment Date: 9/19/23    INTERVENTIONS   Interventions needed: ordered labs            PATIENT INSTRUCTIONS      Patient Instructions   Follow up with your PCP, Dr. Ojeda, at your appointment on 9/25/23.    Follow up with nephrology, Dr. Mcmullen, at your appointment on 10/23/23.    Follow up with endocrinology,  Dr. Nettles, to schedule a follow up appointment.    Follow up with orthopedics, Dr. Solis, to schedule a follow up appointment.    Follow up with your podiatrist, Dr. Sotelo, at your appointment on 9/19/23.    Obtain repeat labs (BMP) on/around 9/20/23 and follow up with your doctors to review the results.    Monitor for signs and symptoms of possible infection: fever, change in mental status, chills, rigors, and return to the hospital or contact your doctor if you experience these.    Monitor your foot wound for signs/symptoms of infection and contact your provider if you develop: increased pain, redness, warmth, drainage, or fevers.     Please follow CHF protocol and weigh yourself daily (same time, after your first morning void) and keep a record of your weights to discuss with your doctors.  Please contact your doctor if you gain more than 2-3 lbs in 1 day or more than 5 lbs in 1 week.  Please follow a heart healthy, low fat, low sodium, carb controlled, diabetic diet with a fluid restriction if indicated by your doctor (generally less than 2 liters per day).    Weight on discharge from the hospital (9/13/23): 302 lbs  They do not currently have a scale at home    Return to the hospital for new/persistent/severe shortness of breath or chest pain.    Monitor your blood sugars before meals and at bedtime and keep a log to review with your doctors.  Contact your doctor if your readings are consistently <70 or >300.    It was a pleasure participating in your care today!         BRENDAN Yang  9/15/2023

## 2023-09-15 NOTE — LETTER
Mitchell Sauceda    I spoke with this patient's wife today for an H2H visit for an DAXA/CKD and history of CHF. He has follow up appointments scheduled with his PCP, cardiologist, and nephrology, but has not yet scheduled with endocrinology or orthopedics. I ordered repeat labs to be drawn by home care sometime next week.     Please let me know if I can be of any further assistance in this patient's care.     Sincerely yours,   BRENDAN Yang         Hospital to Home Program Visit       Hospital to Home Program Visit:  Audio Only Encounter       DISCHARGE INFORMATION   Assessment completed with: Spouse or Significant Other (Wife: Renu with patient contributing in background)  Discharge Diagnosis: Fall, R hand fracture, L foot wound infection, DAXA on CKD IIIb, chronic systolic CHF  Discharging Facility: [facility]  Date of Last Discharge: 09/13/23  Discharge Disposition: Home  Patient's perception of their health status since discharge: Returned to baseline/stable (Denies R hand pain, L foot wound.)       PCP: Richard Ojeda DO       HPI / OVERVIEW OF VISIT      Mitchell Sauceda is a 73 y.o. male with a past medical history of HTN, HLD, GERD, DM2, CKD IIIb, afib (on xarelto), sleep apnea (CPAP), DVT/PE (on xarelto), and systolic CHF who was admitted to [facility] 9/10-9/13/23 with a fall. He presented after a fall when getting out of bed and sustained a R hand fracture and orthopedics performed a closed reduction and splinting with recommendations for outpatient follow up. He was also found to have a L foot wound infection and was started on IV antibiotics and was transitioned to oral linezolid to complete a 14 day course of antibiotics on discharge. He had an DAXA/CKD with a peak creatinine 2.8, which improved to 2.0 on discharge and his bumex and farxiga were held and his diabetic regimen was modified for hypoglycemia.     Of note, he was recent admitted to [facility] 7/8-7/16/23 with sepsis 2/2 a heel  "wound and [facility] 5/17-5/21/23 with a CHF exacerbation.    I spoke with his wife, Renu, for an [facility] visit for an DAXA/CKD and history of CHF. She reports he is essentially back to his baseline and denies any R hand pain or signs of infection of his L foot wound. He has follow up appointments scheduled with his PCP, cardiologist, and nephrology, but has not yet scheduled with endocrinology or orthopedics. I ordered repeat labs (BMP) to be drawn by [facility] and e-faxed the orders for the labs to be drawn at a home care visit approximately one week after discharge.    We reviewed his medications, which he is taking as prescribed. She reports his blood sugars have been \"excellent.\" He had been checking his weight daily, but their scale broke about a week ago and his wife is planning on going out to buy a new one today. She reports he drinks \"a lot of water\" and we discussed his recommended fluid restriction, but she denies excessive salt intake and we reviewed foods to avoid due to their high salt content. We reviewed to monitor for signs and symptoms of infection of his foot and when to return to the hospital versus contact his doctors with a concern or change. She offers no further questions or concerns today regarding her 's care.    Weight on discharge from the hospital (9/13/23): 302 lbs  They do not currently have a scale at home    Endocrinologist = Dr. Nettles  Cardiologist = Dr. Quintana  Podiatrist = Dr. Sotelo    What would cause you to go back to the hospital?   \"Falling out of bed.\"     Who would you call if you had a problem?  \"Dr. Nettles, Dr. Ojeda.\"     Overall, how was your care that was provided and do you have any suggestions for improvement?  Above Average - \"He liked it. He was very friendly with everyone there and wanted me to meet them all.\"     PROBLEMS      Patient Active Problem List   Diagnosis    Neuropathy    Mixed hyperlipidemia    Hiatal hernia    Gastroesophageal reflux disease    Edema    " Diabetic retinopathy (CMS/[facility])    Cataracts, bilateral    Benign essential hypertension    Chronic fatigue    Diabetes mellitus (CMS/[facility])    Anemia    Chronic pain    Morbid obesity (CMS/[facility])    Sleep apnea    Persistent atrial fibrillation (CMS/[facility])    Ambulatory dysfunction    Primary osteoarthritis involving multiple joints    Cardiac pacemaker    Chronic edema    Chronotropic incompetence with sinus node dysfunction (CMS/[facility])    Family history of hypercoagulable state    History of DVT (deep vein thrombosis)    History of pulmonary embolism    Hypokalemia    Shortness of breath    Chronic systolic congestive heart failure (CMS/[facility])    Diabetic ulcer of left heel associated with type 2 diabetes mellitus (CMS/[facility])    Chronic kidney disease, stage 4 (severe) (CMS/[facility])    Long-term insulin use (CMS/[facility])    DAXA (acute kidney injury) (CMS/[facility])    Elevated troponin I level    Fall    Diabetic leg ulcer (CMS/[facility])    Fracture of right hand    Pressure injury of buttock, stage 1     MEDICATIONS      Reconciled the current and discharge medications: Yes      Current Outpatient Medications:     ascorbic acid (VITAMIN C) 500 mg tablet, Take 1 tablet by mouth daily., Disp: , Rfl:     atorvastatin (LIPITOR) 20 mg tablet, Take 1 tablet (20 mg total) by mouth daily., Disp: 90 tablet, Rfl: 3    collagenase (SANTYL) ointment, Apply 1 Application topically daily., Disp: 30 g, Rfl: 0    cyanocobalamin (VITAMIN B12) 1,000 mcg tablet, Take 1 tablet (1,000 mcg total) by mouth daily., Disp: 30 tablet, Rfl: 0    flash glucose scanning reader (FREESTYLE BURT 2 READER) misc, Check BG as directed. Change every 2 weeks. DX: Z79.4, E 13.40, Disp: 1 each, Rfl: 0    flash glucose sensor (FREESTYLE BURT 2 SENSOR) kit, Check BG as directed DX: Z79.4, E 13.40, Disp: 6 kit, Rfl: 1    gabapentin (NEURONTIN) 300 mg capsule, TAKE 1 CAPSULE BY MOUTH TWICE A DAY, Disp: 180 capsule, Rfl: 0    insulin lispro protamine-lispro, 75/25, (HumaLOG Mix 75-25  KwikPen) 100 unit/mL (75-25) pen, Inject 20 Units under the skin 2 (two) times a day., Disp: 12 mL, Rfl: 0    KLOR-CON M20 20 mEq CR tablet, TAKE 1 TABLET BY MOUTH 2 TIMES A DAY., Disp: 180 tablet, Rfl: 0    lancets misc, for blood glucose monitoring 3x day, Disp: , Rfl:     linezolid (ZYVOX) 600 mg tablet, Take 1 tablet (600 mg total) by mouth 2 (two) times a day for 11 days Indications: a bacterial skin infection., Disp: 22 tablet, Rfl: 0    lisinopril (PRINIVIL) 2.5 mg tablet, Take 1 tablet (2.5 mg total) by mouth daily., Disp: 90 tablet, Rfl: 3    pantoprazole (PROTONIX) 40 mg EC tablet, Take 1 tablet (40 mg total) by mouth daily., Disp: 90 tablet, Rfl: 0    PEN NEEDLE, DIABETIC (BD ULTRA-FINE MICRO PEN NEEDLE MISC), once daily use with Levemir, Disp: , Rfl:     rivaroxaban (XARELTO) 20 mg tablet, Take 1 tablet (20 mg total) by mouth daily with dinner., Disp: 90 tablet, Rfl: 3    semaglutide (OZEMPIC) 1 mg/dose (2 mg/1.5 mL) subcutaneous injection, Inject 1 mg under the skin every (seven) 7 days., Disp: , Rfl:     I am having Mitchell CHANG Sauceda maintain his (PEN NEEDLE, DIABETIC (BD ULTRA-FINE MICRO PEN NEEDLE MISC)), lancets, ascorbic acid, rivaroxaban, lisinopriL, atorvastatin, KLOR-CON M20, semaglutide, FREESTYLE BURT 2 SENSOR, FREESTYLE BURT 2 READER, pantoprazole, gabapentin, linezolid, collagenase, cyanocobalamin, and insulin lispro protamine-lispro (75/25).    REVIEW OF SYSTEMS      All other systems reviewed and negative except as noted in HPI.      POST DISCHARGE MEDICAL EQUIPMENT      DME Interventions: No intervention required  Oxygen Use: No     Has all Durable Medical Equipment (DME) been delivered?: Other (N/A)    FOLLOW-UP APPOINTMENTS      Appointment Provider: Dr. Richard Ojeda  Appointment Date: 09/25/23    Appointment Provider: Dr. Kvng Mcmullen  Appointment Date: 10/23/23    Appointment Provider: Dr. Lisandro Nettles  Appointment Date: not yet scheduled    Appointment Provider: Dr. Gracia  Irma  Appointment Date: not yet scheduled    Appointment Provider: Dr. Sean Sotelo  Appointment Date: 9/19/23    INTERVENTIONS   Interventions needed: ordered labs            PATIENT INSTRUCTIONS      Patient Instructions   Follow up with your PCP, Dr. Ojeda, at your appointment on 9/25/23.    Follow up with nephrology, Dr. Mcmullen, at your appointment on 10/23/23.    Follow up with endocrinology, Dr. Nettles, to schedule a follow up appointment.    Follow up with orthopedics, Dr. Solis, to schedule a follow up appointment.    Follow up with your podiatrist, Dr. Sotelo, at your appointment on 9/19/23.    Obtain repeat labs (BMP) on/around 9/20/23 and follow up with your doctors to review the results.    Monitor for signs and symptoms of possible infection: fever, change in mental status, chills, rigors, and return to the hospital or contact your doctor if you experience these.    Monitor your foot wound for signs/symptoms of infection and contact your provider if you develop: increased pain, redness, warmth, drainage, or fevers.     Please follow CHF protocol and weigh yourself daily (same time, after your first morning void) and keep a record of your weights to discuss with your doctors.  Please contact your doctor if you gain more than 2-3 lbs in 1 day or more than 5 lbs in 1 week.  Please follow a heart healthy, low fat, low sodium, carb controlled, diabetic diet with a fluid restriction if indicated by your doctor (generally less than 2 liters per day).    Weight on discharge from the hospital (9/13/23): 302 lbs  They do not currently have a scale at home    Return to the hospital for new/persistent/severe shortness of breath or chest pain.    Monitor your blood sugars before meals and at bedtime and keep a log to review with your doctors.  Contact your doctor if your readings are consistently <70 or >300.    It was a pleasure participating in your care today!         BRENDAN Yang  9/15/2023

## 2023-09-15 NOTE — Clinical Note
I spoke with this patient's wife today for an H2H visit for an DAXA/CKD and history of CHF. He has follow up appointments scheduled with his PCP, cardiologist, and nephrology, but has not yet scheduled with endocrinology or orthopedics. I ordered repeat labs to be drawn by home care sometime next week.  Please let me know if I can be of any further assistance in this patient's care.   Sincerely yours, BRENDAN Yang

## 2023-09-18 ENCOUNTER — PATIENT OUTREACH (OUTPATIENT)
Dept: CASE MANAGEMENT | Facility: CLINIC | Age: 73
End: 2023-09-18
Payer: MEDICARE

## 2023-09-18 ENCOUNTER — TELEPHONE (OUTPATIENT)
Dept: HOSPITALIST | Facility: CLINIC | Age: 73
End: 2023-09-18
Payer: MEDICARE

## 2023-09-18 NOTE — PROGRESS NOTES
ACM reached to patient for clarification of message and provide assistance.    Spoke with [facility] and established what visits are scheduled for patient this week.    Lizy from [facility] nursing is coming to house today.   OT is arriving tomorrow and PT most likely Thursday.  Patient does have an appointment with Dr. Mejia tomorrow.      Spouse did buy a scale but does not have it working. She will ask the visiting nurse to assess for her.    Yecenia Bess MSN, RN  Ambulatory Care Manager  [phone]

## 2023-09-19 NOTE — TELEPHONE ENCOUNTER
Hospital to Home Program Visit     Received voicemail from patient's wife re: scheduling a visit, which was likely intended for a different provider. She left me a voicemail yesterday (Sunday) with details about her scheduling commitments and was returning a call about scheduling a visit with someone.    I forwarded the information to Yecenia Bess RN re: following up with patient's wife for possibly an [facility] visit?    She was able to speak with patient's wife who [facility].     BRENDAN Yang

## 2023-09-20 ENCOUNTER — TELEPHONE (OUTPATIENT)
Dept: FAMILY MEDICINE | Facility: CLINIC | Age: 73
End: 2023-09-20
Payer: MEDICARE

## 2023-09-20 NOTE — TELEPHONE ENCOUNTER
In office would be much better but if they cannot get in then we can do a virtual visit. It should still be 30 min if virtual.

## 2023-09-20 NOTE — TELEPHONE ENCOUNTER
Left message for wife to call back .   Ok to switch to telemed if they are unable to get into the office. See message from KD

## 2023-09-20 NOTE — TELEPHONE ENCOUNTER
Wife called about appointment for 9/25. Wife said that patients electric wheel chair is broke and is having a tough time getting him around. Would you be able to do a telemed appointment or would like them to reschedule to another day for a in office visit?

## 2023-09-25 ENCOUNTER — PATIENT OUTREACH (OUTPATIENT)
Dept: CASE MANAGEMENT | Facility: CLINIC | Age: 73
End: 2023-09-25
Payer: MEDICARE

## 2023-09-25 ENCOUNTER — TELEMEDICINE (OUTPATIENT)
Dept: FAMILY MEDICINE | Facility: CLINIC | Age: 73
End: 2023-09-25
Payer: MEDICARE

## 2023-09-25 DIAGNOSIS — L97.428 DIABETIC ULCER OF LEFT HEEL ASSOCIATED WITH TYPE 2 DIABETES MELLITUS, WITH OTHER ULCER SEVERITY: ICD-10-CM

## 2023-09-25 DIAGNOSIS — R29.6 RECURRENT FALLS: ICD-10-CM

## 2023-09-25 DIAGNOSIS — S62.632D CLOSED DISPLACED FRACTURE OF DISTAL PHALANX OF RIGHT MIDDLE FINGER WITH ROUTINE HEALING: ICD-10-CM

## 2023-09-25 DIAGNOSIS — Z79.4 TYPE 2 DIABETES MELLITUS WITH OTHER SPECIFIED COMPLICATION, WITH LONG-TERM CURRENT USE OF INSULIN: ICD-10-CM

## 2023-09-25 DIAGNOSIS — E11.621 DIABETIC ULCER OF LEFT HEEL ASSOCIATED WITH TYPE 2 DIABETES MELLITUS, WITH OTHER ULCER SEVERITY: ICD-10-CM

## 2023-09-25 DIAGNOSIS — E11.69 TYPE 2 DIABETES MELLITUS WITH OTHER SPECIFIED COMPLICATION, WITH LONG-TERM CURRENT USE OF INSULIN: ICD-10-CM

## 2023-09-25 DIAGNOSIS — N17.9 AKI (ACUTE KIDNEY INJURY) (CMS/HCC): Primary | ICD-10-CM

## 2023-09-25 PROCEDURE — 1111F DSCHRG MED/CURRENT MED MERGE: CPT | Performed by: FAMILY MEDICINE

## 2023-09-25 PROCEDURE — 99214 OFFICE O/P EST MOD 30 MIN: CPT | Mod: 95 | Performed by: FAMILY MEDICINE

## 2023-09-25 NOTE — PROGRESS NOTES
[facility] IN [place]       Verification of Patient Location:  The patient affirms they are currently located in the following state: [place]    Request for Consent:    Audio and Video Encounter   Hello, my name is Richard OjedaDO.  Before we proceed, can you please verify your identification by telling me your full name and date of birth?  Can you tell me who is in the room with you?    You and I are about to have a telemedicine check-in or visit because you have requested it.  This is a live video-conference.  I am a real person, speaking to you in real time.  There is no one else with me on the video-conference. I am not recording this conversation and I am asking you not to record it.  This telemedicine visit will be billed to your health insurance or you, if you are self-insured.  You understand you will be responsible for any copayments or coinsurances that apply to your telemedicine visit.  Communication platform used for this encounter:  Adiana Video Visit (Epic Video Client)       Before starting our telemedicine visit, I am required to get your consent for this virtual check-in or visit by telemedicine. Do you consent?      Patient Response to Request for Consent:  Yes    Reason for visit: No chief complaint on file.    HPI:  Mitchell Sauceda is a 73 y.o. male presenting for follow-up after hospital discharge.    Patient readmitted in the last 30 days: No    Discharge Diagnosis: Fall, R hand fracture, L foot wound infection, DAXA on CKD IIIb, chronic systolic CHF  Discharging Facility: [facility]  Date of Last Admission: 09/10/23  Date of Last Discharge: 09/13/23    Discharging Facilty SNF/Rehab: no          Relevant Specialist Follow-ups: Dr. Mcmullen    Initial TCM Patient Outreach Date: 09/14/23    Summary of Hospital Course: Had fall a went to to the ER. Injured his finger. They did put 2 sutures in his finger. Found to have DAXA while there. Improved to  baseline by time of discharge. Treated with Linzolid for his wound.     Medications prescribed at discharge reconciled with current ambulatory medication list: Yes.    Inpatient testing (labs, imaging, cardiovascular) requiring follow-up: Repeat BMP     History since hospital discharge:   He is feeling okay   Saw the wound care physician this morning and had the wound redressed   Needs to have the sutures removed but does not have an appointment.  He is using the reduced dose of the insulin - BG running 160-170   Seeing the home nurse twice a week     Has been falling more recently. Having difficulty with his motorized chair. Someone is coming out Thursday to repair it     Follow visits:  Ortho - Dr. Solis - to be scheduled   Endo - Dr. Nettles - 11/29   Nephro - Dr. Mcmullen - 10/23  Cardiology - Dr. No - 12/4  Pulmonology - Dr. Mandujano - 10/3      Advance Care Planning Documents     Document Type Status Effective Date Expiration Date Received On Description    Advance Directives and Living Will Not Received        Power of  Not Received              Patient Active Problem List   Diagnosis    Neuropathy    Mixed hyperlipidemia    Hiatal hernia    Gastroesophageal reflux disease    Edema    Diabetic retinopathy (CMS/HCC)    Cataracts, bilateral    Benign essential hypertension    Chronic fatigue    Diabetes mellitus (CMS/HCC)    Anemia    Chronic pain    Morbid obesity (CMS/HCC)    Sleep apnea    Persistent atrial fibrillation (CMS/HCC)    Ambulatory dysfunction    Primary osteoarthritis involving multiple joints    Cardiac pacemaker    Chronic edema    Chronotropic incompetence with sinus node dysfunction (CMS/HCC)    Family history of hypercoagulable state    History of DVT (deep vein thrombosis)    History of pulmonary embolism    Hypokalemia    Shortness of breath    Chronic systolic congestive heart failure (CMS/HCC)    Diabetic ulcer of left heel associated with type 2 diabetes  mellitus (CMS/[facility])    Chronic kidney disease, stage 4 (severe) (CMS/[facility])    Long-term insulin use (CMS/[facility])    DAXA (acute kidney injury) (CMS/[facility])    Elevated troponin I level    Recurrent falls    Diabetic leg ulcer (CMS/[facility])    Fracture of right hand    Pressure injury of buttock, stage 1    Closed displaced fracture of distal phalanx of right middle finger with routine healing     Past Medical History:   Diagnosis Date    Arthritis     GERD (gastroesophageal reflux disease)     Hypertension     Lipid disorder     Neuropathy     Persistent atrial fibrillation (CMS/[facility]) 03/19/2019    Primary osteoarthritis involving multiple joints 05/15/2019    Pulmonary embolism (CMS/[facility])     Type 2 diabetes mellitus (CMS/[facility])     Type 2 diabetes mellitus with hypoglycemia (CMS/[facility]) 10/24/2013    Overview:     Wound healing, delayed      Past Surgical History:   Procedure Laterality Date    KNEE ARTHROSCOPY Bilateral     VEIN SURGERY Right      Social History     Socioeconomic History    Marital status:      Spouse name: Not on file    Number of children: Not on file    Years of education: Not on file    Highest education level: Not on file   Occupational History    Not on file   Tobacco Use    Smoking status: Never    Smokeless tobacco: Never   Substance and Sexual Activity    Alcohol use: No    Drug use: Not on file    Sexual activity: Not on file   Other Topics Concern    Not on file   Social History Narrative    Not on file     Social Determinants of Health     Financial Resource Strain: Low Risk  (9/11/2023)    Overall Financial Resource Strain (CARDIA)     Difficulty of Paying Living Expenses: Not hard at all   Food Insecurity: No Food Insecurity (9/10/2023)    Hunger Vital Sign     Worried About Running Out of Food in the Last Year: Never true     Ran Out of Food in the Last Year: Never true   Transportation Needs: No Transportation Needs (9/11/2023)    PRAPARE - Transportation      Lack of Transportation (Medical): No     Lack of Transportation (Non-Medical): No   Physical Activity: Not on file   Stress: Not on file   Social Connections: Not on file   Intimate Partner Violence: Not on file   Housing Stability: Unknown (9/11/2023)    Housing Stability Vital Sign     Unable to Pay for Housing in the Last Year: No     Number of Places Lived in the Last Year: Not on file     Unstable Housing in the Last Year: No     Family History   Problem Relation Age of Onset    Factor V Leiden deficiency Biological Sister     Factor V Leiden deficiency Nephew      Vancomycin and Prednisone  Current Outpatient Medications   Medication Sig Dispense Refill    ascorbic acid (VITAMIN C) 500 mg tablet Take 1 tablet by mouth daily.      atorvastatin (LIPITOR) 20 mg tablet Take 1 tablet (20 mg total) by mouth daily. 90 tablet 3    collagenase (SANTYL) ointment Apply 1 Application topically daily. 30 g 0    cyanocobalamin (VITAMIN B12) 1,000 mcg tablet Take 1 tablet (1,000 mcg total) by mouth daily. 30 tablet 0    flash glucose scanning reader (FREESTYLE BURT 2 READER) misc Check BG as directed. Change every 2 weeks. DX: Z79.4, E 13.40 1 each 0    flash glucose sensor (FREESTYLE BURT 2 SENSOR) kit Check BG as directed DX: Z79.4, E 13.40 6 kit 1    gabapentin (NEURONTIN) 300 mg capsule TAKE 1 CAPSULE BY MOUTH TWICE A  capsule 0    insulin lispro protamine-lispro, 75/25, (HumaLOG Mix 75-25 KwikPen) 100 unit/mL (75-25) pen Inject 20 Units under the skin 2 (two) times a day. 12 mL 0    KLOR-CON M20 20 mEq CR tablet TAKE 1 TABLET BY MOUTH 2 TIMES A DAY. 180 tablet 0    lancets misc for blood glucose monitoring 3x day      lisinopril (PRINIVIL) 2.5 mg tablet Take 1 tablet (2.5 mg total) by mouth daily. 90 tablet 3    pantoprazole (PROTONIX) 40 mg EC tablet Take 1 tablet (40 mg total) by mouth daily. 90 tablet 0    PEN NEEDLE, DIABETIC (BD ULTRA-FINE MICRO PEN NEEDLE MISC) once daily use with  Levemir      rivaroxaban (XARELTO) 20 mg tablet Take 1 tablet (20 mg total) by mouth daily with dinner. 90 tablet 3    semaglutide (OZEMPIC) 1 mg/dose (2 mg/1.5 mL) subcutaneous injection Inject 1 mg under the skin every (seven) 7 days.       No current facility-administered medications for this visit.       ROS:  Review of Systems    There were no vitals filed for this visit.      Lab Results   Component Value Date    WBC 4.85 09/13/2023    HGB 11.7 (L) 09/13/2023    HCT 36.1 (L) 09/13/2023     (L) 09/13/2023    CHOL 137 06/13/2019    TRIG 99 06/13/2019    HDL 41 06/13/2019    ALT 13 09/10/2023    AST 17 09/10/2023     09/13/2023    K 4.0 09/13/2023     09/13/2023    CREATININE 2.0 (H) 09/13/2023    BUN 35 (H) 09/13/2023    CO2 27 09/13/2023    TSH 2.49 07/10/2018    PSA 0.58 01/31/2022    INR 2.8 (H) 04/17/2015    HGBA1C 7.4 05/19/2023    MICROALBUR 1.1 06/13/2019         ASSESSMENT/PLAN:  Diagnoses and all orders for this visit:    DAXA (acute kidney injury) (CMS/[facility]) (Primary)  Assessment & Plan:  Due for repeat BMP  He will schedule    Orders:  -     Basic metabolic panel; Future    Diabetic ulcer of left heel associated with type 2 diabetes mellitus, with other ulcer severity (CMS/[facility])  Assessment & Plan:  Seeing wound care  He has finished his course of antibiotics      Type 2 diabetes mellitus with other specified complication, with long-term current use of insulin (CMS/[facility])  Assessment & Plan:  Appointment scheduled with his endocrinologist  Home numbers have been acceptable with the reduced dose of insulin  Continue monitoring      Recurrent falls  Assessment & Plan:  His motorized chair has been broken which has been making things more difficult  They are hoping to have it repaired this week      Closed displaced fracture of distal phalanx of right middle finger with routine healing  Assessment & Plan:  Still with sutures  It has been 15 days since sutures were placed  He will see if  his home nurse can take them out, if not he will schedule an appointment with the hand surgeon.  He is supposed to do this anyway, but it will be more urgent if he needs it for suture removal        Time Spent:  I spent 30 minutes on this date of service performing the following activities: obtaining history, documenting and preparing for visit.        Richard Ojeda,   9/25/2023

## 2023-09-25 NOTE — PROGRESS NOTES
LVM for H with request for a return call.        Yecenia Bess MSN, RN  Ambulatory Care Manager  [phone]

## 2023-09-25 NOTE — ASSESSMENT & PLAN NOTE
Appointment scheduled with his endocrinologist  Home numbers have been acceptable with the reduced dose of insulin  Continue monitoring

## 2023-09-25 NOTE — ASSESSMENT & PLAN NOTE
His motorized chair has been broken which has been making things more difficult  They are hoping to have it repaired this week

## 2023-09-25 NOTE — ASSESSMENT & PLAN NOTE
Still with sutures  It has been 15 days since sutures were placed  He will see if his home nurse can take them out, if not he will schedule an appointment with the hand surgeon.  He is supposed to do this anyway, but it will be more urgent if he needs it for suture removal

## 2023-10-03 ENCOUNTER — PATIENT OUTREACH (OUTPATIENT)
Dept: CASE MANAGEMENT | Facility: CLINIC | Age: 73
End: 2023-10-03
Payer: MEDICARE

## 2023-10-03 NOTE — PROGRESS NOTES
LVM for H with request for a return call.        Yecenia Bess MSN, RN  Ambulatory Care Manager  [phone]

## 2023-10-06 ENCOUNTER — TELEPHONE (OUTPATIENT)
Dept: FAMILY MEDICINE | Facility: CLINIC | Age: 73
End: 2023-10-06
Payer: MEDICARE

## 2023-10-06 NOTE — TELEPHONE ENCOUNTER
Visiting nurse Manjeet called in requesting a mobile phlebotomist visit for the patient due to him being unsuccessful with obtaining the patients blood draw for a BMP.

## 2023-10-10 ENCOUNTER — PATIENT OUTREACH (OUTPATIENT)
Dept: CASE MANAGEMENT | Facility: CLINIC | Age: 73
End: 2023-10-10
Payer: MEDICARE

## 2023-10-10 RX ORDER — BUMETANIDE 1 MG/1
1 TABLET ORAL DAILY
COMMUNITY

## 2023-10-10 NOTE — PROGRESS NOTES
"  Difficult to stand on scale due to balance.  Taking bumex 2 mg on recommendation of pulmonologist Dr. Mandujano as patient was \" filled with fluid\"     Continues with wound care and PT at home.  Home sleep study to be conducted to evaluate for new CPAP    Yecenia Bess MSN, RN  Ambulatory Care Manager  [phone]  "

## 2023-10-12 LAB
BUN SERPL-MCNC: 19 MG/DL (ref 8–27)
BUN/CREAT SERPL: 12 (ref 10–24)
CALCIUM SERPL-MCNC: 8.5 MG/DL (ref 8.6–10.2)
CHLORIDE SERPL-SCNC: 101 MMOL/L (ref 96–106)
CO2 SERPL-SCNC: 27 MMOL/L (ref 20–29)
CREAT SERPL-MCNC: 1.58 MG/DL (ref 0.76–1.27)
EGFRCR SERPLBLD CKD-EPI 2021: 45 ML/MIN/1.73
GLUCOSE SERPL-MCNC: 226 MG/DL (ref 70–99)
POTASSIUM SERPL-SCNC: 4.5 MMOL/L (ref 3.5–5.2)
SODIUM SERPL-SCNC: 142 MMOL/L (ref 134–144)

## 2023-10-12 NOTE — RESULT ENCOUNTER NOTE
Please let him know that his kidney function was much closer to his normal.  It was still mildly decreased.  I recommend he have this rechecked in 1 month.

## 2023-10-17 ENCOUNTER — PATIENT OUTREACH (OUTPATIENT)
Dept: CASE MANAGEMENT | Facility: CLINIC | Age: 73
End: 2023-10-17
Payer: MEDICARE

## 2023-10-17 NOTE — PROGRESS NOTES
LVM for H with request for a return call.        Yecenia Bess MSN, RN  Ambulatory Care Manager  [phone]

## 2023-10-24 ENCOUNTER — PATIENT OUTREACH (OUTPATIENT)
Dept: CASE MANAGEMENT | Facility: CLINIC | Age: 73
End: 2023-10-24
Payer: MEDICARE

## 2023-10-24 NOTE — PROGRESS NOTES
LVM for a CCM with request for a return call.            Yecenia Bess MSN, RN  Ambulatory Care Manager  [phone]

## 2023-10-27 ENCOUNTER — TELEPHONE (OUTPATIENT)
Dept: FAMILY MEDICINE | Facility: CLINIC | Age: 73
End: 2023-10-27
Payer: MEDICARE

## 2023-10-27 NOTE — TELEPHONE ENCOUNTER
Which medical supply company are they using?  Once we get this info reach out to them to see what specifically they require. It is not usually as easy as just sending a script.

## 2023-10-27 NOTE — TELEPHONE ENCOUNTER
Patient's wife called in and stated that his electric wheelchair is falling apart. She says that they no longer sell the parts to fix the wheelchair and she need a new prescription script to get him another wheelchair

## 2023-11-01 NOTE — TELEPHONE ENCOUNTER
NASEEM received from Catherine at [facility].   Medicare will not approve the power wheelchair at this time. He needs to have the wheelchair for a full 5 years before it can be replaced. This is not until July of 2024.  We can start the process for a new power wheelchair in May 2024.    Catherine can be reached @ [phone] x [phone] if we have further questions/concerns.

## 2023-11-10 ENCOUNTER — PATIENT OUTREACH (OUTPATIENT)
Dept: CASE MANAGEMENT | Facility: CLINIC | Age: 73
End: 2023-11-10
Payer: MEDICARE

## 2023-11-10 NOTE — PROGRESS NOTES
LVM for CCM with request for a return call.          Yecenia Bess MSN, RN  Ambulatory Care Manager  [phone]

## 2023-11-13 RX ORDER — GABAPENTIN 300 MG/1
300 CAPSULE ORAL 2 TIMES DAILY
Qty: 180 CAPSULE | Refills: 0 | Status: SHIPPED | OUTPATIENT
Start: 2023-11-13 | End: 2024-01-15

## 2023-11-13 NOTE — TELEPHONE ENCOUNTER
Medicine Refill Request    Last Office Visit: 5/30/2023   Last Consult Visit: Visit date not found  Last Telemedicine Visit: 9/25/2023 Richard Ojeda DO    Next Appointment: Visit date not found      Current Outpatient Medications:     ascorbic acid (VITAMIN C) 500 mg tablet, Take 1 tablet by mouth daily., Disp: , Rfl:     atorvastatin (LIPITOR) 20 mg tablet, Take 1 tablet (20 mg total) by mouth daily., Disp: 90 tablet, Rfl: 3    bumetanide (BUMEX) 1 mg tablet, Take 2 mg by mouth daily., Disp: , Rfl:     collagenase (SANTYL) ointment, Apply 1 Application topically daily., Disp: 30 g, Rfl: 0    cyanocobalamin (VITAMIN B12) 1,000 mcg tablet, Take 1 tablet (1,000 mcg total) by mouth daily., Disp: 30 tablet, Rfl: 0    flash glucose scanning reader (FREESTYLE BURT 2 READER) [facility], Check BG as directed. Change every 2 weeks. DX: Z79.4, E 13.40, Disp: 1 each, Rfl: 0    flash glucose sensor (FREESTYLE BURT 2 SENSOR) kit, Check BG as directed DX: Z79.4, E 13.40, Disp: 6 kit, Rfl: 1    gabapentin (NEURONTIN) 300 mg capsule, TAKE 1 CAPSULE BY MOUTH TWICE A DAY, Disp: 180 capsule, Rfl: 0    insulin lispro protamine-lispro, 75/25, (HumaLOG Mix 75-25 KwikPen) 100 unit/mL (75-25) pen, Inject 20 Units under the skin 2 (two) times a day., Disp: 12 mL, Rfl: 0    KLOR-CON M20 20 mEq CR tablet, TAKE 1 TABLET BY MOUTH 2 TIMES A DAY., Disp: 180 tablet, Rfl: 0    lancets misc, for blood glucose monitoring 3x day, Disp: , Rfl:     lisinopril (PRINIVIL) 2.5 mg tablet, Take 1 tablet (2.5 mg total) by mouth daily., Disp: 90 tablet, Rfl: 3    miscellaneous medical supply misc, Bariatric Motorized Wheelchair. Dx: I50.22, M15.9, E11.621, L97.429, E66.01, R26.2, Disp: 1 each, Rfl: 0    pantoprazole (PROTONIX) 40 mg EC tablet, TAKE 1 TABLET BY MOUTH EVERY DAY, Disp: 90 tablet, Rfl: 3    PEN NEEDLE, DIABETIC (BD ULTRA-FINE MICRO PEN NEEDLE MISC), once daily use with Levemir, Disp: , Rfl:     rivaroxaban (XARELTO) 20 mg tablet,  Take 1 tablet (20 mg total) by mouth daily with dinner., Disp: 90 tablet, Rfl: 3    semaglutide (OZEMPIC) 1 mg/dose (2 mg/1.5 mL) subcutaneous injection, Inject 1 mg under the skin every (seven) 7 days., Disp: , Rfl:       BP Readings from Last 3 Encounters:   09/13/23 (!) 120/58   05/30/23 134/74   02/15/23 124/76       Recent Lab results:  Lab Results   Component Value Date    CHOL 137 06/13/2019   ,   Lab Results   Component Value Date    HDL 41 06/13/2019   ,   Lab Results   Component Value Date    LDLCALC 78 06/13/2019   ,   Lab Results   Component Value Date    TRIG 99 06/13/2019        Lab Results   Component Value Date    GLUCOSE 226 (H) 10/11/2023   ,   Lab Results   Component Value Date    HGBA1C 7.4 05/19/2023         Lab Results   Component Value Date    CREATININE 1.58 (H) 10/11/2023       Lab Results   Component Value Date    TSH 2.49 07/10/2018           Lab Results   Component Value Date    HGBA1C 7.4 05/19/2023

## 2023-11-21 ENCOUNTER — PATIENT OUTREACH (OUTPATIENT)
Dept: CASE MANAGEMENT | Facility: CLINIC | Age: 73
End: 2023-11-21
Payer: MEDICARE

## 2023-11-21 NOTE — PROGRESS NOTES
LVM for CCM with request for a return call.          Yecenia Bess MSN, RN  Ambulatory Care Manager  [phone]

## 2023-12-04 ENCOUNTER — EXTERNAL RECORD (OUTPATIENT)
Dept: HEALTH INFORMATION MANAGEMENT | Facility: OTHER | Age: 73
End: 2023-12-04

## 2023-12-06 ENCOUNTER — PATIENT OUTREACH (OUTPATIENT)
Dept: CASE MANAGEMENT | Facility: CLINIC | Age: 73
End: 2023-12-06
Payer: MEDICARE

## 2023-12-06 NOTE — PROGRESS NOTES
ACM will close episode. Patient and or spouse have have not responded to voicemail messages.          Yecenia Bess MSN, RN  Ambulatory Care Manager  [phone]

## 2023-12-26 ENCOUNTER — TELEPHONE (OUTPATIENT)
Dept: FAMILY MEDICINE | Facility: CLINIC | Age: 73
End: 2023-12-26

## 2023-12-26 NOTE — TELEPHONE ENCOUNTER
Patient daughter called today requesting a script for a wheel chair.      Request for Medical Advice (NON-URGENT)   Patient PCP: Richard Ojeda,   New or Existing Issue: new  Question or Concern: Patient also states that her dad has a sore/open wound on his private area. Patient has not showered in 3 months due to him having issues standing. Patients daughter states that her mother(Renu) is not in the right mind set as well. His daughter would like to know if Dr. Ojeda can help assist in how to get home health services. Please call daughter Juliane back at [phone].  Preferred Pharmacy:   [facility]/pharmacy #[id number] - [place] - [address]  [address] [place] [address]  Phone: [phone] Fax: [phone]      The practice will reach out to discuss your Medical Question or Concern within 2 business days.

## 2023-12-26 NOTE — TELEPHONE ENCOUNTER
Spoke with patient's daughter, mateo. Phone # is [phone].  He needs a new wheelchair because the parts to fic the current one are not available. It has not been 5 years yet. Patient's daughter is going to call the insurance company to see if an appeal can be done to override the 5 year wait. She will let us know if they approve it and then we can resend the paperwork. She is concerned about a new wound on his scrotum. Patient has home care currently for another wound. Patient's daughter will ask the nurse to look at the other wound when she is there and have her call us with the description and if additional wound care orders are needed. Patient has not had bath or shower is about three months due to inability to get into one with help from his wife. The patient would benefit from a home fartun aide to assist with ADLs. Will also reach out to case management and/or social work to help family obtain needed services.

## 2024-01-02 NOTE — TELEPHONE ENCOUNTER
Can you please reach out to his daughter, Juliane, regarding getting him more care in the home  She is the power of

## 2024-01-02 NOTE — TELEPHONE ENCOUNTER
Spoke with Juliane  She does plan on submitting the appeal this week.  She has information from the [employer] health medical supply company saying they do not have the part to fix his wheelchair that she plans to submit  She is not sure which specific nurse is his home visiting nurse and has not been able to speak with them yet.  She will give me their information so I can reach out to them about them looking at his other wound  She would be interested in speaking with the  to discuss what they can do to help him further in the home  She does have him on a waiting list for the [facility] in [place], which is an assisted living.  He is open to going there but his wife is not.  Discussed that I agree that he needs more help in the home, and likely would benefit from assisted living as his wife is no longer able to care for him adequately

## 2024-01-03 ENCOUNTER — PATIENT OUTREACH (OUTPATIENT)
Dept: CASE MANAGEMENT | Facility: CLINIC | Age: 74
End: 2024-01-03
Payer: MEDICARE

## 2024-01-03 ENCOUNTER — EXTERNAL RECORD (OUTPATIENT)
Dept: HEALTH INFORMATION MANAGEMENT | Facility: OTHER | Age: 74
End: 2024-01-03

## 2024-01-03 NOTE — PROGRESS NOTES
Social Work Note:       Agency contact:    SW received referral from PCP to follow up with pt's daughter/TATA Cardozo [phone] regarding in home support services.     Situation/background:    73 year old male, lives at home with his wife in [place] and has medicare insurance.     Per PCP pt has a wound and is already receiving skilled HHC services. Pt has a wheelchair he got within the last five years. Daughter already reached out to the supplier and they do not have the part to fix his wheelchair. She was going to see if she can put in an appeal with his insurance to get him another one. She does have him on a waiting list for the [facility] in [place], which is an assisted living. He is open to going there but his wife is not.    Intervention/Follow up    SW attempted to contact daughter today to further assess/provide resources however a voicemail was left with direct phone number requesting a return call.     To explore wheelchairs, she can consider [facility] [phone] [address]. Or [facility] [address] [place] [address]  Phone: [phone]. They have discounted DME that may be helpful.     For additional care in the home, we can assess if he has a long term care insurance policy, paying privately for a caregiver (can send her a list of agencies), or if he meets the income requirement a referral to the [facility] office of aging.     If they are looking at assisted livings, they may have funds. [facility] senior [facility] [phone] could be a good resource for them to explore other placement options in the community.     Catherine Bañuelos [phone], GINGER for this practice to try them again next week. This worker will remain available in the meantime.     COCO Bello  [phone]

## 2024-01-05 DIAGNOSIS — E11.621 DIABETIC ULCER OF LEFT HEEL ASSOCIATED WITH TYPE 2 DIABETES MELLITUS, WITH OTHER ULCER SEVERITY: Primary | ICD-10-CM

## 2024-01-05 DIAGNOSIS — L97.428 DIABETIC ULCER OF LEFT HEEL ASSOCIATED WITH TYPE 2 DIABETES MELLITUS, WITH OTHER ULCER SEVERITY: Primary | ICD-10-CM

## 2024-01-05 NOTE — PROGRESS NOTES
Social Work follow up note:    SW was able to connect with the daughter today.     She is in [place] trying to facilitate their care. She was visiting over the holiday and states he has not had a shower in 3 months. She reports he is getting a nurse visit for wound care and he does follow up with a wound doctor.     [facility] was supposedly the ones who denied a new order for a wheelchair because he is not due until July of 2024. She states she was working with [employer] on getting the part for his current wheelchair but it is on back order. SW encouraged her to continue to try and appeal or wait for the part.      They currently pay for someone to come out to manually push him to get him out into appointments. This is a friend of the family they pay as needed. He provides them transportation to and from appointments in the pt's van that has a ramp, she is trying to get her aunt to be willing to go with them to appointments in the future.     Pt's wife goes out to get them chick delisa-a often and that is the way their diet has been. She states the pt doesn't look like he has been eating and may have lost weight.      Pt receives social security $2937/month from [employer], and $469/month [place]/pension $1,500/month from his [employer]. He also has funds in his savings, shared with his wife/joint account that might be over $15,000. Pt appears to be over income to qualify for the office of aging/waiver and options program.     He is not connected to the [facility], pt does not have long term care insurance. His wife does and the daughter is actively trying to utilize her benefits.     She has them both on a wait list at [facility]. Admissions is looking to see what would be available and when and will get back to the daughter.     Pt's daughter's email is  [email or web address], she was receptive to the following resources being emailed to her:  -Devin  Law:  [email or web address]  [phone]    -[facility] senior advisors [phone] could be a good resource for them to explore other placement options in the community.     -To explore wheelchairs, please consider:  -[facility] [phone] [address]. Or   -[facility] [address], [place] [address]  Phone: [phone].   They have discounted DME that may be helpful.    -List pf private duty caregivers     GINGER called [facility]:  [phone], confirmed he is on service with them and that they could offer an aide to go out to assist with bathing. GINGER sent secure chat to PCP and office RN with this request. Daughter was made aware of this.     Catherine Bañuelos [phone], GINGER for this practice to follow up in 2 weeks. This worker will remain available in the meantime.     COCO Bello  [phone]

## 2024-01-09 ENCOUNTER — PATIENT OUTREACH (OUTPATIENT)
Dept: CASE MANAGEMENT | Facility: CLINIC | Age: 74
End: 2024-01-09
Payer: MEDICARE

## 2024-01-09 NOTE — PROGRESS NOTES
Social Work follow up note:    [facility]s adult daughter, Jul[place] in [place] who is managing her parents care from afar. They are newly on a waiting list at the [facility] in [place] which can be a 2 month to 3 year wait.  [facility] and can receive a hha to help with bathing  Pt.'s daughter visits three times a year and was just here over [place]. She was concerned that her Dad had not showered for 3 months.  Pt.'s wife has a long term care policy and will start hha services next week through [facility]. They will open a new [employer] bank account that has branches locally and in [place] and [place] so their daughter can help as needed with their finances and bill -paying.  Pt.'s wife is looking onto borrowing a wheelchair through [facility] in [place] or buying a discounted one at [employer] in [place].  Pt.'s daughter has VA hospital's contact information for any further concerns or questions..   to follow up in 3-4 weeks.    Catherine Bañuelos, LEVI  [phone]

## 2024-01-15 RX ORDER — GABAPENTIN 300 MG/1
300 CAPSULE ORAL 2 TIMES DAILY
Qty: 180 CAPSULE | Refills: 0 | Status: SHIPPED | OUTPATIENT
Start: 2024-01-15 | End: 2024-02-06 | Stop reason: SDUPTHER

## 2024-01-15 NOTE — TELEPHONE ENCOUNTER
Medicine Refill Request    Last Office Visit: 5/30/2023   Last Consult Visit: Visit date not found  Last Telemedicine Visit: 9/25/2023 Richard Ojeda DO    Next Appointment: 2/21/2024      Current Outpatient Medications:   •  ascorbic acid (VITAMIN C) 500 mg tablet, Take 1 tablet by mouth daily., Disp: , Rfl:   •  atorvastatin (LIPITOR) 20 mg tablet, Take 1 tablet (20 mg total) by mouth daily., Disp: 90 tablet, Rfl: 3  •  bumetanide (BUMEX) 1 mg tablet, Take 2 mg by mouth daily., Disp: , Rfl:   •  collagenase (SANTYL) ointment, Apply 1 Application topically daily., Disp: 30 g, Rfl: 0  •  cyanocobalamin (VITAMIN B12) 1,000 mcg tablet, Take 1 tablet (1,000 mcg total) by mouth daily., Disp: 30 tablet, Rfl: 0  •  flash glucose scanning reader (FREESTYLE BURT 2 READER) [facility], Check BG as directed. Change every 2 weeks. DX: Z79.4, E 13.40, Disp: 1 each, Rfl: 0  •  flash glucose sensor (FREESTYLE BURT 2 SENSOR) kit, Check BG as directed DX: Z79.4, E 13.40, Disp: 6 kit, Rfl: 1  •  gabapentin (NEURONTIN) 300 mg capsule, TAKE 1 CAPSULE BY MOUTH TWICE A DAY, Disp: 180 capsule, Rfl: 0  •  insulin lispro protamine-lispro, 75/25, (HumaLOG Mix 75-25 KwikPen) 100 unit/mL (75-25) pen, Inject 20 Units under the skin 2 (two) times a day., Disp: 12 mL, Rfl: 0  •  KLOR-CON M20 20 mEq CR tablet, TAKE 1 TABLET BY MOUTH 2 TIMES A DAY., Disp: 180 tablet, Rfl: 0  •  lancets misc, for blood glucose monitoring 3x day, Disp: , Rfl:   •  lisinopril (PRINIVIL) 2.5 mg tablet, Take 1 tablet (2.5 mg total) by mouth daily., Disp: 90 tablet, Rfl: 3  •  miscellaneous medical supply misc, Bariatric [employer] Wheelchair [employer]. Dx: I50.22, M15.9, E11.621, L97.429, E66.01, R26.2, Disp: 1 each, Rfl: 0  •  pantoprazole (PROTONIX) 40 mg EC tablet, TAKE 1 TABLET BY MOUTH EVERY DAY, Disp: 90 tablet, Rfl: 3  •  PEN NEEDLE, DIABETIC (BD ULTRA-FINE MICRO PEN NEEDLE MISC), once daily use with Levemir, Disp: , Rfl:   •  rivaroxaban (XARELTO) 20 mg tablet, Take 1  tablet (20 mg total) by mouth daily with dinner., Disp: 90 tablet, Rfl: 3  •  semaglutide (OZEMPIC) 1 mg/dose (2 mg/1.5 mL) subcutaneous injection, Inject 1 mg under the skin every (seven) 7 days., Disp: , Rfl:       BP Readings from Last 3 Encounters:   09/13/23 (!) 120/58   05/30/23 134/74   02/15/23 124/76       Recent Lab results:  Lab Results   Component Value Date    CHOL 137 06/13/2019   ,   Lab Results   Component Value Date    HDL 41 06/13/2019   ,   Lab Results   Component Value Date    LDLCALC 78 06/13/2019   ,   Lab Results   Component Value Date    TRIG 99 06/13/2019        Lab Results   Component Value Date    GLUCOSE 226 (H) 10/11/2023   ,   Lab Results   Component Value Date    HGBA1C 7.4 05/19/2023         Lab Results   Component Value Date    CREATININE 1.58 (H) 10/11/2023       Lab Results   Component Value Date    TSH 2.49 07/10/2018           Lab Results   Component Value Date    HGBA1C 7.4 05/19/2023

## 2024-02-06 RX ORDER — GABAPENTIN 300 MG/1
300 CAPSULE ORAL 2 TIMES DAILY
Qty: 180 CAPSULE | Refills: 0 | Status: SHIPPED | OUTPATIENT
Start: 2024-02-06 | End: 2024-04-12 | Stop reason: SDUPTHER

## 2024-02-06 NOTE — TELEPHONE ENCOUNTER
Medicine Refill Request    Last Office Visit: 5/30/2023   Last Consult Visit: Visit date not found  Last Telemedicine Visit: 9/25/2023 Richard Ojeda DO    Next Appointment: 2/21/2024      Current Outpatient Medications:   •  ascorbic acid (VITAMIN C) 500 mg tablet, Take 1 tablet by mouth daily., Disp: , Rfl:   •  atorvastatin (LIPITOR) 20 mg tablet, Take 1 tablet (20 mg total) by mouth daily., Disp: 90 tablet, Rfl: 3  •  bumetanide (BUMEX) 1 mg tablet, Take 2 mg by mouth daily., Disp: , Rfl:   •  collagenase (SANTYL) ointment, Apply 1 Application topically daily., Disp: 30 g, Rfl: 0  •  cyanocobalamin (VITAMIN B12) 1,000 mcg tablet, Take 1 tablet (1,000 mcg total) by mouth daily., Disp: 30 tablet, Rfl: 0  •  flash glucose scanning reader (FREESTYLE BURT 2 READER) [facility], Check BG as directed. Change every 2 weeks. DX: Z79.4, E 13.40, Disp: 1 each, Rfl: 0  •  flash glucose sensor (FREESTYLE BURT 2 SENSOR) kit, Check BG as directed DX: Z79.4, E 13.40, Disp: 6 kit, Rfl: 1  •  gabapentin (NEURONTIN) 300 mg capsule, TAKE 1 CAPSULE BY MOUTH TWICE A DAY, Disp: 180 capsule, Rfl: 0  •  insulin lispro protamine-lispro, 75/25, (HumaLOG Mix 75-25 KwikPen) 100 unit/mL (75-25) pen, Inject 20 Units under the skin 2 (two) times a day., Disp: 12 mL, Rfl: 0  •  KLOR-CON M20 20 mEq CR tablet, TAKE 1 TABLET BY MOUTH 2 TIMES A DAY., Disp: 180 tablet, Rfl: 0  •  lancets misc, for blood glucose monitoring 3x day, Disp: , Rfl:   •  lisinopril (PRINIVIL) 2.5 mg tablet, Take 1 tablet (2.5 mg total) by mouth daily., Disp: 90 tablet, Rfl: 3  •  miscellaneous medical supply misc, [employer]. Dx: I50.22, M15.9, E11.621, L97.429, E66.01, R26.2, Disp: 1 each, Rfl: 0  •  pantoprazole (PROTONIX) 40 mg EC tablet, TAKE 1 TABLET BY MOUTH EVERY DAY, Disp: 90 tablet, Rfl: 3  •  PEN NEEDLE, DIABETIC (BD ULTRA-FINE MICRO PEN NEEDLE MISC), once daily use with Levemir, Disp: , Rfl:   •  rivaroxaban (XARELTO) 20 mg tablet, Take 1  tablet (20 mg total) by mouth daily with dinner., Disp: 90 tablet, Rfl: 3  •  semaglutide (OZEMPIC) 1 mg/dose (2 mg/1.5 mL) subcutaneous injection, Inject 1 mg under the skin every (seven) 7 days., Disp: , Rfl:       BP Readings from Last 3 Encounters:   09/13/23 (!) 120/58   05/30/23 134/74   02/15/23 124/76       Recent Lab results:  Lab Results   Component Value Date    CHOL 137 06/13/2019   ,   Lab Results   Component Value Date    HDL 41 06/13/2019   ,   Lab Results   Component Value Date    LDLCALC 78 06/13/2019   ,   Lab Results   Component Value Date    TRIG 99 06/13/2019        Lab Results   Component Value Date    GLUCOSE 226 (H) 10/11/2023   ,   Lab Results   Component Value Date    HGBA1C 7.4 05/19/2023         Lab Results   Component Value Date    CREATININE 1.58 (H) 10/11/2023       Lab Results   Component Value Date    TSH 2.49 07/10/2018           Lab Results   Component Value Date    HGBA1C 7.4 05/19/2023

## 2024-03-01 ENCOUNTER — TELEPHONE (OUTPATIENT)
Dept: FAMILY MEDICINE | Facility: CLINIC | Age: 74
End: 2024-03-01
Payer: MEDICARE

## 2024-03-01 NOTE — TELEPHONE ENCOUNTER
Spoke with Kath  Discussed have not seen Mitchell in 6 months, so cannot complete the form for him  She said it is not necessary for him as he is physically unable to get in the car to drive at this point and has no intention of driving  If this changes she will let me know  She still does need the form complete for her mother
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
## 2024-03-04 ENCOUNTER — PATIENT OUTREACH (OUTPATIENT)
Dept: CASE MANAGEMENT | Facility: CLINIC | Age: 74
End: 2024-03-04
Payer: MEDICARE

## 2024-03-04 NOTE — PROGRESS NOTES
Emmy Casarez was seen and treated in our emergency department on 3/15/2022. [unfilled]    School Note    Date: March 15, 2022     To Whom It May concern:    Emmy Casarez was seen and treated today in the emergency department by the following provider(s):  Attending Provider: Mandie Barlow MD.      Emmy Casarez may return to school on 3/17/2022.     Sincerely,          MD Mandie Marie MD Social Work follow up note:    [facility] LSW called pt.'s daughter, Kath, so see where they are at in process of looking for Assisted Living.   Pt. is on a waiting list at the [facility] in [place] and it may be a 2 month to 3 year wait. They are hoping for a 2 bedroom unit. Pt.'s wife has toured. Pt. has not yet seen it.  Pt. is a conchis and is a priority admission. It is close to pt.'s sister who  lives out there and will also move into the [facility] as well.   Pt 's daughter will  be back out in [place] in March to take her Mom to neurology appt. on March 22nd. There is a family firiend coming over to visit and make sure that pt.'s wife takes her medications. They pay another family friend, Ortega, who comes over a couple of times a week and takes them to doctor's appt and does their grocery shopping and picking up prescriptions.  Pt's daughter needs detailed information about her Dad's Dad's wound care which she needs to provide to [facility] for level of care that he may need. She willc all [facility] directly.  Pt.'s daughter does have referral information for [facility] in case they need to explore another options for Assisted [phone]   to follow up in 3 weeks.    TANYA Farooq  [phone]

## 2024-03-27 ENCOUNTER — PATIENT OUTREACH (OUTPATIENT)
Dept: CASE MANAGEMENT | Facility: CLINIC | Age: 74
End: 2024-03-27
Payer: MEDICARE

## 2024-03-28 NOTE — PROGRESS NOTES
Social Work follow up note:    [facility] LSW called pt.'s adult daughter, Kath, who is in town from [facility] helping her parents with their medical appts and private duty caregivers. They will have a ne caregiver this weekend. Their family friend ,Ortega ,and Aunt Chuyita continue to help out as needed. They did not tour the [facility] in [place] yet.  Pt. 's daughter sent all of the needed records to the [facility] for their review. And consideration for admission.  Pt.'s daughter is handling all of their banking.  Pt.'s daughter will drive back to her home in [place] on Sat. March 30th.   to follow up in 2-3 weeks    TANYA Farooq  [phone]

## 2024-04-04 ENCOUNTER — EXTERNAL RECORD (OUTPATIENT)
Dept: HEALTH INFORMATION MANAGEMENT | Facility: OTHER | Age: 74
End: 2024-04-04

## 2024-04-09 ENCOUNTER — PATIENT OUTREACH (OUTPATIENT)
Dept: CASE MANAGEMENT | Facility: CLINIC | Age: 74
End: 2024-04-09
Payer: MEDICARE

## 2024-04-09 NOTE — PROGRESS NOTES
Social Work follow up note:    [facility] LSW,Yvette Madrigal, e-mailed [facility] LSW that she had spoken with pt.'s daughter. Juliane, at length this am and emailed her resources for [employer]als [employer] Wheels, [employer] and [employer] attorneys. Pt cannot afford to go to the [facility] in [place] after all Their daughter is looking into to other options including near her in [place].  Pt.'s wife was hospitalized yesterday at  for CHF and may go to a SNF.  [facility] LSW will continue to follow.    Catherine Bañuelos, LSW  [phone]

## 2024-04-15 ENCOUNTER — TELEPHONE (OUTPATIENT)
Dept: FAMILY MEDICINE | Facility: CLINIC | Age: 74
End: 2024-04-15
Payer: MEDICARE

## 2024-04-15 DIAGNOSIS — Z11.1 SCREENING FOR TUBERCULOSIS: Primary | ICD-10-CM

## 2024-04-18 ENCOUNTER — APPOINTMENT (OUTPATIENT)
Dept: LAB | Age: 74
End: 2024-04-18
Attending: PHYSICIAN ASSISTANT
Payer: MEDICARE

## 2024-04-18 ENCOUNTER — OFFICE VISIT (OUTPATIENT)
Dept: FAMILY MEDICINE | Facility: CLINIC | Age: 74
End: 2024-04-18
Payer: MEDICARE

## 2024-04-18 VITALS
RESPIRATION RATE: 16 BRPM | DIASTOLIC BLOOD PRESSURE: 74 MMHG | HEIGHT: 72 IN | HEART RATE: 60 BPM | BODY MASS INDEX: 40.9 KG/M2 | OXYGEN SATURATION: 98 % | WEIGHT: 302 LBS | TEMPERATURE: 98.8 F | SYSTOLIC BLOOD PRESSURE: 138 MMHG

## 2024-04-18 DIAGNOSIS — E11.69 TYPE 2 DIABETES MELLITUS WITH OTHER SPECIFIED COMPLICATION, WITH LONG-TERM CURRENT USE OF INSULIN: Primary | ICD-10-CM

## 2024-04-18 DIAGNOSIS — Z79.4 TYPE 2 DIABETES MELLITUS WITH OTHER SPECIFIED COMPLICATION, WITH LONG-TERM CURRENT USE OF INSULIN: Primary | ICD-10-CM

## 2024-04-18 DIAGNOSIS — E78.2 MIXED HYPERLIPIDEMIA: ICD-10-CM

## 2024-04-18 DIAGNOSIS — E11.69 TYPE 2 DIABETES MELLITUS WITH OTHER SPECIFIED COMPLICATION, WITH LONG-TERM CURRENT USE OF INSULIN: ICD-10-CM

## 2024-04-18 DIAGNOSIS — D69.6 PLATELETS DECREASED (CMS/HCC): ICD-10-CM

## 2024-04-18 DIAGNOSIS — G62.9 NEUROPATHY: ICD-10-CM

## 2024-04-18 DIAGNOSIS — Z11.1 TUBERCULOSIS SCREENING: ICD-10-CM

## 2024-04-18 DIAGNOSIS — I48.19 PERSISTENT ATRIAL FIBRILLATION (CMS/HCC): ICD-10-CM

## 2024-04-18 DIAGNOSIS — E66.01 MORBID OBESITY (CMS/HCC): ICD-10-CM

## 2024-04-18 DIAGNOSIS — I50.22 CHRONIC SYSTOLIC CONGESTIVE HEART FAILURE (CMS/HCC): ICD-10-CM

## 2024-04-18 DIAGNOSIS — I70.0 ATHEROSCLEROSIS OF AORTA (CMS/HCC): ICD-10-CM

## 2024-04-18 DIAGNOSIS — N18.4 CHRONIC KIDNEY DISEASE, STAGE 4 (SEVERE) (CMS/HCC): ICD-10-CM

## 2024-04-18 DIAGNOSIS — Z79.4 TYPE 2 DIABETES MELLITUS WITH OTHER SPECIFIED COMPLICATION, WITH LONG-TERM CURRENT USE OF INSULIN: ICD-10-CM

## 2024-04-18 DIAGNOSIS — Z00.00 HEALTHCARE MAINTENANCE: ICD-10-CM

## 2024-04-18 DIAGNOSIS — E11.621 DIABETIC ULCER OF LEFT HEEL ASSOCIATED WITH TYPE 2 DIABETES MELLITUS, WITH OTHER ULCER SEVERITY: ICD-10-CM

## 2024-04-18 DIAGNOSIS — L97.428 DIABETIC ULCER OF LEFT HEEL ASSOCIATED WITH TYPE 2 DIABETES MELLITUS, WITH OTHER ULCER SEVERITY: ICD-10-CM

## 2024-04-18 LAB
ALBUMIN SERPL-MCNC: 3.7 G/DL (ref 3.5–5.7)
ALP SERPL-CCNC: 96 IU/L (ref 34–125)
ALT SERPL-CCNC: 9 IU/L (ref 7–52)
ANION GAP SERPL CALC-SCNC: 6 MEQ/L (ref 3–15)
AST SERPL-CCNC: 10 IU/L (ref 13–39)
BASOPHILS # BLD: 0.03 K/UL (ref 0.01–0.1)
BASOPHILS NFR BLD: 0.7 %
BILIRUB SERPL-MCNC: 0.6 MG/DL (ref 0.3–1.2)
BUN SERPL-MCNC: 37 MG/DL (ref 7–25)
CALCIUM SERPL-MCNC: 8.8 MG/DL (ref 8.6–10.3)
CHLORIDE SERPL-SCNC: 104 MEQ/L (ref 98–107)
CHOLEST SERPL-MCNC: 113 MG/DL
CO2 SERPL-SCNC: 30 MEQ/L (ref 21–31)
CREAT SERPL-MCNC: 3 MG/DL (ref 0.7–1.3)
CREAT UR-MCNC: 80 MG/DL
DIFFERENTIAL METHOD BLD: ABNORMAL
EGFRCR SERPLBLD CKD-EPI 2021: 21 ML/MIN/1.73M*2
EOSINOPHIL # BLD: 0.21 K/UL (ref 0.04–0.54)
EOSINOPHIL NFR BLD: 5 %
ERYTHROCYTE [DISTWIDTH] IN BLOOD BY AUTOMATED COUNT: 15.2 % (ref 11.6–14.4)
EST. AVERAGE GLUCOSE BLD GHB EST-MCNC: 169 MG/DL
GLUCOSE SERPL-MCNC: 152 MG/DL (ref 70–99)
HBA1C MFR BLD: 7.5 %
HCT VFR BLD AUTO: 36.6 % (ref 40.1–51)
HDLC SERPL-MCNC: 48 MG/DL
HDLC SERPL: 2.4 {RATIO}
HGB BLD-MCNC: 11.4 G/DL (ref 13.7–17.5)
IMM GRANULOCYTES # BLD AUTO: 0 K/UL (ref 0–0.08)
IMM GRANULOCYTES NFR BLD AUTO: 0 %
LDLC SERPL CALC-MCNC: 47 MG/DL
LYMPHOCYTES # BLD: 0.4 K/UL (ref 1.2–3.5)
LYMPHOCYTES NFR BLD: 9.5 %
MCH RBC QN AUTO: 29.7 PG (ref 28–33.2)
MCHC RBC AUTO-ENTMCNC: 31.1 G/DL (ref 32.2–36.5)
MCV RBC AUTO: 95.3 FL (ref 83–98)
MICROALBUMIN UR-MCNC: 45.6 MG/L
MICROALBUMIN/CREAT UR: 57 UG/MG
MONOCYTES # BLD: 0.44 K/UL (ref 0.3–1)
MONOCYTES NFR BLD: 10.5 %
NEUTROPHILS # BLD: 3.12 K/UL (ref 1.7–7)
NEUTS SEG NFR BLD: 74.3 %
NONHDLC SERPL-MCNC: 65 MG/DL
NRBC BLD-RTO: 0 %
PDW BLD AUTO: 10.6 FL (ref 9.4–12.4)
PLATELET # BLD AUTO: 171 K/UL (ref 150–350)
POTASSIUM SERPL-SCNC: 5.5 MEQ/L (ref 3.5–5.1)
PROT SERPL-MCNC: 7.5 G/DL (ref 6–8.2)
RBC # BLD AUTO: 3.84 M/UL (ref 4.5–5.8)
SODIUM SERPL-SCNC: 140 MEQ/L (ref 136–145)
TRIGL SERPL-MCNC: 89 MG/DL
WBC # BLD AUTO: 4.2 K/UL (ref 3.8–10.5)

## 2024-04-18 PROCEDURE — 80061 LIPID PANEL: CPT

## 2024-04-18 PROCEDURE — 82570 ASSAY OF URINE CREATININE: CPT

## 2024-04-18 PROCEDURE — 99215 OFFICE O/P EST HI 40 MIN: CPT | Performed by: PHYSICIAN ASSISTANT

## 2024-04-18 PROCEDURE — 80053 COMPREHEN METABOLIC PANEL: CPT

## 2024-04-18 PROCEDURE — G8754 DIAS BP LESS 90: HCPCS | Performed by: PHYSICIAN ASSISTANT

## 2024-04-18 PROCEDURE — 86480 TB TEST CELL IMMUN MEASURE: CPT

## 2024-04-18 PROCEDURE — 36415 COLL VENOUS BLD VENIPUNCTURE: CPT

## 2024-04-18 PROCEDURE — 85025 COMPLETE CBC W/AUTO DIFF WBC: CPT

## 2024-04-18 PROCEDURE — 83036 HEMOGLOBIN GLYCOSYLATED A1C: CPT

## 2024-04-18 PROCEDURE — G8752 SYS BP LESS 140: HCPCS | Performed by: PHYSICIAN ASSISTANT

## 2024-04-18 RX ORDER — GLIMEPIRIDE 4 MG/1
4 TABLET ORAL DAILY
COMMUNITY

## 2024-04-18 RX ORDER — DAPAGLIFLOZIN 10 MG/1
10 TABLET, FILM COATED ORAL DAILY
COMMUNITY

## 2024-04-18 ASSESSMENT — ENCOUNTER SYMPTOMS
PALPITATIONS: 0
WHEEZING: 0
NAUSEA: 0
CHEST TIGHTNESS: 0
NUMBNESS: 1
SORE THROAT: 0
DIARRHEA: 0
SHORTNESS OF BREATH: 0
ABDOMINAL PAIN: 0
SINUS PRESSURE: 0
VOMITING: 0
COUGH: 0
CONSTIPATION: 0

## 2024-04-18 NOTE — ASSESSMENT & PLAN NOTE
Limited mobility. Will need assistance with activities. Discussed fall prevention. Uses electric wheelchair.

## 2024-04-18 NOTE — LETTER
2024    Mitchell Sauceda        Patient:           Mitchell Sauceda  :              1950  Date of Visit:  2024        Bariatric hospital bed    Dx: morbid obesity E66.01  Gait dysfunction R26.89  Type 2 diabetes mellitus with ulcer E11.621                        ANKITA Lane C

## 2024-04-18 NOTE — ASSESSMENT & PLAN NOTE
Worsened. Discussed that we will need to check kidney function on labs prior to increasing gabapentin.

## 2024-04-18 NOTE — PROGRESS NOTES
[facility] in [place]         Mitchell Sauceda is a 73 y.o. male who presents for follow up.     He states that he is following up with wound care for left foot wound/ulcer. He states that it is not full healed.     He states that his previous pain management provider is gone. He is taking the gabapentin 3 times a day. He would like something for pain.      He follows up with endocrinology. He does not know what his last hemoglobin A1c was.     He follows up with cardiology.           Past Medical History:   Diagnosis Date    Arthritis     GERD (gastroesophageal reflux disease)     Hypertension     Lipid disorder     Neuropathy     Persistent atrial fibrillation (CMS/[facility]) 03/19/2019    Primary osteoarthritis involving multiple joints 05/15/2019    Pulmonary embolism (CMS/[facility])     Type 2 diabetes mellitus (CMS/[facility])     Type 2 diabetes mellitus with hypoglycemia (CMS/[facility]) 10/24/2013    Overview:     Wound healing, delayed        Past Surgical History:   Procedure Laterality Date    KNEE ARTHROSCOPY Bilateral     VEIN SURGERY Right        Social History     Tobacco Use    Smoking status: Never    Smokeless tobacco: Never   Substance Use Topics    Alcohol use: No       Family History   Problem Relation Age of Onset    Factor V Leiden deficiency Biological Sister     Factor V Leiden deficiency Nephew        Vancomycin and Prednisone    Current Outpatient Medications   Medication Sig Dispense Refill    ascorbic acid (VITAMIN C) 500 mg tablet Take 1 tablet by mouth daily.      atorvastatin (LIPITOR) 20 mg tablet Take 1 tablet (20 mg total) by mouth daily. 90 tablet 3    bumetanide (BUMEX) 1 mg tablet Take 2 mg by mouth daily.      gabapentin (NEURONTIN) 300 mg capsule Take 1 capsule (300 mg total) by mouth 2 (two) times a day. 180 capsule 0    lancets misc for blood glucose monitoring 3x day      lisinopril (PRINIVIL) 2.5 mg tablet Take 1 tablet (2.5 mg total) by mouth daily. 90  tablet 3    miscellaneous medical supply misc Bariatric Motorized Wheelchair Repair. Dx: I50.22, M15.9, E11.621, L97.429, E66.01, R26.2 1 each 0    pantoprazole (PROTONIX) 40 mg EC tablet TAKE 1 TABLET BY MOUTH EVERY DAY 90 tablet 3    PEN NEEDLE, DIABETIC (BD ULTRA-FINE MICRO PEN NEEDLE MISC) once daily use with Levemir      rivaroxaban (XARELTO) 20 mg tablet Take 1 tablet (20 mg total) by mouth daily with dinner. 90 tablet 3    semaglutide (OZEMPIC) 1 mg/dose (2 mg/1.5 mL) subcutaneous injection Inject 1 mg under the skin every (seven) 7 days.      collagenase (SANTYL) ointment Apply 1 Application topically daily. 30 g 0    cyanocobalamin (VITAMIN B12) 1,000 mcg tablet Take 1 tablet (1,000 mcg total) by mouth daily. 30 tablet 0    FARXIGA 10 mg tablet tablet Take 10 mg by mouth daily.      flash glucose scanning reader (FREESTYLE BURT 2 READER) [facility] Check BG as directed. Change every 2 weeks. DX: Z79.4, E 13.40 1 each 0    flash glucose sensor (FREESTYLE BURT 2 SENSOR) kit Check BG as directed DX: Z79.4, E 13.40 6 kit 1    glimepiride (AMARYL) 4 mg tablet Take 4 mg by mouth daily.      insulin lispro protamine-lispro, 75/25, (HumaLOG Mix 75-25 KwikPen) 100 unit/mL (75-25) pen Inject 20 Units under the skin 2 (two) times a day. 12 mL 0    KLOR-CON M20 20 mEq CR tablet TAKE 1 TABLET BY MOUTH 2 TIMES A DAY. 180 tablet 0     No current facility-administered medications for this visit.       Review of Systems   HENT:  Negative for ear pain, sinus pressure and sore throat.    Respiratory:  Negative for cough, chest tightness, shortness of breath and wheezing.    Cardiovascular:  Negative for chest pain, palpitations and leg swelling.   Gastrointestinal:  Negative for abdominal pain, constipation, diarrhea, nausea and vomiting.   Neurological:  Positive for numbness.       Objective   Vitals:    04/18/24 1126   BP: 138/74   BP Location: Left upper arm   Patient Position: Sitting   Pulse: 60   Resp: 16   Temp: 37.1 °C  (98.8 °F)   TempSrc: Temporal   SpO2: 98%   Weight: (!) 137 kg (302 lb)   Height: 1.829 m (6')     Body mass index is 40.96 kg/m².    Physical Exam  Constitutional:       Appearance: Normal appearance. He is obese.   HENT:      Head: Normocephalic and atraumatic.      Right Ear: External ear normal.      Left Ear: External ear normal.      Nose: Nose normal.   Cardiovascular:      Rate and Rhythm: Normal rate and regular rhythm.      Heart sounds: Normal heart sounds.   Pulmonary:      Effort: Pulmonary effort is normal.      Breath sounds: Normal breath sounds.   Musculoskeletal:      Right lower leg: Edema present.      Left lower leg: Edema present.      Comments: Electric wheelchair   Neurological:      General: No focal deficit present.      Mental Status: He is alert.   Psychiatric:         Mood and Affect: Mood normal.         Behavior: Behavior normal.         Lab Results   Component Value Date    WBC 4.85 09/13/2023    HGB 11.7 (L) 09/13/2023    HCT 36.1 (L) 09/13/2023     (L) 09/13/2023    CHOL 137 06/13/2019    TRIG 99 06/13/2019    HDL 41 06/13/2019    ALT 13 09/10/2023    AST 17 09/10/2023     10/11/2023    K 4.5 10/11/2023     10/11/2023    CREATININE 1.58 (H) 10/11/2023    BUN 19 10/11/2023    CO2 27 10/11/2023    TSH 2.49 07/10/2018    PSA 0.58 01/31/2022    INR 2.8 (H) 04/17/2015    HGBA1C 7.4 05/19/2023    MICROALBUR 1.1 06/13/2019         Assessment   Problem List Items Addressed This Visit       Neuropathy     Worsened. Discussed that we will need to check kidney function on labs prior to increasing gabapentin.          Mixed hyperlipidemia     Continue atorvastatin. Check lipid panel         Relevant Orders    Lipid panel    Diabetes mellitus (CMS/[facility]) - Primary     Check hemoglobin A1c on labs. Continue glimepiride, ozempic, and insulin per endocrinology.         Relevant Medications    glimepiride (AMARYL) 4 mg tablet    FARXIGA 10 mg tablet tablet    Other Relevant Orders     Comprehensive metabolic panel    Hemoglobin A1c    Microalbumin/Creatinine Ur Random    Morbid obesity (CMS/HCC)     Limited mobility. Will need assistance with activities. Discussed fall prevention. Uses electric wheelchair.          Persistent atrial fibrillation (CMS/HCC)     Continue xarelto and follow up with cardiology.          Chronic systolic congestive heart failure (CMS/HCC)     Continue bumex, lisinopril. Follow up with cardiology.          Diabetic ulcer of left heel associated with type 2 diabetes mellitus (CMS/HCC)     Continue follow up with wound care. Home nurse for wound care         Relevant Medications    glimepiride (AMARYL) 4 mg tablet    FARXIGA 10 mg tablet tablet    Chronic kidney disease, stage 4 (severe) (CMS/HCC)     Check updated renal function on labs.          Atherosclerosis of aorta (CMS/HCC)     Noted on CXR. Continue atorvastatin         Platelets decreased (CMS/HCC)     Check on repeat CBC          Other Visit Diagnoses       Healthcare maintenance        Relevant Orders    CBC and Differential    Comprehensive metabolic panel    Tuberculosis screening        Relevant Orders    QuantiFERON-TB           Time spent on encounter: 50 minutes.     ANKITA Lane  4/18/2024

## 2024-04-19 DIAGNOSIS — E87.5 HYPERKALEMIA: ICD-10-CM

## 2024-04-19 DIAGNOSIS — N18.4 CHRONIC KIDNEY DISEASE, STAGE 4 (SEVERE) (CMS/HCC): ICD-10-CM

## 2024-04-19 DIAGNOSIS — N17.9 AKI (ACUTE KIDNEY INJURY) (CMS/HCC): Primary | ICD-10-CM

## 2024-04-22 ENCOUNTER — TELEPHONE (OUTPATIENT)
Dept: FAMILY MEDICINE | Facility: CLINIC | Age: 74
End: 2024-04-22
Payer: MEDICARE

## 2024-04-22 DIAGNOSIS — N18.4 CHRONIC KIDNEY DISEASE, STAGE 4 (SEVERE) (CMS/HCC): ICD-10-CM

## 2024-04-22 DIAGNOSIS — R76.11 ABNORMAL REACTION TO TUBERCULIN TEST: ICD-10-CM

## 2024-04-22 DIAGNOSIS — Z11.1 TUBERCULOSIS SCREENING: Primary | ICD-10-CM

## 2024-04-22 DIAGNOSIS — E87.5 HYPERKALEMIA: ICD-10-CM

## 2024-04-22 LAB
M TB IFN-G BLD-IMP: ABNORMAL
MITOGEN-NIL: 0.27 IU/ML
NIL: 0.02 IU/ML
TB AG-NIL: 0 IU/ML
TB2 AG - NIL: 0 IU/ML

## 2024-04-22 NOTE — TELEPHONE ENCOUNTER
Please notify patient/patient's daughter that his TB test came back indeterminate. Unfortunately he will need additional testing. I can repeat the blood test but if it is the same he will have to do a PPD. If they want to save time for paperwork then we can put him on the diagnostic schedule for the PPD. We just need to know if they would like to repeat blood test first or come in for skin test and will have to return in 2 days for it to be read.

## 2024-04-23 ENCOUNTER — CLINICAL SUPPORT (OUTPATIENT)
Dept: FAMILY MEDICINE | Facility: CLINIC | Age: 74
End: 2024-04-23
Payer: MEDICARE

## 2024-04-23 DIAGNOSIS — N18.4 CHRONIC KIDNEY DISEASE, STAGE 4 (SEVERE) (CMS/HCC): ICD-10-CM

## 2024-04-23 DIAGNOSIS — E87.5 HYPERKALEMIA: ICD-10-CM

## 2024-04-23 LAB
ANION GAP SERPL CALC-SCNC: 10 MEQ/L (ref 3–15)
BUN SERPL-MCNC: 36 MG/DL (ref 7–25)
CALCIUM SERPL-MCNC: 8.7 MG/DL (ref 8.6–10.3)
CHLORIDE SERPL-SCNC: 102 MEQ/L (ref 98–107)
CO2 SERPL-SCNC: 26 MEQ/L (ref 21–31)
CREAT SERPL-MCNC: 2.8 MG/DL (ref 0.7–1.3)
EGFRCR SERPLBLD CKD-EPI 2021: 22.8 ML/MIN/1.73M*2
GLUCOSE SERPL-MCNC: 201 MG/DL (ref 70–99)
POTASSIUM SERPL-SCNC: 4.4 MEQ/L (ref 3.5–5.1)
SODIUM SERPL-SCNC: 138 MEQ/L (ref 136–145)

## 2024-04-23 PROCEDURE — 80048 BASIC METABOLIC PNL TOTAL CA: CPT | Performed by: PHYSICIAN ASSISTANT

## 2024-04-23 PROCEDURE — 86580 TB INTRADERMAL TEST: CPT | Performed by: PHYSICIAN ASSISTANT

## 2024-04-25 ENCOUNTER — TELEPHONE (OUTPATIENT)
Dept: FAMILY MEDICINE | Facility: CLINIC | Age: 74
End: 2024-04-25
Payer: MEDICARE

## 2024-04-25 ENCOUNTER — PATIENT OUTREACH (OUTPATIENT)
Dept: CASE MANAGEMENT | Facility: CLINIC | Age: 74
End: 2024-04-25
Payer: MEDICARE

## 2024-04-25 NOTE — TELEPHONE ENCOUNTER
Fax and scanned [place] Assessment form and results from PPD to [facility] at [phone]    Called and spoke to Kath(daughter),let her know I scanned and faxed Physical form and PPD t [place] in [place]

## 2024-04-25 NOTE — PROGRESS NOTES
Social Work follow up note:    [facility] TEMITOPEW called pt.'s adult daughter, Kath, to commend her for her quick action to get her parents into [facility] [place], [place] close to her home. PCP office faxed over all forms needed. Pt had his PPD test and BMP drawn on April 22nd  [facility] TEMITOPEW wished them well in this next chapter.  [facility] TEMITOPEW to close case since pt. will move out of state near his adult daughter and will no longer have a [facility] PCP.    Catherine Bañuelos, TANYA  [phone]

## 2024-05-02 ENCOUNTER — EXTERNAL LAB (OUTPATIENT)
Dept: HEALTH INFORMATION MANAGEMENT | Facility: OTHER | Age: 74
End: 2024-05-02

## 2024-05-02 LAB — HBA1C MFR BLD: 7.2 % (ref 4–5.6)

## 2024-05-03 ENCOUNTER — EXTERNAL LAB (OUTPATIENT)
Dept: HEALTH INFORMATION MANAGEMENT | Facility: OTHER | Age: 74
End: 2024-05-03

## 2024-05-03 LAB
APPEARANCE UR: ABNORMAL
BACTERIA #/AREA URNS HPF: ABNORMAL /[HPF]
BILIRUB UR QL: ABNORMAL MG/DL
COLOR UR: YELLOW
CREAT UR-MCNC: 123.48 MG/DL (ref 20–370)
GLUCOSE UR-MCNC: 4 MG/DL
HGB UR QL: ABNORMAL MG/DL
KETONES UR-MCNC: ABNORMAL MG/DL
LEUKOCYTE ESTERASE UR QL STRIP: 500
MUCOUS THREADS URNS QL MICRO: ABNORMAL HPF
NITRITE UR QL: ABNORMAL
PH UR: 5.5 [PH] (ref 5–7.5)
PROT UR QL: 1
PROT UR-MCNC: 86.96 MG/DL
PROT/CREAT UR: 0.54 MG/MG (ref 0–0.18)
RBC #/AREA URNS HPF: >50 HPF (ref 0–2)
SP GR UR: 1.02 (ref 1–1.03)
SQUAMOUS #/AREA URNS HPF: ABNORMAL HPF
UROBILINOGEN UR QL: NORMAL MG/DL
WBC #/AREA URNS HPF: >50 HPF (ref 0–5)
WBC CLUMPS: 13 HPF

## 2024-05-09 ENCOUNTER — EXTERNAL LAB (OUTPATIENT)
Dept: HEALTH INFORMATION MANAGEMENT | Facility: OTHER | Age: 74
End: 2024-05-09

## 2024-05-09 LAB
BUN SERPL-MCNC: 35.9 MG/DL (ref 8–24)
BUN/CREAT SERPL: 14 RATIO (ref 8–25)
CALCIUM SERPL-MCNC: 8.7 MG/DL (ref 8.7–10.4)
CHLORIDE SERPL-SCNC: 102.5 MEQ/L (ref 98–107)
CO2 SERPL-SCNC: 26.9 MEQ/L (ref 21–31)
CREAT SERPL-MCNC: 2.56 MG/DL (ref 0.7–1.3)
GFR SERPLBLD SCHWARTZ-ARVRAT: 25 ML/MIN/{1.73_M2}
GLUCOSE SERPL-MCNC: 141.1 MG/DL (ref 70–105)
LAB RESULT: NORMAL
LENGTH OF FAST TIME PATIENT: ABNORMAL H
POTASSIUM SERPL-SCNC: 4.4 MMOL/L (ref 3.5–5.5)
SODIUM SERPL-SCNC: 141.7 MMOL/L (ref 135–145)

## 2024-05-15 ENCOUNTER — TELEPHONE (OUTPATIENT)
Dept: CARDIOLOGY | Age: 74
End: 2024-05-15

## 2024-05-17 ENCOUNTER — TELEPHONE (OUTPATIENT)
Dept: CARDIOLOGY | Age: 74
End: 2024-05-17

## 2024-06-03 ENCOUNTER — EXTERNAL LAB (OUTPATIENT)
Dept: HEALTH INFORMATION MANAGEMENT | Facility: OTHER | Age: 74
End: 2024-06-03

## 2024-06-03 LAB
BASOPHILS # BLD: 0.03 10E3/UL (ref 0.01–0.08)
BASOPHILS NFR BLD: 0.6 % (ref 0.2–1.2)
BUN SERPL-MCNC: 29.4 MG/DL (ref 8–24)
BUN/CREAT SERPL: 13 RATIO (ref 8–25)
CALCIUM SERPL-MCNC: 8.4 MG/DL (ref 8.7–10.4)
CHLORIDE SERPL-SCNC: 98.5 MEQ/L (ref 98–107)
CO2 SERPL-SCNC: 24.8 MEQ/L (ref 21–31)
CREAT SERPL-MCNC: 2.31 MG/DL (ref 0.7–1.3)
EOSINOPHIL # BLD: 0.13 10E3/UL (ref 0.04–0.54)
EOSINOPHIL NFR BLD: 2.5 % (ref 0.8–7)
ERYTHROCYTE [DISTWIDTH] IN BLOOD: 13.1 % (ref 11–16)
FERRITIN SERPL-MCNC: 117.6 NG/DL (ref 11–336.2)
GFR SERPLBLD SCHWARTZ-ARVRAT: 29 ML/MIN/{1.73_M2}
GLUCOSE SERPL-MCNC: 171.1 MG/DL (ref 70–105)
HCT VFR BLD CALC: 35.4 % (ref 34.2–51.3)
HGB BLD-MCNC: 11.5 G/DL (ref 13.7–17.5)
IRON SERPL-MCNC: 54.8 UG/DL (ref 50–150)
LENGTH OF FAST TIME PATIENT: ABNORMAL H
LYMPHOCYTES # BLD: 0.47 10E3/UL (ref 1.32–3.57)
LYMPHOCYTES NFR BLD: 9 % (ref 21.8–53.1)
MAGNESIUM SERPL-MCNC: 2.1 MG/DL (ref 1.6–2.8)
MCH RBC QN AUTO: 29.6 PG (ref 25.7–32.2)
MCHC RBC AUTO-ENTMCNC: 32.5 G/DL (ref 29–34.3)
MCV RBC AUTO: 91 FL (ref 76.2–99.6)
MONOCYTES # BLD: 0.49 10E3/UL (ref 0.3–0.82)
MONOCYTES NFR BLD: 9.3 % (ref 5.3–12.2)
NEUTROPHILS # BLD: 4.11 10E3/UL (ref 1.7–5.38)
NEUTROPHILS NFR BLD: 78.2 % (ref 34–67.9)
PHOSPHATE SERPL-MCNC: 4 MG/DL (ref 2.5–4.8)
PLATELET # BLD: 185 10E3/UL (ref 150–450)
POTASSIUM SERPL-SCNC: 4.34 MMOL/L (ref 3.5–5.5)
RBC # BLD: 3.89 10E6/UL (ref 4.63–6.08)
SODIUM SERPL-SCNC: 136.1 MMOL/L (ref 136–145)
T3 SERPL-MCNC: 0.85 NG/DL (ref 0.71–1.8)
TIBC SERPL-MCNC: 205.21 MCG/DL (ref 250–400)
TRANSFERRIN SERPL-MCNC: 143.5 MG/DL (ref 180–382)
TSH SERPL-ACNC: 1.27 IU/ML (ref 0.34–5.6)
WBC # BLD: 5.25 10E3/UL (ref 4.23–9.07)

## 2024-07-01 ENCOUNTER — EXTERNAL LAB (OUTPATIENT)
Dept: HEALTH INFORMATION MANAGEMENT | Facility: OTHER | Age: 74
End: 2024-07-01

## 2024-07-01 LAB
BASOPHILS # BLD: 0.02 10E3/UL (ref 0.01–0.08)
BASOPHILS NFR BLD: 0.4 % (ref 0.2–1.2)
BUN SERPL-MCNC: 38.4 MG/DL (ref 8–24)
BUN/CREAT SERPL: 15 RATIO (ref 8–25)
CALCIUM SERPL-MCNC: 8.6 MG/DL (ref 8.7–10.4)
CHLORIDE SERPL-SCNC: 100.9 MEQ/L (ref 98–107)
CO2 SERPL-SCNC: 27 MEQ/L (ref 21–31)
CREAT SERPL-MCNC: 2.57 MG/DL (ref 0.7–1.3)
CRP SERPL HS-MCNC: 5.09 MG/L (ref 0–3)
EOSINOPHIL # BLD: 0.11 10E3/UL (ref 0.04–0.54)
EOSINOPHIL NFR BLD: 2.1 % (ref 0.8–7)
ERYTHROCYTE [DISTWIDTH] IN BLOOD: 12.9 % (ref 11–16)
ERYTHROCYTE [SEDIMENTATION RATE] IN BLOOD BY WESTERGREN METHOD: 93 MM/HR (ref 0–15)
FERRITIN SERPL-MCNC: 96.1 NG/DL (ref 11–336.2)
GFR SERPLBLD SCHWARTZ-ARVRAT: 25 ML/MIN/{1.73_M2}
GLUCOSE SERPL-MCNC: 252.7 MG/DL (ref 70–105)
HCT VFR BLD CALC: 34.3 % (ref 34.2–51.3)
HGB BLD-MCNC: 11.1 G/DL (ref 13.7–17.5)
IRON SERPL-MCNC: 47 UG/DL (ref 50–150)
LENGTH OF FAST TIME PATIENT: ABNORMAL H
LYMPHOCYTES # BLD: 0.3 10E3/UL (ref 1.32–3.57)
LYMPHOCYTES NFR BLD: 5.8 % (ref 21.8–53.1)
MCH RBC QN AUTO: 29.8 PG (ref 25.7–32.2)
MCHC RBC AUTO-ENTMCNC: 32.4 G/DL (ref 29–34.3)
MCV RBC AUTO: 92 FL (ref 76.2–99.6)
MONOCYTES # BLD: 0.53 10E3/UL (ref 0.3–0.82)
MONOCYTES NFR BLD: 10.3 % (ref 5.3–12.2)
NEUTROPHILS # BLD: 4.17 10E3/UL (ref 1.78–5.38)
NEUTROPHILS NFR BLD: 81.2 % (ref 34–67.9)
PLATELET # BLD: 149 10E3/UL (ref 150–450)
POTASSIUM SERPL-SCNC: 4.5 MMOL/L (ref 3.5–5.5)
RBC # BLD: 3.73 10E6/UL (ref 4.63–6.08)
SODIUM SERPL-SCNC: 138.7 MMOL/L (ref 135–145)
TIBC SERPL-MCNC: 205.06 MCG/DL (ref 250–400)
TRANSFERRIN SERPL-MCNC: 143.4 MG/DL (ref 180–382)
WBC # BLD: 5.14 10E3/UL (ref 4.23–9.07)

## 2024-07-12 ENCOUNTER — APPOINTMENT (OUTPATIENT)
Dept: PULMONOLOGY | Age: 74
End: 2024-07-12

## 2024-07-22 ENCOUNTER — EXTERNAL RECORD (OUTPATIENT)
Dept: HEALTH INFORMATION MANAGEMENT | Facility: OTHER | Age: 74
End: 2024-07-22

## 2024-07-25 ENCOUNTER — APPOINTMENT (OUTPATIENT)
Dept: CARDIOLOGY | Age: 74
End: 2024-07-25

## 2024-07-25 VITALS
OXYGEN SATURATION: 97 % | TEMPERATURE: 97 F | SYSTOLIC BLOOD PRESSURE: 108 MMHG | HEART RATE: 60 BPM | BODY MASS INDEX: 38.14 KG/M2 | DIASTOLIC BLOOD PRESSURE: 58 MMHG | HEIGHT: 72 IN | WEIGHT: 281.6 LBS

## 2024-07-25 DIAGNOSIS — Z91.89 SEDENTARY LIFESTYLE: ICD-10-CM

## 2024-07-25 DIAGNOSIS — E78.5 DYSLIPIDEMIA, GOAL LDL BELOW 70: ICD-10-CM

## 2024-07-25 DIAGNOSIS — Z86.718 HISTORY OF DVT (DEEP VEIN THROMBOSIS): ICD-10-CM

## 2024-07-25 DIAGNOSIS — I48.20 CHRONIC ATRIAL FIBRILLATION  (CMD): ICD-10-CM

## 2024-07-25 DIAGNOSIS — I15.1 HYPERTENSION SECONDARY TO OTHER RENAL DISORDERS: ICD-10-CM

## 2024-07-25 DIAGNOSIS — N28.9 RENAL INSUFFICIENCY: ICD-10-CM

## 2024-07-25 DIAGNOSIS — Z95.0 PRESENCE OF CARDIAC PACEMAKER: ICD-10-CM

## 2024-07-25 DIAGNOSIS — Z87.891 FORMER SMOKER: ICD-10-CM

## 2024-07-25 DIAGNOSIS — E78.5 HYPERLIPIDEMIA, UNSPECIFIED HYPERLIPIDEMIA TYPE: ICD-10-CM

## 2024-07-25 DIAGNOSIS — I10 HYPERTENSION, UNSPECIFIED TYPE: Primary | ICD-10-CM

## 2024-07-25 LAB
ATRIAL RATE (BPM): 227
QRS-INTERVAL (MSEC): 152
QT-INTERVAL (MSEC): 500
QTC: 500
R AXIS (DEGREES): -85
REPORT TEXT: NORMAL
T AXIS (DEGREES): 94
VENTRICULAR RATE EKG/MIN (BPM): 60

## 2024-07-25 PROCEDURE — 93000 ELECTROCARDIOGRAM COMPLETE: CPT | Performed by: INTERNAL MEDICINE

## 2024-07-25 PROCEDURE — 99205 OFFICE O/P NEW HI 60 MIN: CPT | Performed by: INTERNAL MEDICINE

## 2024-07-25 PROCEDURE — G2211 COMPLEX E/M VISIT ADD ON: HCPCS | Performed by: INTERNAL MEDICINE

## 2024-07-25 RX ORDER — NYSTATIN 100000 U/G
CREAM TOPICAL 2 TIMES DAILY
COMMUNITY

## 2024-07-25 RX ORDER — UREA 10 %
1 LOTION (ML) TOPICAL 2 TIMES DAILY
COMMUNITY

## 2024-07-25 RX ORDER — DOCUSATE SODIUM 100 MG/1
100 CAPSULE, LIQUID FILLED ORAL DAILY
COMMUNITY

## 2024-07-25 RX ORDER — PANTOPRAZOLE SODIUM 40 MG/1
40 TABLET, DELAYED RELEASE ORAL DAILY
COMMUNITY

## 2024-07-25 ASSESSMENT — PATIENT HEALTH QUESTIONNAIRE - PHQ9
SUM OF ALL RESPONSES TO PHQ9 QUESTIONS 1 AND 2: 1
2. FEELING DOWN, DEPRESSED OR HOPELESS: SEVERAL DAYS
SUM OF ALL RESPONSES TO PHQ9 QUESTIONS 1 AND 2: 1
CLINICAL INTERPRETATION OF PHQ2 SCORE: NO FURTHER SCREENING NEEDED
1. LITTLE INTEREST OR PLEASURE IN DOING THINGS: NOT AT ALL

## 2024-07-28 PROBLEM — Z95.0 PRESENCE OF CARDIAC PACEMAKER: Status: ACTIVE | Noted: 2024-07-28

## 2024-07-28 PROBLEM — Z91.89 SEDENTARY LIFESTYLE: Status: ACTIVE | Noted: 2024-07-28

## 2024-07-28 PROBLEM — N28.9 RENAL INSUFFICIENCY: Status: ACTIVE | Noted: 2024-07-28

## 2024-07-28 PROBLEM — Z86.718 HISTORY OF DVT (DEEP VEIN THROMBOSIS): Status: ACTIVE | Noted: 2024-07-28

## 2024-07-28 PROBLEM — I48.20 CHRONIC ATRIAL FIBRILLATION  (CMD): Status: ACTIVE | Noted: 2024-07-28

## 2024-07-28 PROBLEM — Z87.891 FORMER SMOKER: Status: ACTIVE | Noted: 2024-07-28

## 2024-07-28 PROBLEM — E78.5 DYSLIPIDEMIA, GOAL LDL BELOW 70: Status: ACTIVE | Noted: 2024-07-28

## 2024-07-28 PROBLEM — I15.1 HYPERTENSION SECONDARY TO OTHER RENAL DISORDERS: Status: ACTIVE | Noted: 2024-07-28

## 2024-07-28 ASSESSMENT — ENCOUNTER SYMPTOMS
ABDOMINAL DISTENTION: 0
CONFUSION: 0
CHEST TIGHTNESS: 0
SPEECH DIFFICULTY: 0
WEAKNESS: 0
VOMITING: 0
CONSTIPATION: 0
HEADACHES: 0
WHEEZING: 0
NUMBNESS: 0
LIGHT-HEADEDNESS: 0
EYE DISCHARGE: 0
APPETITE CHANGE: 0
APNEA: 0
HALLUCINATIONS: 0
BLOOD IN STOOL: 0
SLEEP DISTURBANCE: 0
UNEXPECTED WEIGHT CHANGE: 0
FACIAL SWELLING: 0
SINUS PAIN: 0
BACK PAIN: 0
COLOR CHANGE: 0
DIARRHEA: 0
FACIAL ASYMMETRY: 0
ABDOMINAL PAIN: 0
SHORTNESS OF BREATH: 1
EYE PAIN: 0
FEVER: 0
ACTIVITY CHANGE: 0

## 2024-07-29 ENCOUNTER — EXTERNAL LAB (OUTPATIENT)
Dept: HEALTH INFORMATION MANAGEMENT | Facility: OTHER | Age: 74
End: 2024-07-29

## 2024-07-29 LAB
ATRIAL RATE (BPM): 227
HBA1C MFR BLD: 10.6 % (ref 4–5.6)
QRS-INTERVAL (MSEC): 152
QT-INTERVAL (MSEC): 500
QTC: 500
R AXIS (DEGREES): -85
REPORT TEXT: NORMAL
T AXIS (DEGREES): 94
VENTRICULAR RATE EKG/MIN (BPM): 60

## 2024-07-30 ENCOUNTER — EXTERNAL RECORD (OUTPATIENT)
Dept: HEALTH INFORMATION MANAGEMENT | Facility: OTHER | Age: 74
End: 2024-07-30

## 2024-08-30 ENCOUNTER — HOSPITAL ENCOUNTER (EMERGENCY)
Age: 74
Discharge: HOME | End: 2024-08-30
Attending: STUDENT IN AN ORGANIZED HEALTH CARE EDUCATION/TRAINING PROGRAM

## 2024-08-30 ENCOUNTER — APPOINTMENT (OUTPATIENT)
Dept: CARDIOLOGY | Age: 74
End: 2024-08-30

## 2024-08-30 ENCOUNTER — APPOINTMENT (OUTPATIENT)
Dept: GENERAL RADIOLOGY | Age: 74
End: 2024-08-30
Attending: STUDENT IN AN ORGANIZED HEALTH CARE EDUCATION/TRAINING PROGRAM

## 2024-08-30 VITALS
TEMPERATURE: 96.8 F | OXYGEN SATURATION: 98 % | SYSTOLIC BLOOD PRESSURE: 99 MMHG | DIASTOLIC BLOOD PRESSURE: 67 MMHG | HEART RATE: 60 BPM | RESPIRATION RATE: 16 BRPM

## 2024-08-30 VITALS
BODY MASS INDEX: 39.21 KG/M2 | RESPIRATION RATE: 16 BRPM | SYSTOLIC BLOOD PRESSURE: 124 MMHG | DIASTOLIC BLOOD PRESSURE: 62 MMHG | WEIGHT: 289.46 LBS | TEMPERATURE: 96.6 F | OXYGEN SATURATION: 98 % | HEART RATE: 80 BPM | HEIGHT: 72 IN

## 2024-08-30 DIAGNOSIS — I48.20 CHRONIC ATRIAL FIBRILLATION  (CMD): Primary | ICD-10-CM

## 2024-08-30 DIAGNOSIS — S81.811A LACERATION OF RIGHT LOWER LEG, INITIAL ENCOUNTER: Primary | ICD-10-CM

## 2024-08-30 DIAGNOSIS — E78.5 DYSLIPIDEMIA, GOAL LDL BELOW 70: ICD-10-CM

## 2024-08-30 DIAGNOSIS — Z95.0 PRESENCE OF CARDIAC PACEMAKER: ICD-10-CM

## 2024-08-30 DIAGNOSIS — I15.1 HYPERTENSION SECONDARY TO OTHER RENAL DISORDERS: ICD-10-CM

## 2024-08-30 LAB
ABO + RH BLD: NORMAL
ALBUMIN SERPL-MCNC: 3.3 G/DL (ref 3.6–5.1)
ALBUMIN/GLOB SERPL: 0.7 {RATIO} (ref 1–2.4)
ALP SERPL-CCNC: 121 UNITS/L (ref 45–117)
ALT SERPL-CCNC: 14 UNITS/L
ANION GAP SERPL CALC-SCNC: 10 MMOL/L (ref 7–19)
APTT PPP: 34 SEC (ref 22–32)
AST SERPL-CCNC: 12 UNITS/L
ATRIAL RATE (BPM): 326
BASOPHILS # BLD: 0 K/MCL (ref 0–0.3)
BASOPHILS NFR BLD: 0 %
BILIRUB SERPL-MCNC: 0.6 MG/DL (ref 0.2–1)
BLD GP AB SCN SERPL QL GEL: NEGATIVE
BUN SERPL-MCNC: 40 MG/DL (ref 6–20)
BUN/CREAT SERPL: 15 (ref 7–25)
CALCIUM SERPL-MCNC: 9.2 MG/DL (ref 8.4–10.2)
CHLORIDE SERPL-SCNC: 102 MMOL/L (ref 97–110)
CO2 SERPL-SCNC: 27 MMOL/L (ref 21–32)
CREAT SERPL-MCNC: 2.62 MG/DL (ref 0.67–1.17)
DEPRECATED RDW RBC: 45.4 FL (ref 39–50)
EGFRCR SERPLBLD CKD-EPI 2021: 25 ML/MIN/{1.73_M2}
EOSINOPHIL # BLD: 0.1 K/MCL (ref 0–0.5)
EOSINOPHIL NFR BLD: 2 %
ERYTHROCYTE [DISTWIDTH] IN BLOOD: 13.3 % (ref 11–15)
FASTING DURATION TIME PATIENT: ABNORMAL H
GLOBULIN SER-MCNC: 4.7 G/DL (ref 2–4)
GLUCOSE BLDC GLUCOMTR-MCNC: 204 MG/DL (ref 70–99)
GLUCOSE SERPL-MCNC: 226 MG/DL (ref 70–99)
HCT VFR BLD CALC: 32.4 % (ref 39–51)
HCT VFR BLD CALC: 35.9 % (ref 39–51)
HGB BLD-MCNC: 10.6 G/DL (ref 13–17)
HGB BLD-MCNC: 11.6 G/DL (ref 13–17)
IMM GRANULOCYTES # BLD AUTO: 0 K/MCL (ref 0–0.2)
IMM GRANULOCYTES # BLD: 0 %
INR PPP: 1.3
LYMPHOCYTES # BLD: 0.4 K/MCL (ref 1–4)
LYMPHOCYTES NFR BLD: 8 %
MCH RBC QN AUTO: 30.3 PG (ref 26–34)
MCHC RBC AUTO-ENTMCNC: 32.3 G/DL (ref 32–36.5)
MCV RBC AUTO: 93.7 FL (ref 78–100)
MONOCYTES # BLD: 0.5 K/MCL (ref 0.3–0.9)
MONOCYTES NFR BLD: 9 %
NEUTROPHILS # BLD: 4 K/MCL (ref 1.8–7.7)
NEUTROPHILS NFR BLD: 81 %
NRBC BLD MANUAL-RTO: 0 /100 WBC
PLATELET # BLD AUTO: 188 K/MCL (ref 140–450)
POTASSIUM SERPL-SCNC: 4.7 MMOL/L (ref 3.4–5.1)
PROT SERPL-MCNC: 8 G/DL (ref 6.4–8.2)
PROTHROMBIN TIME: 14.3 SEC (ref 9.7–11.8)
QRS-INTERVAL (MSEC): 152
QT-INTERVAL (MSEC): 506
QTC: 506
R AXIS (DEGREES): -86
RAINBOW EXTRA TUBES HOLD SPECIMEN: NORMAL
RAINBOW EXTRA TUBES HOLD SPECIMEN: NORMAL
RBC # BLD: 3.83 MIL/MCL (ref 4.5–5.9)
REPORT TEXT: NORMAL
SODIUM SERPL-SCNC: 134 MMOL/L (ref 135–145)
T AXIS (DEGREES): 96
TYPE AND SCREEN EXPIRATION DATE: NORMAL
VENTRICULAR RATE EKG/MIN (BPM): 60
WBC # BLD: 5 K/MCL (ref 4.2–11)

## 2024-08-30 PROCEDURE — 99221 1ST HOSP IP/OBS SF/LOW 40: CPT

## 2024-08-30 PROCEDURE — 10002800 HB RX 250 W HCPCS: Performed by: STUDENT IN AN ORGANIZED HEALTH CARE EDUCATION/TRAINING PROGRAM

## 2024-08-30 PROCEDURE — 90471 IMMUNIZATION ADMIN: CPT | Performed by: STUDENT IN AN ORGANIZED HEALTH CARE EDUCATION/TRAINING PROGRAM

## 2024-08-30 PROCEDURE — 86900 BLOOD TYPING SEROLOGIC ABO: CPT | Performed by: STUDENT IN AN ORGANIZED HEALTH CARE EDUCATION/TRAINING PROGRAM

## 2024-08-30 PROCEDURE — 85014 HEMATOCRIT: CPT | Performed by: STUDENT IN AN ORGANIZED HEALTH CARE EDUCATION/TRAINING PROGRAM

## 2024-08-30 PROCEDURE — 10002803 HB RX 637: Performed by: STUDENT IN AN ORGANIZED HEALTH CARE EDUCATION/TRAINING PROGRAM

## 2024-08-30 PROCEDURE — 85610 PROTHROMBIN TIME: CPT | Performed by: STUDENT IN AN ORGANIZED HEALTH CARE EDUCATION/TRAINING PROGRAM

## 2024-08-30 PROCEDURE — 10002807 HB RX 258: Performed by: STUDENT IN AN ORGANIZED HEALTH CARE EDUCATION/TRAINING PROGRAM

## 2024-08-30 PROCEDURE — 12002 RPR S/N/AX/GEN/TRNK2.6-7.5CM: CPT

## 2024-08-30 PROCEDURE — 13121 CMPLX RPR S/A/L 2.6-7.5 CM: CPT | Performed by: STUDENT IN AN ORGANIZED HEALTH CARE EDUCATION/TRAINING PROGRAM

## 2024-08-30 PROCEDURE — 99284 EMERGENCY DEPT VISIT MOD MDM: CPT | Performed by: STUDENT IN AN ORGANIZED HEALTH CARE EDUCATION/TRAINING PROGRAM

## 2024-08-30 PROCEDURE — 99284 EMERGENCY DEPT VISIT MOD MDM: CPT

## 2024-08-30 PROCEDURE — 85730 THROMBOPLASTIN TIME PARTIAL: CPT | Performed by: STUDENT IN AN ORGANIZED HEALTH CARE EDUCATION/TRAINING PROGRAM

## 2024-08-30 PROCEDURE — 96360 HYDRATION IV INFUSION INIT: CPT

## 2024-08-30 PROCEDURE — 10002801 HB RX 250 W/O HCPCS: Performed by: STUDENT IN AN ORGANIZED HEALTH CARE EDUCATION/TRAINING PROGRAM

## 2024-08-30 PROCEDURE — 85025 COMPLETE CBC W/AUTO DIFF WBC: CPT | Performed by: STUDENT IN AN ORGANIZED HEALTH CARE EDUCATION/TRAINING PROGRAM

## 2024-08-30 PROCEDURE — 80053 COMPREHEN METABOLIC PANEL: CPT | Performed by: STUDENT IN AN ORGANIZED HEALTH CARE EDUCATION/TRAINING PROGRAM

## 2024-08-30 PROCEDURE — 82962 GLUCOSE BLOOD TEST: CPT

## 2024-08-30 PROCEDURE — 90715 TDAP VACCINE 7 YRS/> IM: CPT | Performed by: STUDENT IN AN ORGANIZED HEALTH CARE EDUCATION/TRAINING PROGRAM

## 2024-08-30 PROCEDURE — 73590 X-RAY EXAM OF LOWER LEG: CPT

## 2024-08-30 RX ORDER — CEPHALEXIN 500 MG/1
500 CAPSULE ORAL 4 TIMES DAILY
Qty: 40 CAPSULE | Refills: 0 | Status: SHIPPED | OUTPATIENT
Start: 2024-08-30 | End: 2024-09-09

## 2024-08-30 RX ORDER — CEPHALEXIN 500 MG/1
500 CAPSULE ORAL ONCE
Status: COMPLETED | OUTPATIENT
Start: 2024-08-30 | End: 2024-08-30

## 2024-08-30 RX ORDER — LIDOCAINE HYDROCHLORIDE AND EPINEPHRINE 10; 10 MG/ML; UG/ML
10 INJECTION, SOLUTION INFILTRATION; PERINEURAL ONCE
Status: DISCONTINUED | OUTPATIENT
Start: 2024-08-30 | End: 2024-08-30 | Stop reason: CLARIF

## 2024-08-30 RX ADMIN — LIDOCAINE HYDROCHLORIDE 1 ML: 10; .005 INJECTION, SOLUTION EPIDURAL; INFILTRATION; INTRACAUDAL; PERINEURAL at 18:00

## 2024-08-30 RX ADMIN — TETANUS TOXOID, REDUCED DIPHTHERIA TOXOID AND ACELLULAR PERTUSSIS VACCINE, ADSORBED 0.5 ML: 5; 2.5; 8; 8; 2.5 SUSPENSION INTRAMUSCULAR at 14:50

## 2024-08-30 RX ADMIN — CEPHALEXIN 500 MG: 500 CAPSULE ORAL at 19:23

## 2024-08-30 RX ADMIN — SODIUM CHLORIDE 250 ML: 9 INJECTION, SOLUTION INTRAVENOUS at 19:58

## 2024-08-30 ASSESSMENT — PAIN SCALES - GENERAL: PAINLEVEL_OUTOF10: 5

## 2024-09-10 ENCOUNTER — ANCILLARY PROCEDURE (OUTPATIENT)
Dept: CARDIOLOGY | Age: 74
End: 2024-09-10
Attending: STUDENT IN AN ORGANIZED HEALTH CARE EDUCATION/TRAINING PROGRAM

## 2024-09-10 DIAGNOSIS — Z95.0 PRESENCE OF CARDIAC PACEMAKER: ICD-10-CM

## 2024-09-10 PROCEDURE — 93279 PRGRMG DEV EVAL PM/LDLS PM: CPT | Performed by: STUDENT IN AN ORGANIZED HEALTH CARE EDUCATION/TRAINING PROGRAM

## 2024-09-11 ENCOUNTER — TELEPHONE (OUTPATIENT)
Dept: OTHER | Age: 74
End: 2024-09-11

## 2024-09-12 ENCOUNTER — APPOINTMENT (OUTPATIENT)
Dept: PULMONOLOGY | Age: 74
End: 2024-09-12

## 2024-09-12 VITALS
DIASTOLIC BLOOD PRESSURE: 70 MMHG | TEMPERATURE: 97.9 F | OXYGEN SATURATION: 97 % | HEART RATE: 61 BPM | BODY MASS INDEX: 37.93 KG/M2 | HEIGHT: 72 IN | RESPIRATION RATE: 16 BRPM | SYSTOLIC BLOOD PRESSURE: 119 MMHG | WEIGHT: 280 LBS

## 2024-09-12 DIAGNOSIS — E66.01 TYPE 2 DIABETES MELLITUS WITH MORBID OBESITY  (CMD): ICD-10-CM

## 2024-09-12 DIAGNOSIS — Z87.891 FORMER SMOKER: ICD-10-CM

## 2024-09-12 DIAGNOSIS — Z86.711 HISTORY OF PULMONARY EMBOLISM: Primary | ICD-10-CM

## 2024-09-12 DIAGNOSIS — I48.20 CHRONIC ATRIAL FIBRILLATION  (CMD): ICD-10-CM

## 2024-09-12 DIAGNOSIS — E11.69 TYPE 2 DIABETES MELLITUS WITH MORBID OBESITY  (CMD): ICD-10-CM

## 2024-09-12 ASSESSMENT — PAIN SCALES - GENERAL: PAINLEVEL: 0

## 2024-09-13 ASSESSMENT — ENCOUNTER SYMPTOMS
RESPIRATORY NEGATIVE: 1
CONSTITUTIONAL NEGATIVE: 1

## 2024-12-17 ENCOUNTER — ANCILLARY PROCEDURE (OUTPATIENT)
Dept: CARDIOLOGY | Age: 74
End: 2024-12-17
Attending: STUDENT IN AN ORGANIZED HEALTH CARE EDUCATION/TRAINING PROGRAM

## 2024-12-17 DIAGNOSIS — Z45.018 ENCOUNTER FOR INTERROGATION OF CARDIAC PACEMAKER: ICD-10-CM

## 2024-12-17 LAB
MDC_IDC_LEAD_CONNECTION_STATUS: NORMAL
MDC_IDC_LEAD_IMPLANT_DT: NORMAL
MDC_IDC_LEAD_LOCATION: NORMAL
MDC_IDC_LEAD_LOCATION_DETAIL_1: NORMAL
MDC_IDC_LEAD_MFG: NORMAL
MDC_IDC_LEAD_MODEL: NORMAL
MDC_IDC_LEAD_POLARITY_TYPE: NORMAL
MDC_IDC_LEAD_SERIAL: NORMAL
MDC_IDC_PG_IMPLANT_DTM: NORMAL
MDC_IDC_PG_MFG: NORMAL
MDC_IDC_PG_MODEL: NORMAL
MDC_IDC_PG_SERIAL: NORMAL
MDC_IDC_PG_TYPE: NORMAL
MDC_IDC_SESS_CLINIC_NAME: NORMAL
MDC_IDC_SESS_TYPE: NORMAL

## 2025-02-19 ENCOUNTER — APPOINTMENT (OUTPATIENT)
Dept: CARDIOLOGY | Age: 75
End: 2025-02-19

## 2025-02-20 ENCOUNTER — APPOINTMENT (OUTPATIENT)
Dept: CARDIOLOGY | Age: 75
End: 2025-02-20

## 2025-02-27 ENCOUNTER — EXTERNAL LAB (OUTPATIENT)
Dept: HEALTH INFORMATION MANAGEMENT | Facility: OTHER | Age: 75
End: 2025-02-27

## 2025-02-27 LAB
BASOPHILS # BLD: 0.02 10^3/UL (ref 0.01–0.08)
BASOPHILS NFR BLD: 0.5 % (ref 0.2–1.2)
BUN SERPL-MCNC: 38 MG/DL (ref 7–25)
BUN/CREAT SERPL: 14 RATIO (ref 9–23)
CALCIUM SERPL-MCNC: 8 MG/DL (ref 8.6–10.3)
CHLORIDE SERPL-SCNC: 92 MMOL/L (ref 96–106)
CO2 SERPL-SCNC: 24.5 MEQ/L (ref 21–31)
CREAT SERPL-MCNC: ABNORMAL MG/DL (ref 0.6–1.3)
EOSINOPHIL # BLD: 0.17 10^3/UL (ref 0.04–0.54)
EOSINOPHIL NFR BLD: 4 % (ref 0.8–7)
ERYTHROCYTE [DISTWIDTH] IN BLOOD BY AUTOMATED COUNT: 44.1 FL (ref 35.1–43.9)
ERYTHROCYTE [DISTWIDTH] IN BLOOD: 13.7 % (ref 11.6–14.4)
FERRITIN SERPL-MCNC: 105 NG/ML (ref 24–336)
GFR SERPLBLD SCHWARTZ-ARVRAT: 23 ML/MIN/1.73M2
GLUCOSE SERPL-MCNC: 301 MG/DL (ref 74–109)
HBA1C MFR BLD: 12.9 % (ref 4.8–5.6)
HCT VFR BLD CALC: 34.1 % (ref 40.1–51)
HGB BLD-MCNC: 11.1 G/DL (ref 13.7–17.5)
IMM GRANULOCYTES # BLD: 0.01 10^3/UL (ref 0–0.03)
IMM GRANULOCYTES NFR BLD: 0.2 % (ref 0–0.4)
IRON BINDING, UNBOUND (OFFPRE1): 197 UG/DL (ref 155–355)
IRON SATN MFR SERPL: 9 % (ref 15–55)
IRON SERPL-MCNC: 20 UG/DL
LENGTH OF FAST TIME PATIENT: ABNORMAL H
LYMPHOCYTES # BLD: 0.33 10^3/UL (ref 1.32–3.57)
LYMPHOCYTES NFR BLD: 7.8 % (ref 21.8–53.1)
MAGNESIUM SERPL-MCNC: 2.13 MG/DL (ref 1.9–2.7)
MCH RBC QN AUTO: 28.1 PG (ref 25.7–32.2)
MCHC RBC AUTO-ENTMCNC: 32.6 G/DL (ref 32.3–36.5)
MCV RBC AUTO: 86.3 FL (ref 79–92.2)
MONOCYTES # BLD: 0.59 10^3/UL (ref 0.3–0.82)
MONOCYTES NFR BLD: 14 % (ref 5.3–12.2)
NEUTROPHILS # BLD: 3.09 10^3/UL (ref 1.78–5.38)
NEUTROPHILS NFR BLD: 73.5 % (ref 34–71.1)
PHOSPHATE SERPL-MCNC: 4.2 MG/DL (ref 3.7–7.2)
PLATELET # BLD: 178 10^3/UL (ref 163–337)
PMV BLD AUTO: 10.2 FL (ref 9.4–12.4)
POTASSIUM SERPL-SCNC: 4.6 MMOL/L (ref 3.5–5)
RBC # BLD: 3.95 10^6/UL (ref 4.63–6.08)
SODIUM SERPL-SCNC: 129 MMOL/L (ref 135–145)
TIBC SERPL-MCNC: 217 UG/DL (ref 250–450)
WBC # BLD: 4.21 10^3/UL (ref 4.23–9.07)

## 2025-03-25 ENCOUNTER — ANCILLARY PROCEDURE (OUTPATIENT)
Dept: CARDIOLOGY | Age: 75
End: 2025-03-25
Attending: STUDENT IN AN ORGANIZED HEALTH CARE EDUCATION/TRAINING PROGRAM

## 2025-03-25 DIAGNOSIS — Z95.0 PRESENCE OF CARDIAC PACEMAKER: ICD-10-CM

## 2025-04-01 ENCOUNTER — APPOINTMENT (OUTPATIENT)
Dept: CARDIOLOGY | Age: 75
End: 2025-04-01

## 2025-04-01 VITALS
DIASTOLIC BLOOD PRESSURE: 62 MMHG | OXYGEN SATURATION: 97 % | SYSTOLIC BLOOD PRESSURE: 98 MMHG | BODY MASS INDEX: 37.93 KG/M2 | WEIGHT: 280 LBS | HEIGHT: 72 IN | TEMPERATURE: 96.3 F | HEART RATE: 71 BPM

## 2025-04-01 DIAGNOSIS — E78.5 DYSLIPIDEMIA, GOAL LDL BELOW 70: ICD-10-CM

## 2025-04-01 DIAGNOSIS — I48.20 CHRONIC ATRIAL FIBRILLATION  (CMD): Primary | ICD-10-CM

## 2025-04-01 DIAGNOSIS — Z86.718 HISTORY OF DVT (DEEP VEIN THROMBOSIS): ICD-10-CM

## 2025-04-01 DIAGNOSIS — Z95.0 PRESENCE OF CARDIAC PACEMAKER: ICD-10-CM

## 2025-04-01 DIAGNOSIS — I15.1 HYPERTENSION SECONDARY TO OTHER RENAL DISORDERS: ICD-10-CM

## 2025-04-01 RX ORDER — ACETAMINOPHEN 500 MG
TABLET ORAL
COMMUNITY

## 2025-04-01 RX ORDER — SENNOSIDES A AND B 8.6 MG/1
1 TABLET, FILM COATED ORAL DAILY
COMMUNITY

## 2025-04-01 RX ORDER — FAMOTIDINE 20 MG/1
20 TABLET, FILM COATED ORAL 2 TIMES DAILY
COMMUNITY

## 2025-04-01 RX ORDER — GLIPIZIDE 2.5 MG/1
2.5 TABLET, EXTENDED RELEASE ORAL DAILY
COMMUNITY

## 2025-04-02 PROBLEM — Z95.0 PRESENCE OF CARDIAC PACEMAKER: Status: ACTIVE | Noted: 2025-04-02

## 2025-04-02 PROBLEM — I15.1 HYPERTENSION SECONDARY TO OTHER RENAL DISORDERS: Status: ACTIVE | Noted: 2025-04-02

## 2025-04-02 PROBLEM — E78.5 DYSLIPIDEMIA, GOAL LDL BELOW 70: Status: ACTIVE | Noted: 2025-04-02

## 2025-04-02 PROBLEM — I48.20 CHRONIC ATRIAL FIBRILLATION  (CMD): Status: ACTIVE | Noted: 2025-04-02

## 2025-04-02 PROBLEM — Z86.718 HISTORY OF DVT (DEEP VEIN THROMBOSIS): Status: ACTIVE | Noted: 2025-04-02

## 2025-04-02 ASSESSMENT — ENCOUNTER SYMPTOMS
EYE DISCHARGE: 0
DIARRHEA: 0
WEAKNESS: 0
ABDOMINAL PAIN: 0
CHEST TIGHTNESS: 0
APNEA: 0
SINUS PAIN: 0
CONFUSION: 0
HALLUCINATIONS: 0
UNEXPECTED WEIGHT CHANGE: 0
EYE PAIN: 0
FEVER: 0
COLOR CHANGE: 0
CONSTIPATION: 0
FACIAL SWELLING: 0
LIGHT-HEADEDNESS: 0
APPETITE CHANGE: 0
ABDOMINAL DISTENTION: 0
SHORTNESS OF BREATH: 0
SLEEP DISTURBANCE: 0
BLOOD IN STOOL: 0
NUMBNESS: 0
ACTIVITY CHANGE: 0
FACIAL ASYMMETRY: 0
WHEEZING: 0
HEADACHES: 0
SPEECH DIFFICULTY: 0
BACK PAIN: 0
VOMITING: 0

## 2025-05-02 ENCOUNTER — ANCILLARY PROCEDURE (OUTPATIENT)
Dept: CARDIOLOGY | Age: 75
End: 2025-05-02
Attending: INTERNAL MEDICINE

## 2025-05-02 ENCOUNTER — APPOINTMENT (OUTPATIENT)
Dept: CARDIOLOGY | Age: 75
End: 2025-05-02
Attending: INTERNAL MEDICINE

## 2025-05-02 ENCOUNTER — LAB SERVICES (OUTPATIENT)
Dept: CARDIOLOGY | Age: 75
End: 2025-05-02

## 2025-05-02 VITALS
DIASTOLIC BLOOD PRESSURE: 55 MMHG | SYSTOLIC BLOOD PRESSURE: 110 MMHG | WEIGHT: 285 LBS | BODY MASS INDEX: 38.6 KG/M2 | HEIGHT: 72 IN

## 2025-05-02 VITALS — WEIGHT: 279.98 LBS | BODY MASS INDEX: 37.92 KG/M2 | HEIGHT: 72 IN

## 2025-05-02 DIAGNOSIS — I48.20 CHRONIC ATRIAL FIBRILLATION  (CMD): ICD-10-CM

## 2025-05-02 LAB
ASCENDING AORTA (AAD): 5
AV MEAN PRESS GRAD SYS DOP V1V2: 1.76 MM[HG]
AV MEAN VELOCITY (AVMV): 0.63
AV ORIFICE AREA US: 2.96 CM2
AV PEAK GRADIENT (AVPG): 2.93
AV STENOSIS SEVERITY TEXT: NORMAL
AV VMAX SYS DOP: 0.86
AVI LVOT PEAK GRADIENT (LVOTMG): 1.2
HEART RATE RESERVE PREDICTED: 53.85 BPM
LEFT INTERNAL DIMENSION IN SYSTOLE (LVSD): 1.4
LEFT VENTRICULAR INTERNAL DIMENSION IN DIASTOLE (LVDD): 3.6
LEFT VENTRICULAR POSTERIOR WALL IN END DIASTOLE (LVPW): 4.8
LV EF 2D ECHO EST: 72 %
LV EF 2D ECHO EST: NORMAL %
LVOT 2D (LVOTD): 16.13
LVOT VTI (LVOTVTI): 0.76
RESTING HR ACHIEVED: 60 BPM
RV END SYSTOLIC LONGITUDINAL STRAIN FREE WALL (RVGS): 2.06
STRESS BASELINE BP: NORMAL MMHG
STRESS PEAK HR: 66 BPM
STRESS PERCENT HR: 46 %
STRESS POST PEAK BP: NORMAL MMHG
STRESS TARGET HR: 143 BPM
TRICUSPID VALVE ANNULAR PEAK VELOCITY (TVAPV): 20
TRICUSPID VALVE PEAK REGURGITATION VELOCITY (TRPV): 3.93

## 2025-05-02 PROCEDURE — 93306 TTE W/DOPPLER COMPLETE: CPT | Performed by: INTERNAL MEDICINE

## 2025-05-02 PROCEDURE — A9502 TC99M TETROFOSMIN: HCPCS | Performed by: INTERNAL MEDICINE

## 2025-05-02 PROCEDURE — 78452 HT MUSCLE IMAGE SPECT MULT: CPT | Performed by: INTERNAL MEDICINE

## 2025-05-02 PROCEDURE — 93015 CV STRESS TEST SUPVJ I&R: CPT | Performed by: INTERNAL MEDICINE

## 2025-05-02 RX ORDER — REGADENOSON 0.08 MG/ML
0.4 INJECTION, SOLUTION INTRAVENOUS ONCE
Status: COMPLETED | OUTPATIENT
Start: 2025-05-02 | End: 2025-05-02

## 2025-05-02 RX ADMIN — REGADENOSON 0.4 MG: 0.08 INJECTION, SOLUTION INTRAVENOUS at 09:55

## 2025-05-03 LAB
CHOLEST SERPL-MCNC: 134 MG/DL
CHOLEST/HDLC SERPL: 2.6 {RATIO}
HDLC SERPL-MCNC: 51 MG/DL
LDLC SERPL CALC-MCNC: 56 MG/DL
NONHDLC SERPL-MCNC: 83 MG/DL
TRIGL SERPL-MCNC: 134 MG/DL

## 2025-05-06 ENCOUNTER — RESULTS FOLLOW-UP (OUTPATIENT)
Dept: CARDIOLOGY | Age: 75
End: 2025-05-06

## 2025-05-08 LAB
HEART RATE RESERVE PREDICTED: 53.85 BPM
LV EF 2D ECHO EST: 72 %
RESTING HR ACHIEVED: 60 BPM
STRESS BASELINE BP: NORMAL MMHG
STRESS PEAK HR: 66 BPM
STRESS PERCENT HR: 46 %
STRESS POST PEAK BP: NORMAL MMHG
STRESS TARGET HR: 143 BPM

## 2025-05-13 LAB
ATRIAL RATE (BPM): 326
QRS-INTERVAL (MSEC): 152
QT-INTERVAL (MSEC): 506
QTC: 506
R AXIS (DEGREES): -86
REPORT TEXT: NORMAL
T AXIS (DEGREES): 96
VENTRICULAR RATE EKG/MIN (BPM): 60

## 2025-05-30 ENCOUNTER — TELEPHONE (OUTPATIENT)
Dept: ENDOCRINOLOGY | Age: 75
End: 2025-05-30

## 2025-07-07 ENCOUNTER — ANCILLARY PROCEDURE (OUTPATIENT)
Dept: CARDIOLOGY | Age: 75
End: 2025-07-07
Attending: STUDENT IN AN ORGANIZED HEALTH CARE EDUCATION/TRAINING PROGRAM

## 2025-07-23 ENCOUNTER — TELEPHONE (OUTPATIENT)
Age: 75
End: 2025-07-23

## 2025-08-05 ENCOUNTER — APPOINTMENT (OUTPATIENT)
Dept: CARDIOLOGY | Age: 75
End: 2025-08-05

## 2025-08-23 ASSESSMENT — ENCOUNTER SYMPTOMS
WEAKNESS: 0
BLOOD IN STOOL: 0
LIGHT-HEADEDNESS: 0
BACK PAIN: 0
APPETITE CHANGE: 0
VOMITING: 0
NUMBNESS: 0
APNEA: 0
CONFUSION: 0
ABDOMINAL DISTENTION: 0
SHORTNESS OF BREATH: 0
SPEECH DIFFICULTY: 0
SINUS PAIN: 0
FEVER: 0
HEADACHES: 0
FACIAL SWELLING: 0
DIARRHEA: 0
COLOR CHANGE: 0
CHEST TIGHTNESS: 0
ACTIVITY CHANGE: 0
UNEXPECTED WEIGHT CHANGE: 0
EYE DISCHARGE: 0
EYE PAIN: 0
HALLUCINATIONS: 0
ABDOMINAL PAIN: 0
SLEEP DISTURBANCE: 0
CONSTIPATION: 0
WHEEZING: 0
FACIAL ASYMMETRY: 0

## 2025-08-26 ENCOUNTER — APPOINTMENT (OUTPATIENT)
Dept: CARDIOLOGY | Age: 75
End: 2025-08-26

## 2025-08-26 VITALS — HEIGHT: 72 IN | BODY MASS INDEX: 37.79 KG/M2 | WEIGHT: 279 LBS

## 2025-08-26 DIAGNOSIS — I10 PRIMARY HYPERTENSION: ICD-10-CM

## 2025-08-26 DIAGNOSIS — Z86.718 HISTORY OF DVT (DEEP VEIN THROMBOSIS): Primary | ICD-10-CM

## 2025-08-26 DIAGNOSIS — Z86.39 HISTORY OF DIABETES MELLITUS: ICD-10-CM

## 2025-08-26 DIAGNOSIS — Z95.0 PRESENCE OF CARDIAC PACEMAKER: ICD-10-CM

## 2025-08-26 DIAGNOSIS — N18.31 STAGE 3A CHRONIC KIDNEY DISEASE  (CMD): ICD-10-CM

## 2025-08-26 DIAGNOSIS — E78.5 DYSLIPIDEMIA, GOAL LDL BELOW 70: ICD-10-CM

## 2025-08-26 DIAGNOSIS — I48.20 CHRONIC ATRIAL FIBRILLATION  (CMD): ICD-10-CM

## 2025-08-26 PROCEDURE — 99214 OFFICE O/P EST MOD 30 MIN: CPT | Performed by: INTERNAL MEDICINE

## 2025-08-26 PROCEDURE — G2211 COMPLEX E/M VISIT ADD ON: HCPCS | Performed by: INTERNAL MEDICINE

## 2025-09-04 ENCOUNTER — APPOINTMENT (OUTPATIENT)
Dept: CARDIOLOGY | Age: 75
End: 2025-09-04

## 2025-09-04 LAB
ATRIAL RATE (BPM): 54
QRS-INTERVAL (MSEC): 156
QT-INTERVAL (MSEC): 500
QTC: 500
R AXIS (DEGREES): -87
REPORT TEXT: NORMAL
T AXIS (DEGREES): 94
VENTRICULAR RATE EKG/MIN (BPM): 60

## 2025-11-14 ENCOUNTER — APPOINTMENT (OUTPATIENT)
Dept: ENDOCRINOLOGY | Age: 75
End: 2025-11-14

## 2026-04-07 ENCOUNTER — APPOINTMENT (OUTPATIENT)
Dept: CARDIOLOGY | Age: 76
End: 2026-04-07

## 2026-09-03 ENCOUNTER — APPOINTMENT (OUTPATIENT)
Dept: CARDIOLOGY | Age: 76
End: 2026-09-03
Attending: STUDENT IN AN ORGANIZED HEALTH CARE EDUCATION/TRAINING PROGRAM